# Patient Record
Sex: MALE | Race: WHITE | Employment: OTHER | URBAN - METROPOLITAN AREA
[De-identification: names, ages, dates, MRNs, and addresses within clinical notes are randomized per-mention and may not be internally consistent; named-entity substitution may affect disease eponyms.]

---

## 2018-10-16 ENCOUNTER — HOSPITAL ENCOUNTER (INPATIENT)
Age: 63
LOS: 4 days | Discharge: HOME | DRG: 378 | End: 2018-10-20
Attending: EMERGENCY MEDICINE | Admitting: INTERNAL MEDICINE
Payer: MEDICARE

## 2018-10-16 DIAGNOSIS — K92.1 GASTROINTESTINAL HEMORRHAGE WITH MELENA: Primary | ICD-10-CM

## 2018-10-16 DIAGNOSIS — D64.9 ANEMIA REQUIRING TRANSFUSIONS: ICD-10-CM

## 2018-10-16 PROBLEM — K92.2 ACUTE GI BLEEDING: Status: ACTIVE | Noted: 2018-10-16

## 2018-10-16 LAB
ABO/RH: NORMAL
ALBUMIN SERPL-MCNC: 3.6 G/DL (ref 3.5–5.2)
ALP BLD-CCNC: 70 U/L (ref 40–129)
ALT SERPL-CCNC: 12 U/L (ref 0–40)
ANION GAP SERPL CALCULATED.3IONS-SCNC: 13 MMOL/L (ref 7–16)
ANTIBODY SCREEN: NORMAL
AST SERPL-CCNC: 14 U/L (ref 0–39)
BASOPHILS ABSOLUTE: 0.06 E9/L (ref 0–0.2)
BASOPHILS RELATIVE PERCENT: 0.5 % (ref 0–2)
BILIRUB SERPL-MCNC: 0.3 MG/DL (ref 0–1.2)
BUN BLDV-MCNC: 19 MG/DL (ref 8–23)
CALCIUM SERPL-MCNC: 8.4 MG/DL (ref 8.6–10.2)
CHLORIDE BLD-SCNC: 103 MMOL/L (ref 98–107)
CO2: 23 MMOL/L (ref 22–29)
CREAT SERPL-MCNC: 0.8 MG/DL (ref 0.7–1.2)
EKG ATRIAL RATE: 91 BPM
EKG P AXIS: 32 DEGREES
EKG P-R INTERVAL: 158 MS
EKG Q-T INTERVAL: 350 MS
EKG QRS DURATION: 86 MS
EKG QTC CALCULATION (BAZETT): 430 MS
EKG R AXIS: -13 DEGREES
EKG T AXIS: 19 DEGREES
EKG VENTRICULAR RATE: 91 BPM
EOSINOPHILS ABSOLUTE: 0.41 E9/L (ref 0.05–0.5)
EOSINOPHILS RELATIVE PERCENT: 3.6 % (ref 0–6)
GFR AFRICAN AMERICAN: >60
GFR NON-AFRICAN AMERICAN: >60 ML/MIN/1.73
GLUCOSE BLD-MCNC: 169 MG/DL (ref 74–109)
HCT VFR BLD CALC: 22.6 % (ref 37–54)
HEMOGLOBIN: 7.2 G/DL (ref 12.5–16.5)
IMMATURE GRANULOCYTES #: 0.13 E9/L
IMMATURE GRANULOCYTES %: 1.2 % (ref 0–5)
INR BLD: 1.1
LACTIC ACID: 3.3 MMOL/L (ref 0.5–2.2)
LIPASE: 20 U/L (ref 13–60)
LYMPHOCYTES ABSOLUTE: 2.17 E9/L (ref 1.5–4)
LYMPHOCYTES RELATIVE PERCENT: 19.2 % (ref 20–42)
MCH RBC QN AUTO: 29.8 PG (ref 26–35)
MCHC RBC AUTO-ENTMCNC: 31.9 % (ref 32–34.5)
MCV RBC AUTO: 93.4 FL (ref 80–99.9)
METER GLUCOSE: 133 MG/DL (ref 70–110)
METER GLUCOSE: 192 MG/DL (ref 70–110)
MONOCYTES ABSOLUTE: 0.61 E9/L (ref 0.1–0.95)
MONOCYTES RELATIVE PERCENT: 5.4 % (ref 2–12)
NEUTROPHILS ABSOLUTE: 7.92 E9/L (ref 1.8–7.3)
NEUTROPHILS RELATIVE PERCENT: 70.1 % (ref 43–80)
PDW BLD-RTO: 14.5 FL (ref 11.5–15)
PLATELET # BLD: 164 E9/L (ref 130–450)
PMV BLD AUTO: 10.7 FL (ref 7–12)
POTASSIUM SERPL-SCNC: 4 MMOL/L (ref 3.5–5)
PROTHROMBIN TIME: 12.4 SEC (ref 9.3–12.4)
RBC # BLD: 2.42 E12/L (ref 3.8–5.8)
SODIUM BLD-SCNC: 139 MMOL/L (ref 132–146)
TOTAL PROTEIN: 5.8 G/DL (ref 6.4–8.3)
WBC # BLD: 11.3 E9/L (ref 4.5–11.5)

## 2018-10-16 PROCEDURE — 86923 COMPATIBILITY TEST ELECTRIC: CPT

## 2018-10-16 PROCEDURE — C9113 INJ PANTOPRAZOLE SODIUM, VIA: HCPCS | Performed by: EMERGENCY MEDICINE

## 2018-10-16 PROCEDURE — 6360000002 HC RX W HCPCS: Performed by: INTERNAL MEDICINE

## 2018-10-16 PROCEDURE — 93005 ELECTROCARDIOGRAM TRACING: CPT | Performed by: EMERGENCY MEDICINE

## 2018-10-16 PROCEDURE — 2580000003 HC RX 258: Performed by: EMERGENCY MEDICINE

## 2018-10-16 PROCEDURE — 86900 BLOOD TYPING SEROLOGIC ABO: CPT

## 2018-10-16 PROCEDURE — 2580000003 HC RX 258: Performed by: INTERNAL MEDICINE

## 2018-10-16 PROCEDURE — 2060000000 HC ICU INTERMEDIATE R&B

## 2018-10-16 PROCEDURE — 96374 THER/PROPH/DIAG INJ IV PUSH: CPT

## 2018-10-16 PROCEDURE — P9016 RBC LEUKOCYTES REDUCED: HCPCS

## 2018-10-16 PROCEDURE — 86901 BLOOD TYPING SEROLOGIC RH(D): CPT

## 2018-10-16 PROCEDURE — 6370000000 HC RX 637 (ALT 250 FOR IP): Performed by: INTERNAL MEDICINE

## 2018-10-16 PROCEDURE — 6360000002 HC RX W HCPCS: Performed by: EMERGENCY MEDICINE

## 2018-10-16 PROCEDURE — 85610 PROTHROMBIN TIME: CPT

## 2018-10-16 PROCEDURE — 86850 RBC ANTIBODY SCREEN: CPT

## 2018-10-16 PROCEDURE — 83690 ASSAY OF LIPASE: CPT

## 2018-10-16 PROCEDURE — 83605 ASSAY OF LACTIC ACID: CPT

## 2018-10-16 PROCEDURE — 80053 COMPREHEN METABOLIC PANEL: CPT

## 2018-10-16 PROCEDURE — 99285 EMERGENCY DEPT VISIT HI MDM: CPT

## 2018-10-16 PROCEDURE — 85025 COMPLETE CBC W/AUTO DIFF WBC: CPT

## 2018-10-16 PROCEDURE — 82962 GLUCOSE BLOOD TEST: CPT

## 2018-10-16 RX ORDER — ONDANSETRON 2 MG/ML
4 INJECTION INTRAMUSCULAR; INTRAVENOUS EVERY 6 HOURS PRN
Status: DISCONTINUED | OUTPATIENT
Start: 2018-10-16 | End: 2018-10-20 | Stop reason: HOSPADM

## 2018-10-16 RX ORDER — DEXTROSE MONOHYDRATE 25 G/50ML
12.5 INJECTION, SOLUTION INTRAVENOUS PRN
Status: DISCONTINUED | OUTPATIENT
Start: 2018-10-16 | End: 2018-10-20 | Stop reason: HOSPADM

## 2018-10-16 RX ORDER — CHOLECALCIFEROL (VITAMIN D3) 125 MCG
500 CAPSULE ORAL NIGHTLY
COMMUNITY
End: 2021-11-03

## 2018-10-16 RX ORDER — 0.9 % SODIUM CHLORIDE 0.9 %
250 INTRAVENOUS SOLUTION INTRAVENOUS ONCE
Status: COMPLETED | OUTPATIENT
Start: 2018-10-16 | End: 2018-10-18

## 2018-10-16 RX ORDER — CHLORAL HYDRATE 500 MG
1000 CAPSULE ORAL EVERY MORNING
COMMUNITY

## 2018-10-16 RX ORDER — SODIUM CHLORIDE 0.9 % (FLUSH) 0.9 %
10 SYRINGE (ML) INJECTION EVERY 12 HOURS SCHEDULED
Status: DISCONTINUED | OUTPATIENT
Start: 2018-10-16 | End: 2018-10-16 | Stop reason: SDUPTHER

## 2018-10-16 RX ORDER — DEXTROSE MONOHYDRATE 50 MG/ML
100 INJECTION, SOLUTION INTRAVENOUS PRN
Status: DISCONTINUED | OUTPATIENT
Start: 2018-10-16 | End: 2018-10-20 | Stop reason: HOSPADM

## 2018-10-16 RX ORDER — FAMOTIDINE 20 MG/1
20 TABLET, FILM COATED ORAL 2 TIMES DAILY
Status: DISCONTINUED | OUTPATIENT
Start: 2018-10-16 | End: 2018-10-17

## 2018-10-16 RX ORDER — NAPROXEN SODIUM 220 MG
220 TABLET ORAL EVERY MORNING
Status: ON HOLD | COMMUNITY
End: 2018-10-20 | Stop reason: HOSPADM

## 2018-10-16 RX ORDER — SODIUM CHLORIDE 0.9 % (FLUSH) 0.9 %
10 SYRINGE (ML) INJECTION PRN
Status: DISCONTINUED | OUTPATIENT
Start: 2018-10-16 | End: 2018-10-20 | Stop reason: HOSPADM

## 2018-10-16 RX ORDER — VITAMIN E 268 MG
400 CAPSULE ORAL EVERY MORNING
COMMUNITY
End: 2021-11-03

## 2018-10-16 RX ORDER — ACETAMINOPHEN 325 MG/1
650 TABLET ORAL EVERY 4 HOURS PRN
Status: DISCONTINUED | OUTPATIENT
Start: 2018-10-16 | End: 2018-10-20 | Stop reason: HOSPADM

## 2018-10-16 RX ORDER — SODIUM CHLORIDE 0.9 % (FLUSH) 0.9 %
10 SYRINGE (ML) INJECTION EVERY 12 HOURS SCHEDULED
Status: DISCONTINUED | OUTPATIENT
Start: 2018-10-16 | End: 2018-10-20 | Stop reason: HOSPADM

## 2018-10-16 RX ORDER — 0.9 % SODIUM CHLORIDE 0.9 %
1000 INTRAVENOUS SOLUTION INTRAVENOUS ONCE
Status: COMPLETED | OUTPATIENT
Start: 2018-10-16 | End: 2018-10-16

## 2018-10-16 RX ORDER — SODIUM CHLORIDE 0.9 % (FLUSH) 0.9 %
10 SYRINGE (ML) INJECTION PRN
Status: DISCONTINUED | OUTPATIENT
Start: 2018-10-16 | End: 2018-10-16 | Stop reason: SDUPTHER

## 2018-10-16 RX ORDER — PANTOPRAZOLE SODIUM 40 MG/10ML
40 INJECTION, POWDER, LYOPHILIZED, FOR SOLUTION INTRAVENOUS ONCE
Status: COMPLETED | OUTPATIENT
Start: 2018-10-16 | End: 2018-10-16

## 2018-10-16 RX ORDER — SODIUM CHLORIDE 9 MG/ML
INJECTION, SOLUTION INTRAVENOUS CONTINUOUS
Status: DISCONTINUED | OUTPATIENT
Start: 2018-10-16 | End: 2018-10-20

## 2018-10-16 RX ORDER — NICOTINE POLACRILEX 4 MG
15 LOZENGE BUCCAL PRN
Status: DISCONTINUED | OUTPATIENT
Start: 2018-10-16 | End: 2018-10-20 | Stop reason: HOSPADM

## 2018-10-16 RX ADMIN — ENOXAPARIN SODIUM 40 MG: 40 INJECTION, SOLUTION INTRAVENOUS; SUBCUTANEOUS at 18:23

## 2018-10-16 RX ADMIN — SODIUM CHLORIDE: 9 INJECTION, SOLUTION INTRAVENOUS at 18:23

## 2018-10-16 RX ADMIN — INSULIN LISPRO 1 UNITS: 100 INJECTION, SOLUTION INTRAVENOUS; SUBCUTANEOUS at 20:02

## 2018-10-16 RX ADMIN — PANTOPRAZOLE SODIUM 40 MG: 40 INJECTION, POWDER, FOR SOLUTION INTRAVENOUS at 14:34

## 2018-10-16 RX ADMIN — FAMOTIDINE 20 MG: 20 TABLET, FILM COATED ORAL at 20:01

## 2018-10-16 RX ADMIN — SODIUM CHLORIDE 1000 ML: 9 INJECTION, SOLUTION INTRAVENOUS at 14:32

## 2018-10-16 ASSESSMENT — ENCOUNTER SYMPTOMS
COUGH: 0
VOMITING: 0
DIARRHEA: 0
SINUS PRESSURE: 0
EYE PAIN: 0
BLOOD IN STOOL: 1
SHORTNESS OF BREATH: 0
BACK PAIN: 0
SORE THROAT: 0
HEMATEMESIS: 0
ABDOMINAL PAIN: 0
NAUSEA: 0
EYE DISCHARGE: 0
HEMATOCHEZIA: 1
WHEEZING: 0
EYE REDNESS: 0

## 2018-10-16 ASSESSMENT — PAIN SCALES - GENERAL
PAINLEVEL_OUTOF10: 0

## 2018-10-16 NOTE — ED PROVIDER NOTES
---------------------------------  Consultations:  Time: 7304. Spoke with Dr. Amelia Burgos. Discussed case. They will admit the patient. This patient's ED course included: a personal history and physicial examination, re-evaluation prior to disposition, multiple bedside re-evaluations, IV medications, cardiac monitoring and complex medical decision making and emergency management    This patient has remained hemodynamically stable during their ED course. Diagnosis:  1. Gastrointestinal hemorrhage with melena        Disposition:  Patient's disposition: Admit to telemetry  Patient's condition is stable.            Melissa Taylor DO  Resident  10/16/18 4052

## 2018-10-16 NOTE — ED NOTES
[facility] faxed and received per Lucina Yates will be nurse and Priscilla Chilo to advise her of need for unit of blood once ready     Tory Matt RN  10/16/18 7303

## 2018-10-17 ENCOUNTER — ANESTHESIA (OUTPATIENT)
Dept: ENDOSCOPY | Age: 63
DRG: 378 | End: 2018-10-17
Payer: MEDICARE

## 2018-10-17 ENCOUNTER — APPOINTMENT (OUTPATIENT)
Dept: CT IMAGING | Age: 63
DRG: 378 | End: 2018-10-17
Payer: MEDICARE

## 2018-10-17 ENCOUNTER — ANESTHESIA EVENT (OUTPATIENT)
Dept: ENDOSCOPY | Age: 63
DRG: 378 | End: 2018-10-17
Payer: MEDICARE

## 2018-10-17 VITALS
SYSTOLIC BLOOD PRESSURE: 131 MMHG | RESPIRATION RATE: 58 BRPM | DIASTOLIC BLOOD PRESSURE: 58 MMHG | OXYGEN SATURATION: 93 %

## 2018-10-17 LAB
ALBUMIN SERPL-MCNC: 2.9 G/DL (ref 3.5–5.2)
ALP BLD-CCNC: 63 U/L (ref 40–129)
ALT SERPL-CCNC: 9 U/L (ref 0–40)
ANION GAP SERPL CALCULATED.3IONS-SCNC: 10 MMOL/L (ref 7–16)
AST SERPL-CCNC: 11 U/L (ref 0–39)
BASOPHILS ABSOLUTE: 0.04 E9/L (ref 0–0.2)
BASOPHILS RELATIVE PERCENT: 0.3 % (ref 0–2)
BILIRUB SERPL-MCNC: 0.3 MG/DL (ref 0–1.2)
BLOOD BANK DISPENSE STATUS: NORMAL
BLOOD BANK PRODUCT CODE: NORMAL
BPU ID: NORMAL
BUN BLDV-MCNC: 17 MG/DL (ref 8–23)
CALCIUM SERPL-MCNC: 8 MG/DL (ref 8.6–10.2)
CHLORIDE BLD-SCNC: 103 MMOL/L (ref 98–107)
CO2: 26 MMOL/L (ref 22–29)
CREAT SERPL-MCNC: 0.7 MG/DL (ref 0.7–1.2)
DESCRIPTION BLOOD BANK: NORMAL
EOSINOPHILS ABSOLUTE: 0.55 E9/L (ref 0.05–0.5)
EOSINOPHILS RELATIVE PERCENT: 4.6 % (ref 0–6)
GFR AFRICAN AMERICAN: >60
GFR NON-AFRICAN AMERICAN: >60 ML/MIN/1.73
GLUCOSE BLD-MCNC: 160 MG/DL (ref 74–109)
HCT VFR BLD CALC: 18.9 % (ref 37–54)
HCT VFR BLD CALC: 19 % (ref 37–54)
HCT VFR BLD CALC: 21.4 % (ref 37–54)
HEMOGLOBIN: 6.2 G/DL (ref 12.5–16.5)
HEMOGLOBIN: 6.2 G/DL (ref 12.5–16.5)
HEMOGLOBIN: 7 G/DL (ref 12.5–16.5)
IMMATURE GRANULOCYTES #: 0.15 E9/L
IMMATURE GRANULOCYTES %: 1.3 % (ref 0–5)
LYMPHOCYTES ABSOLUTE: 2.87 E9/L (ref 1.5–4)
LYMPHOCYTES RELATIVE PERCENT: 24.2 % (ref 20–42)
MCH RBC QN AUTO: 30.4 PG (ref 26–35)
MCHC RBC AUTO-ENTMCNC: 32.6 % (ref 32–34.5)
MCV RBC AUTO: 93.1 FL (ref 80–99.9)
METER GLUCOSE: 146 MG/DL (ref 70–110)
METER GLUCOSE: 156 MG/DL (ref 70–110)
METER GLUCOSE: 171 MG/DL (ref 70–110)
METER GLUCOSE: 177 MG/DL (ref 70–110)
MONOCYTES ABSOLUTE: 0.66 E9/L (ref 0.1–0.95)
MONOCYTES RELATIVE PERCENT: 5.6 % (ref 2–12)
NEUTROPHILS ABSOLUTE: 7.61 E9/L (ref 1.8–7.3)
NEUTROPHILS RELATIVE PERCENT: 64 % (ref 43–80)
PDW BLD-RTO: 14.9 FL (ref 11.5–15)
PLATELET # BLD: 150 E9/L (ref 130–450)
PMV BLD AUTO: 10.8 FL (ref 7–12)
POTASSIUM REFLEX MAGNESIUM: 3.8 MMOL/L (ref 3.5–5)
RBC # BLD: 2.04 E12/L (ref 3.8–5.8)
SODIUM BLD-SCNC: 139 MMOL/L (ref 132–146)
TOTAL PROTEIN: 4.9 G/DL (ref 6.4–8.3)
WBC # BLD: 11.9 E9/L (ref 4.5–11.5)

## 2018-10-17 PROCEDURE — 6360000002 HC RX W HCPCS: Performed by: NURSE ANESTHETIST, CERTIFIED REGISTERED

## 2018-10-17 PROCEDURE — 6360000004 HC RX CONTRAST MEDICATION: Performed by: RADIOLOGY

## 2018-10-17 PROCEDURE — P9016 RBC LEUKOCYTES REDUCED: HCPCS

## 2018-10-17 PROCEDURE — 6370000000 HC RX 637 (ALT 250 FOR IP): Performed by: INTERNAL MEDICINE

## 2018-10-17 PROCEDURE — 85014 HEMATOCRIT: CPT

## 2018-10-17 PROCEDURE — 3700000001 HC ADD 15 MINUTES (ANESTHESIA): Performed by: INTERNAL MEDICINE

## 2018-10-17 PROCEDURE — 36415 COLL VENOUS BLD VENIPUNCTURE: CPT

## 2018-10-17 PROCEDURE — 7100000010 HC PHASE II RECOVERY - FIRST 15 MIN: Performed by: INTERNAL MEDICINE

## 2018-10-17 PROCEDURE — 2709999900 HC NON-CHARGEABLE SUPPLY: Performed by: INTERNAL MEDICINE

## 2018-10-17 PROCEDURE — C9113 INJ PANTOPRAZOLE SODIUM, VIA: HCPCS | Performed by: CLINICAL NURSE SPECIALIST

## 2018-10-17 PROCEDURE — 3700000000 HC ANESTHESIA ATTENDED CARE: Performed by: INTERNAL MEDICINE

## 2018-10-17 PROCEDURE — 85025 COMPLETE CBC W/AUTO DIFF WBC: CPT

## 2018-10-17 PROCEDURE — 87081 CULTURE SCREEN ONLY: CPT

## 2018-10-17 PROCEDURE — 3609012400 HC EGD TRANSORAL BIOPSY SINGLE/MULTIPLE: Performed by: INTERNAL MEDICINE

## 2018-10-17 PROCEDURE — 0DB68ZX EXCISION OF STOMACH, VIA NATURAL OR ARTIFICIAL OPENING ENDOSCOPIC, DIAGNOSTIC: ICD-10-PCS | Performed by: INTERNAL MEDICINE

## 2018-10-17 PROCEDURE — 2580000003 HC RX 258: Performed by: INTERNAL MEDICINE

## 2018-10-17 PROCEDURE — 2060000000 HC ICU INTERMEDIATE R&B

## 2018-10-17 PROCEDURE — 85018 HEMOGLOBIN: CPT

## 2018-10-17 PROCEDURE — 82962 GLUCOSE BLOOD TEST: CPT

## 2018-10-17 PROCEDURE — 86923 COMPATIBILITY TEST ELECTRIC: CPT

## 2018-10-17 PROCEDURE — 2580000003 HC RX 258: Performed by: NURSE ANESTHETIST, CERTIFIED REGISTERED

## 2018-10-17 PROCEDURE — 2580000003 HC RX 258: Performed by: EMERGENCY MEDICINE

## 2018-10-17 PROCEDURE — 6360000002 HC RX W HCPCS: Performed by: CLINICAL NURSE SPECIALIST

## 2018-10-17 PROCEDURE — 7100000011 HC PHASE II RECOVERY - ADDTL 15 MIN: Performed by: INTERNAL MEDICINE

## 2018-10-17 PROCEDURE — 36430 TRANSFUSION BLD/BLD COMPNT: CPT

## 2018-10-17 PROCEDURE — 80053 COMPREHEN METABOLIC PANEL: CPT

## 2018-10-17 PROCEDURE — 74178 CT ABD&PLV WO CNTR FLWD CNTR: CPT

## 2018-10-17 PROCEDURE — 2580000003 HC RX 258: Performed by: CLINICAL NURSE SPECIALIST

## 2018-10-17 RX ORDER — PROPOFOL 10 MG/ML
INJECTION, EMULSION INTRAVENOUS PRN
Status: DISCONTINUED | OUTPATIENT
Start: 2018-10-17 | End: 2018-10-17 | Stop reason: SDUPTHER

## 2018-10-17 RX ORDER — SODIUM CHLORIDE 9 MG/ML
INJECTION, SOLUTION INTRAVENOUS CONTINUOUS PRN
Status: DISCONTINUED | OUTPATIENT
Start: 2018-10-17 | End: 2018-10-17 | Stop reason: SDUPTHER

## 2018-10-17 RX ORDER — 0.9 % SODIUM CHLORIDE 0.9 %
250 INTRAVENOUS SOLUTION INTRAVENOUS ONCE
Status: DISCONTINUED | OUTPATIENT
Start: 2018-10-17 | End: 2018-10-20

## 2018-10-17 RX ORDER — GENTAMICIN SULFATE 80 MG/50ML
80 INJECTION, SOLUTION INTRAVENOUS ONCE
Status: COMPLETED | OUTPATIENT
Start: 2018-10-17 | End: 2018-10-17

## 2018-10-17 RX ADMIN — SODIUM CHLORIDE 8 MG/HR: 9 INJECTION, SOLUTION INTRAVENOUS at 13:15

## 2018-10-17 RX ADMIN — PROPOFOL 200 MG: 10 INJECTION, EMULSION INTRAVENOUS at 16:30

## 2018-10-17 RX ADMIN — SODIUM CHLORIDE 250 ML: 9 INJECTION, SOLUTION INTRAVENOUS at 09:00

## 2018-10-17 RX ADMIN — IOPAMIDOL 110 ML: 755 INJECTION, SOLUTION INTRAVENOUS at 20:55

## 2018-10-17 RX ADMIN — Medication 20 ML: at 12:47

## 2018-10-17 RX ADMIN — AMPICILLIN SODIUM 2 G: 2 INJECTION, POWDER, FOR SOLUTION INTRAMUSCULAR; INTRAVENOUS at 11:57

## 2018-10-17 RX ADMIN — SODIUM CHLORIDE: 9 INJECTION, SOLUTION INTRAVENOUS at 16:03

## 2018-10-17 RX ADMIN — Medication 10 ML: at 10:34

## 2018-10-17 RX ADMIN — IOHEXOL 50 ML: 240 INJECTION, SOLUTION INTRATHECAL; INTRAVASCULAR; INTRAVENOUS; ORAL at 20:55

## 2018-10-17 RX ADMIN — INSULIN LISPRO 1 UNITS: 100 INJECTION, SOLUTION INTRAVENOUS; SUBCUTANEOUS at 22:11

## 2018-10-17 RX ADMIN — Medication 10 ML: at 22:17

## 2018-10-17 RX ADMIN — GENTAMICIN SULFATE 80 MG: 80 INJECTION, SOLUTION INTRAVENOUS at 12:33

## 2018-10-17 RX ADMIN — OCTREOTIDE ACETATE 50 MCG/HR: 500 INJECTION, SOLUTION INTRAVENOUS; SUBCUTANEOUS at 11:16

## 2018-10-17 RX ADMIN — Medication 10 ML: at 09:00

## 2018-10-17 ASSESSMENT — PAIN SCALES - GENERAL
PAINLEVEL_OUTOF10: 0

## 2018-10-17 ASSESSMENT — PAIN DESCRIPTION - LOCATION
LOCATION: ABDOMEN

## 2018-10-17 ASSESSMENT — LIFESTYLE VARIABLES: SMOKING_STATUS: 1

## 2018-10-17 NOTE — ANESTHESIA PRE PROCEDURE
10/17/18 0900 250 mL at 10/17/18 0900    acetaminophen (TYLENOL) tablet 650 mg  650 mg Oral Q4H PRN Sky Ch MD        0.9 % sodium chloride infusion   Intravenous Continuous Sky Ch MD 75 mL/hr at 10/16/18 1823      sodium chloride flush 0.9 % injection 10 mL  10 mL Intravenous 2 times per day Sky Ch MD   10 mL at 10/17/18 0900    sodium chloride flush 0.9 % injection 10 mL  10 mL Intravenous PRN Sky Ch MD   20 mL at 10/17/18 1247    magnesium hydroxide (MILK OF MAGNESIA) 400 MG/5ML suspension 30 mL  30 mL Oral Daily PRN Sky Ch MD        ondansetron [facility]) injection 4 mg  4 mg Intravenous Q6H PRN Sky Ch MD        enoxaparin (LOVENOX) injection 40 mg  40 mg Subcutaneous Daily Sky Ch MD   Stopped at 10/17/18 0919    insulin lispro (HUMALOG) injection vial 0-6 Units  0-6 Units Subcutaneous TID WC Sky Ch MD        insulin lispro (HUMALOG) injection vial 0-3 Units  0-3 Units Subcutaneous Nightly Sky Ch MD   1 Units at 10/16/18 2002    glucose (GLUTOSE) 40 % oral gel 15 g  15 g Oral PRN Sky Ch MD        dextrose 50 % solution 12.5 g  12.5 g Intravenous PRN Sky Ch MD        glucagon (rDNA) injection 1 mg  1 mg Intramuscular PRN Sky Ch MD        dextrose 5 % solution  100 mL/hr Intravenous PRN Sky Ch MD           Allergies:  No Known Allergies    Problem List:    Patient Active Problem List   Diagnosis Code    Primary osteoarthritis of left hip M16.12    Primary osteoarthritis of left hip M16.12    Instability of right hip joint M25.351    Acute GI bleeding K92.2       Past Medical History:        Diagnosis Date    Arthritis     osteo    Diabetes mellitus ([address]     Full dentures     only wears uppers    Gout     h/o    History of blood transfusion     Sleep apnea     uses CPAP       Past Surgical History:        Procedure Laterality Date    COLONOSCOPY      HERNIA REPAIR      HIP CLOSED REDUCTION Right 7/14/2013    HIP CLOSED GLUCOSE 160 10/17/2018    PROT 4.9 10/17/2018    CALCIUM 8.0 10/17/2018    BILITOT 0.3 10/17/2018    ALKPHOS 63 10/17/2018    AST 11 10/17/2018    ALT 9 10/17/2018       POC Tests: No results for input(s): POCGLU, POCNA, POCK, POCCL, POCBUN, POCHEMO, POCHCT in the last 72 hours. Coags:   Lab Results   Component Value Date    PROTIME 12.4 10/16/2018    INR 1.1 10/16/2018       HCG (If Applicable): No results found for: PREGTESTUR, PREGSERUM, HCG, HCGQUANT     ABGs: No results found for: PHART, PO2ART, HBS7HWD, JHX2IZE, BEART, Z6AATPMT     Type & Screen (If Applicable):  No results found for: [facility]    Anesthesia Evaluation  Patient summary reviewed no history of anesthetic complications:   Airway: Mallampati: II  TM distance: >3 FB   Neck ROM: full   Dental:    (+) edentulous      Pulmonary: breath sounds clear to auscultation  (+) sleep apnea: on CPAP,  current smoker                           Cardiovascular:Negative CV ROS            Rhythm: regular  Rate: normal           Beta Blocker:  Not on Beta Blocker         Neuro/Psych:   Negative Neuro/Psych ROS              GI/Hepatic/Renal:   (+) morbid obesity         ROS comment: Acute GI bleeding. Endo/Other:    (+) DiabetesType II DM, , blood dyscrasia: anemia, arthritis: OA., .                  ROS comment: gout Abdominal:           Vascular: negative vascular ROS. Anesthesia Plan      MAC     ASA 3       Induction: intravenous. Anesthetic plan and risks discussed with patient. Plan discussed with CRNA.                   Fracna Alvarez MD   10/17/2018

## 2018-10-17 NOTE — H&P
History and Physical      CHIEF COMPLAINT:  melena      HISTORY OF PRESENT ILLNESS:      The patient is a 61 y.o. male patient of Dr Claribel Goodman who presents with melena from home. He was well until last Friday when he began to develop black tarry stools. He contacted his family doctor who said to monitor the condition over the weekend report back on Monday. Throughout the weekend he continued to have melena and called his family doctor's office on Monday. He called again on Tuesday morning and was referred to the emergency room. He has a history of arthritis and has had previous hip surgery. He has been taking naproxen for some time. He does state that many years ago he had some internal bleeding apparently related to naproxen. A colonoscopy was performed at that time and was unremarkable. He was recently seen by orthopedics and placed on a different nonsteroidal. He also has been experiencing epigastric abdominal discomfort recently. He denies any fever or chills chest pain shortness of breath. His symptoms were gradual in onset moderate severity. He does admit to associated weakness and fatigue. He was anemic and Hemoccult positive in the emergency room.     Past Medical History:    Past Medical History:   Diagnosis Date    Arthritis     osteo    Diabetes mellitus ([address]     Full dentures     only wears uppers    Gout     h/o    History of blood transfusion     Sleep apnea     uses CPAP       Past Surgical History:    Past Surgical History:   Procedure Laterality Date    COLONOSCOPY      HERNIA REPAIR      HIP CLOSED REDUCTION Right 7/14/2013    HIP CLOSED REDUCTION Right 6/5/2015    JOINT REPLACEMENT Right 16 yrs ago    tjah    KNEE ARTHROSCOPY Bilateral     OTHER SURGICAL HISTORY  12/11/2014    left total hip arthroplasty    REVISION TOTAL HIP ARTHROPLASTY Right 06/29/2015       Medications Prior to Admission:    Prescriptions Prior to Admission: aspirin 81 MG tablet, Take 81 mg by mouth nightly Neutrophils % 64.0 %    Immature Granulocytes % 1.3 %    Lymphocytes % 24.2 %    Monocytes % 5.6 %    Eosinophils % 4.6 %    Basophils % 0.3 %    Neutrophils # 7.61 (H) E9/L    Immature Granulocytes # 0.15 E9/L    Lymphocytes # 2.87 E9/L    Monocytes # 0.66 E9/L    Eosinophils # 0.55 (H) E9/L    Basophils # 0.04 E9/L   Transfuse RBC [[id number]] Resulted: 10/16/18 2134   Updated: 10/16/18 2134    POCT Glucose [[id number]] (Abnormal) Collected: 10/16/18 1958   Updated: 10/16/18 2009     Meter Glucose 192 (H) mg/dL   TYPE AND SCREEN [[id number]] Collected: 10/16/18 1426   Updated: 10/16/18 1810    Specimen Source: Blood     ABO/Rh O POS    Antibody Screen NEG   POCT Glucose [[id number]] (Abnormal) Collected: 10/16/18 1802   Updated: 10/16/18 1807     Meter Glucose 133 (H) mg/dL   Comprehensive Metabolic Panel [[id number]] (Abnormal) Collected: 10/16/18 1426   Updated: 10/16/18 1459    Specimen Type: Blood    Specimen Source: Blood     Sodium 139 mmol/L    Potassium 4.0 mmol/L    Chloride 103 mmol/L    CO2 23 mmol/L    Anion Gap 13 mmol/L    Glucose 169 (H) mg/dL    BUN 19 mg/dL    CREATININE 0.8 mg/dL    GFR Non-African American >60 mL/min/1.73    Comment: Chronic Kidney Disease: less than 60 ml/min/1.73 sq. m.         Kidney Failure: less than 15 ml/min/1.73 sq.m. Results valid for patients 18 years and older.         GFR African American >60    Calcium 8.4 (L) mg/dL    Total Protein 5.8 (L) g/dL    Alb 3.6 g/dL    Total Bilirubin 0.3 mg/dL    Alkaline Phosphatase 70 U/L    ALT 12 U/L    AST 14 U/L   Lipase [[id number]] Collected: 10/16/18 1426   Updated: 10/16/18 1459    Specimen Type: Blood    Specimen Source: Blood     Lipase 20 U/L   CBC Auto Differential [[id number]] (Abnormal) Collected: 10/16/18 1426   Updated: 10/16/18 1457    Specimen Source: Blood     WBC 11.3 E9/L    RBC 2.42 (L) E12/L    Hemoglobin 7.2 (L) g/dL    Hematocrit 22.6 (L) %    MCV 93.4 fL    MCH 29.8 pg    MCHC 31.9 (L) %    RDW 14.5 fL

## 2018-10-17 NOTE — CONSULTS
Consults     Gastroenterology Consult Note   Margret Cloud [facility] with Triny Zaidi M.D. Consult Note        Date of Service: 10/17/2018  Reason for Consult: GI bleed; known to you  Requesting Physician: Dr Karlie Perea:  Weakness, fatigue, maroon and black tarry stools    History Obtained From:  patient, electronic medical record    HISTORY OF PRESENT ILLNESS:       [place] Leal is a 61 y.o. male with significant past medical history of CHAITANYA; valvular heart disease - follows with Dr Reese Rust; blood transfusion; gout; DM; and arthritis admitted via ED for maroon and black tarry stools, weakness, and fatigue. Pt reports he noted black tarry stools since this past Friday, 10/12/2018, reportedly called his PCPs office. Patient reports to black tarry stools continued throughout the weekend so he called his PCP office on Monday morning, stating he was called back yesterday and told to proceed to the emergency department for evaluation. Patient reports he has noted fatigue and weakness over the past several weeks. He is reportedly undergoing physical therapy for his right hip in [facility] and states he has had to take breaks during his physical therapy due to fatigue. He noted dyspnea on exertion all day yesterday prior to coming to the hospital. Patient states he has had epigastric pain described as a \"twinge\" off and on over the past month. Patient reports he normally has 1 bowel movement per day of brown formed stool however over the past 4-5 days he has had black tarry diarrhea stating yesterday afternoon it was maroon-colored to dark red and this morning appeared bright red in color watery in consistency. Patient denies dizziness, nausea, vomiting, chills, fever, or hematemesis. Patient reports he has lost 80 pounds since February when he was told he had diabetes; he has been on a low carbohydrate diet.  Patient states his last colonoscopy was with Dr. Silvia Lynn about 4 years ago, last colonoscopy

## 2018-10-18 ENCOUNTER — ANESTHESIA EVENT (OUTPATIENT)
Dept: ENDOSCOPY | Age: 63
DRG: 378 | End: 2018-10-18
Payer: MEDICARE

## 2018-10-18 LAB
BLOOD BANK DISPENSE STATUS: NORMAL
BLOOD BANK DISPENSE STATUS: NORMAL
BLOOD BANK PRODUCT CODE: NORMAL
BLOOD BANK PRODUCT CODE: NORMAL
BPU ID: NORMAL
BPU ID: NORMAL
DESCRIPTION BLOOD BANK: NORMAL
DESCRIPTION BLOOD BANK: NORMAL
HBA1C MFR BLD: 5.2 % (ref 4–5.6)
HCT VFR BLD CALC: 23.4 % (ref 37–54)
HEMOGLOBIN: 7.7 G/DL (ref 12.5–16.5)
METER GLUCOSE: 107 MG/DL (ref 70–110)
METER GLUCOSE: 150 MG/DL (ref 70–110)
METER GLUCOSE: 154 MG/DL (ref 70–110)
METER GLUCOSE: 174 MG/DL (ref 70–110)

## 2018-10-18 PROCEDURE — G8988 SELF CARE GOAL STATUS: HCPCS

## 2018-10-18 PROCEDURE — 83036 HEMOGLOBIN GLYCOSYLATED A1C: CPT

## 2018-10-18 PROCEDURE — 6370000000 HC RX 637 (ALT 250 FOR IP): Performed by: INTERNAL MEDICINE

## 2018-10-18 PROCEDURE — 6360000002 HC RX W HCPCS: Performed by: CLINICAL NURSE SPECIALIST

## 2018-10-18 PROCEDURE — 82962 GLUCOSE BLOOD TEST: CPT

## 2018-10-18 PROCEDURE — 2060000000 HC ICU INTERMEDIATE R&B

## 2018-10-18 PROCEDURE — 85014 HEMATOCRIT: CPT

## 2018-10-18 PROCEDURE — 97165 OT EVAL LOW COMPLEX 30 MIN: CPT

## 2018-10-18 PROCEDURE — G8989 SELF CARE D/C STATUS: HCPCS

## 2018-10-18 PROCEDURE — P9016 RBC LEUKOCYTES REDUCED: HCPCS

## 2018-10-18 PROCEDURE — 6370000000 HC RX 637 (ALT 250 FOR IP): Performed by: NURSE PRACTITIONER

## 2018-10-18 PROCEDURE — 2580000003 HC RX 258: Performed by: CLINICAL NURSE SPECIALIST

## 2018-10-18 PROCEDURE — C9113 INJ PANTOPRAZOLE SODIUM, VIA: HCPCS | Performed by: CLINICAL NURSE SPECIALIST

## 2018-10-18 PROCEDURE — 6370000000 HC RX 637 (ALT 250 FOR IP): Performed by: CLINICAL NURSE SPECIALIST

## 2018-10-18 PROCEDURE — 2580000003 HC RX 258: Performed by: INTERNAL MEDICINE

## 2018-10-18 PROCEDURE — G8987 SELF CARE CURRENT STATUS: HCPCS

## 2018-10-18 PROCEDURE — 36415 COLL VENOUS BLD VENIPUNCTURE: CPT

## 2018-10-18 PROCEDURE — 97161 PT EVAL LOW COMPLEX 20 MIN: CPT

## 2018-10-18 PROCEDURE — 85018 HEMOGLOBIN: CPT

## 2018-10-18 RX ORDER — PANTOPRAZOLE SODIUM 40 MG/1
40 TABLET, DELAYED RELEASE ORAL
Status: DISCONTINUED | OUTPATIENT
Start: 2018-10-18 | End: 2018-10-20 | Stop reason: HOSPADM

## 2018-10-18 RX ADMIN — OCTREOTIDE ACETATE 50 MCG/HR: 500 INJECTION, SOLUTION INTRAVENOUS; SUBCUTANEOUS at 02:42

## 2018-10-18 RX ADMIN — INSULIN LISPRO 1 UNITS: 100 INJECTION, SOLUTION INTRAVENOUS; SUBCUTANEOUS at 07:57

## 2018-10-18 RX ADMIN — Medication 10 ML: at 21:44

## 2018-10-18 RX ADMIN — OCTREOTIDE ACETATE 50 MCG/HR: 500 INJECTION, SOLUTION INTRAVENOUS; SUBCUTANEOUS at 00:24

## 2018-10-18 RX ADMIN — Medication 10 ML: at 09:25

## 2018-10-18 RX ADMIN — INSULIN LISPRO 1 UNITS: 100 INJECTION, SOLUTION INTRAVENOUS; SUBCUTANEOUS at 11:27

## 2018-10-18 RX ADMIN — OCTREOTIDE ACETATE 50 MCG/HR: 500 INJECTION, SOLUTION INTRAVENOUS; SUBCUTANEOUS at 14:13

## 2018-10-18 RX ADMIN — INSULIN LISPRO 1 UNITS: 100 INJECTION, SOLUTION INTRAVENOUS; SUBCUTANEOUS at 18:01

## 2018-10-18 RX ADMIN — SODIUM CHLORIDE: 9 INJECTION, SOLUTION INTRAVENOUS at 05:06

## 2018-10-18 RX ADMIN — MAGESIUM CITRATE 296 ML: 1.75 LIQUID ORAL at 12:51

## 2018-10-18 RX ADMIN — MAGESIUM CITRATE 300 ML: 1.75 LIQUID ORAL at 15:40

## 2018-10-18 RX ADMIN — BISACODYL 30 MG: 5 TABLET, COATED ORAL at 17:10

## 2018-10-18 RX ADMIN — PANTOPRAZOLE SODIUM 40 MG: 40 TABLET, DELAYED RELEASE ORAL at 16:14

## 2018-10-18 RX ADMIN — SODIUM CHLORIDE 8 MG/HR: 9 INJECTION, SOLUTION INTRAVENOUS at 10:24

## 2018-10-18 RX ADMIN — SODIUM CHLORIDE: 9 INJECTION, SOLUTION INTRAVENOUS at 18:03

## 2018-10-18 ASSESSMENT — PAIN SCALES - GENERAL: PAINLEVEL_OUTOF10: 0

## 2018-10-18 ASSESSMENT — LIFESTYLE VARIABLES: SMOKING_STATUS: 1

## 2018-10-18 NOTE — ANESTHESIA PRE PROCEDURE
 Acute GI bleeding K92.2       Past Medical History:        Diagnosis Date    Arthritis     osteo    Diabetes mellitus ([address]     Full dentures     only wears uppers    Gout     h/o    History of blood transfusion     Sleep apnea     uses CPAP       Past Surgical History:        Procedure Laterality Date    COLONOSCOPY      HERNIA REPAIR      HIP CLOSED REDUCTION Right 7/14/2013    HIP CLOSED REDUCTION Right 6/5/2015    JOINT REPLACEMENT Right 16 yrs ago    tjah    KNEE ARTHROSCOPY Bilateral     OTHER SURGICAL HISTORY  12/11/2014    left total hip arthroplasty    REVISION TOTAL HIP ARTHROPLASTY Right 06/29/2015    UPPER GASTROINTESTINAL ENDOSCOPY N/A 10/17/2018    EGD BIOPSY performed by Deborah Mesa MD at [facility] ENDOSCOPY       Social History:    Social History   Substance Use Topics    Smoking status: Light Tobacco Smoker     Years: 30.00    Smokeless tobacco: Former User      Comment: has not smoked in a week, occassionally    Alcohol use Yes      Comment: \"a lot\"', none since 06/20/2015. Ready to quit: Not Answered  Counseling given: Not Answered      Vital Signs (Current): There were no vitals filed for this visit.                                            BP Readings from Last 3 Encounters:   10/18/18 102/63   10/17/18 (!) 131/58   07/06/15 106/48       NPO Status:  greater than 8 hours                                                                               BMI:   Wt Readings from Last 3 Encounters:   10/18/18 (!) 355 lb 14.4 oz (161.4 kg)   07/06/15 (!) 412 lb 3.2 oz (187 kg)   06/25/15 (!) 380 lb (172.4 kg)     There is no height or weight on file to calculate BMI.    CBC:   Lab Results   Component Value Date    WBC 11.9 10/17/2018    RBC 2.04 10/17/2018    HGB 7.7 10/18/2018    HCT 23.4 10/18/2018    MCV 93.1 10/17/2018    RDW 14.9 10/17/2018     10/17/2018       CMP:   Lab Results   Component Value Date     10/17/2018    K 3.8 ADDENDUM:    Patient seen and chart reviewed. No interval change in history or exam.   Anesthesia plan discussed, risk/benefits addressed. Patient's concerns and questions answered.      [address],   October 19, 2018  2:02 PM

## 2018-10-18 NOTE — PROGRESS NOTES
[facility] Quality Flow/Interdisciplinary Rounds Progress Note        Quality Flow Rounds held on October 18, 2018    Disciplines Attending:  Bedside Nurse, ,  and Nursing Unit Leadership    Jo-Ann Wynne was admitted on 10/16/2018  1:49 PM    Anticipated Discharge Date:       Disposition:    Augustin Score:  Augustin Scale Score: 20    Readmission Risk              Risk of Unplanned Readmission:        9             Discussed patient goal for the day, patient clinical progression, and barriers to discharge. The following Goal(s) of the Day/Commitment(s) have been identified:  Wean off Protonix gtt and Sandostatin gtt and fluids.       Elda Long  October 18, 2018

## 2018-10-18 NOTE — PROGRESS NOTES
40 mg Daily   insulin lispro (HUMALOG) injection vial 0-6 Units TID WC   insulin lispro (HUMALOG) injection vial 0-3 Units Nightly   glucose (GLUTOSE) 40 % oral gel 15 g PRN   dextrose 50 % solution 12.5 g PRN   glucagon (rDNA) injection 1 mg PRN   dextrose 5 % solution PRN             Data Review        CBC: Lab Results   Component Value Date     WBC 11.9 10/17/2018     RBC 2.04 10/17/2018     HGB 7.0 10/17/2018     HCT 21.4 10/17/2018     MCV 93.1 10/17/2018     MCH 30.4 10/17/2018     MCHC 32.6 10/17/2018     RDW 14.9 10/17/2018      10/17/2018     MPV 10.8 10/17/2018            CMP:  Lab Results   Component Value Date      10/17/2018     K 3.8 10/17/2018      10/17/2018     CO2 26 10/17/2018     BUN 17 10/17/2018     CREATININE 0.7 10/17/2018     GFRAA >60 10/17/2018     LABGLOM >60 10/17/2018     GLUCOSE 160 10/17/2018     PROT 4.9 10/17/2018     LABALBU 2.9 10/17/2018     CALCIUM 8.0 10/17/2018     BILITOT 0.3 10/17/2018     ALKPHOS 63 10/17/2018     AST 11 10/17/2018     ALT 9 10/17/2018            Hepatic Function Panel:  Lab Results   Component Value Date     ALKPHOS 63 10/17/2018     ALT 9 10/17/2018     AST 11 10/17/2018     PROT 4.9 10/17/2018     BILITOT 0.3 10/17/2018     LABALBU 2.9 10/17/2018        PT/INR:          Lab Results   Component Value Date     PROTIME 12.4 10/16/2018     INR 1.1 10/16/2018         Assessment:      Principal Problem:    Acute GI bleeding  Active Problems:  ? Anemia, normocytic, likely 2/2 to GI blood loss, upper endoscopy negative - melenotic to maroon, now brighter red heme + stools  ? Weakness and fatigue  ? BERNAL  ? Epigastric pain  ? Diarrhea with melena and hematochezia  ? Leukocytosis  ? ETOH abuse, cut down 5 months ago  ? Valvular heart disease, harsh murmur - follows with Dr Ye Maldonado routinely  ? EGD 10/17/18 - GERD, Gastritis, no active or remote bleeding seen; KASH pending        Plan:      ? H&H now,  transfuse per PCP  ? Hold Lovenox  ?  Clear

## 2018-10-18 NOTE — OP NOTE
withdrawing the scope and conducting a second look  on the way out, which was essentially the same. The patient tolerated the procedure well.         Edmund Paulino MD    D: 10/17/2018 17:04:00       T: 10/18/2018 2:45:10     [id number]  Job#: [id number]     Doc#: [id number]    CC:  MD Lucia Scott MD

## 2018-10-19 ENCOUNTER — ANESTHESIA (OUTPATIENT)
Dept: ENDOSCOPY | Age: 63
DRG: 378 | End: 2018-10-19
Payer: MEDICARE

## 2018-10-19 VITALS — DIASTOLIC BLOOD PRESSURE: 44 MMHG | OXYGEN SATURATION: 94 % | SYSTOLIC BLOOD PRESSURE: 93 MMHG

## 2018-10-19 LAB
CLOTEST: NORMAL
HCT VFR BLD CALC: 24.4 % (ref 37–54)
HEMOGLOBIN: 7.8 G/DL (ref 12.5–16.5)
METER GLUCOSE: 116 MG/DL (ref 70–110)
METER GLUCOSE: 155 MG/DL (ref 70–110)
METER GLUCOSE: 160 MG/DL (ref 70–110)

## 2018-10-19 PROCEDURE — 6370000000 HC RX 637 (ALT 250 FOR IP): Performed by: INTERNAL MEDICINE

## 2018-10-19 PROCEDURE — 2580000003 HC RX 258: Performed by: CLINICAL NURSE SPECIALIST

## 2018-10-19 PROCEDURE — 2580000003 HC RX 258: Performed by: INTERNAL MEDICINE

## 2018-10-19 PROCEDURE — 1200000000 HC SEMI PRIVATE

## 2018-10-19 PROCEDURE — 7100000010 HC PHASE II RECOVERY - FIRST 15 MIN: Performed by: INTERNAL MEDICINE

## 2018-10-19 PROCEDURE — 7100000011 HC PHASE II RECOVERY - ADDTL 15 MIN: Performed by: INTERNAL MEDICINE

## 2018-10-19 PROCEDURE — 2709999900 HC NON-CHARGEABLE SUPPLY: Performed by: INTERNAL MEDICINE

## 2018-10-19 PROCEDURE — 6360000002 HC RX W HCPCS: Performed by: CLINICAL NURSE SPECIALIST

## 2018-10-19 PROCEDURE — 82962 GLUCOSE BLOOD TEST: CPT

## 2018-10-19 PROCEDURE — 2580000003 HC RX 258: Performed by: NURSE ANESTHETIST, CERTIFIED REGISTERED

## 2018-10-19 PROCEDURE — 6360000002 HC RX W HCPCS: Performed by: NURSE ANESTHETIST, CERTIFIED REGISTERED

## 2018-10-19 PROCEDURE — 3609009500 HC COLONOSCOPY DIAGNOSTIC OR SCREENING: Performed by: INTERNAL MEDICINE

## 2018-10-19 PROCEDURE — 3700000000 HC ANESTHESIA ATTENDED CARE: Performed by: INTERNAL MEDICINE

## 2018-10-19 PROCEDURE — 85014 HEMATOCRIT: CPT

## 2018-10-19 PROCEDURE — 36415 COLL VENOUS BLD VENIPUNCTURE: CPT

## 2018-10-19 PROCEDURE — 3700000001 HC ADD 15 MINUTES (ANESTHESIA): Performed by: INTERNAL MEDICINE

## 2018-10-19 PROCEDURE — 6370000000 HC RX 637 (ALT 250 FOR IP): Performed by: NURSE PRACTITIONER

## 2018-10-19 PROCEDURE — 6370000000 HC RX 637 (ALT 250 FOR IP): Performed by: CLINICAL NURSE SPECIALIST

## 2018-10-19 PROCEDURE — 85018 HEMOGLOBIN: CPT

## 2018-10-19 PROCEDURE — 0DJD8ZZ INSPECTION OF LOWER INTESTINAL TRACT, VIA NATURAL OR ARTIFICIAL OPENING ENDOSCOPIC: ICD-10-PCS | Performed by: INTERNAL MEDICINE

## 2018-10-19 RX ORDER — FENTANYL CITRATE 50 UG/ML
INJECTION, SOLUTION INTRAMUSCULAR; INTRAVENOUS PRN
Status: DISCONTINUED | OUTPATIENT
Start: 2018-10-19 | End: 2018-10-19 | Stop reason: SDUPTHER

## 2018-10-19 RX ORDER — SODIUM CHLORIDE 9 MG/ML
INJECTION, SOLUTION INTRAVENOUS CONTINUOUS PRN
Status: DISCONTINUED | OUTPATIENT
Start: 2018-10-19 | End: 2018-10-19 | Stop reason: SDUPTHER

## 2018-10-19 RX ORDER — PROPOFOL 10 MG/ML
INJECTION, EMULSION INTRAVENOUS CONTINUOUS PRN
Status: DISCONTINUED | OUTPATIENT
Start: 2018-10-19 | End: 2018-10-19 | Stop reason: SDUPTHER

## 2018-10-19 RX ADMIN — FENTANYL CITRATE 50 MCG: 50 INJECTION, SOLUTION INTRAMUSCULAR; INTRAVENOUS at 14:45

## 2018-10-19 RX ADMIN — Medication 10 ML: at 10:15

## 2018-10-19 RX ADMIN — PANTOPRAZOLE SODIUM 40 MG: 40 TABLET, DELAYED RELEASE ORAL at 17:07

## 2018-10-19 RX ADMIN — MAGESIUM CITRATE 296 ML: 1.75 LIQUID ORAL at 05:04

## 2018-10-19 RX ADMIN — SODIUM CHLORIDE: 9 INJECTION, SOLUTION INTRAVENOUS at 14:40

## 2018-10-19 RX ADMIN — SODIUM CHLORIDE: 9 INJECTION, SOLUTION INTRAVENOUS at 07:00

## 2018-10-19 RX ADMIN — INSULIN LISPRO 1 UNITS: 100 INJECTION, SOLUTION INTRAVENOUS; SUBCUTANEOUS at 17:10

## 2018-10-19 RX ADMIN — PROPOFOL 100 MCG/KG/MIN: 10 INJECTION, EMULSION INTRAVENOUS at 14:49

## 2018-10-19 RX ADMIN — Medication 10 ML: at 22:22

## 2018-10-19 RX ADMIN — OCTREOTIDE ACETATE 50 MCG/HR: 500 INJECTION, SOLUTION INTRAVENOUS; SUBCUTANEOUS at 01:22

## 2018-10-19 RX ADMIN — PANTOPRAZOLE SODIUM 40 MG: 40 TABLET, DELAYED RELEASE ORAL at 06:57

## 2018-10-19 RX ADMIN — FENTANYL CITRATE 50 MCG: 50 INJECTION, SOLUTION INTRAMUSCULAR; INTRAVENOUS at 14:49

## 2018-10-19 ASSESSMENT — PAIN SCALES - GENERAL: PAINLEVEL_OUTOF10: 0

## 2018-10-20 VITALS
HEIGHT: 72 IN | TEMPERATURE: 98.4 F | HEART RATE: 60 BPM | WEIGHT: 315 LBS | BODY MASS INDEX: 42.66 KG/M2 | OXYGEN SATURATION: 96 % | DIASTOLIC BLOOD PRESSURE: 78 MMHG | SYSTOLIC BLOOD PRESSURE: 138 MMHG | RESPIRATION RATE: 16 BRPM

## 2018-10-20 PROBLEM — D62 ACUTE BLOOD LOSS ANEMIA: Status: ACTIVE | Noted: 2018-10-20

## 2018-10-20 PROBLEM — K92.2 ACUTE GI BLEEDING: Status: RESOLVED | Noted: 2018-10-16 | Resolved: 2018-10-20

## 2018-10-20 PROBLEM — E11.9 DIABETES MELLITUS TYPE 2, CONTROLLED (HCC): Chronic | Status: ACTIVE | Noted: 2018-10-20

## 2018-10-20 PROBLEM — K57.90 DIVERTICULOSIS: Chronic | Status: ACTIVE | Noted: 2018-10-20

## 2018-10-20 LAB
ABO/RH: NORMAL
ANTIBODY SCREEN: NORMAL
HCT VFR BLD CALC: 24.6 % (ref 37–54)
HEMOGLOBIN: 7.6 G/DL (ref 12.5–16.5)
METER GLUCOSE: 136 MG/DL (ref 70–110)
METER GLUCOSE: 177 MG/DL (ref 70–110)

## 2018-10-20 PROCEDURE — 85018 HEMOGLOBIN: CPT

## 2018-10-20 PROCEDURE — 2580000003 HC RX 258: Performed by: INTERNAL MEDICINE

## 2018-10-20 PROCEDURE — 36415 COLL VENOUS BLD VENIPUNCTURE: CPT

## 2018-10-20 PROCEDURE — 6360000002 HC RX W HCPCS: Performed by: CLINICAL NURSE SPECIALIST

## 2018-10-20 PROCEDURE — 86850 RBC ANTIBODY SCREEN: CPT

## 2018-10-20 PROCEDURE — 86901 BLOOD TYPING SEROLOGIC RH(D): CPT

## 2018-10-20 PROCEDURE — 86900 BLOOD TYPING SEROLOGIC ABO: CPT

## 2018-10-20 PROCEDURE — 6370000000 HC RX 637 (ALT 250 FOR IP): Performed by: NURSE PRACTITIONER

## 2018-10-20 PROCEDURE — 2580000003 HC RX 258: Performed by: CLINICAL NURSE SPECIALIST

## 2018-10-20 PROCEDURE — 82962 GLUCOSE BLOOD TEST: CPT

## 2018-10-20 PROCEDURE — 85014 HEMATOCRIT: CPT

## 2018-10-20 PROCEDURE — 6370000000 HC RX 637 (ALT 250 FOR IP): Performed by: INTERNAL MEDICINE

## 2018-10-20 RX ORDER — LANOLIN ALCOHOL/MO/W.PET/CERES
325 CREAM (GRAM) TOPICAL 2 TIMES DAILY
Qty: 90 TABLET | Refills: 3 | Status: SHIPPED | OUTPATIENT
Start: 2018-10-20

## 2018-10-20 RX ORDER — PANTOPRAZOLE SODIUM 40 MG/1
40 TABLET, DELAYED RELEASE ORAL
Qty: 60 TABLET | Refills: 0 | Status: SHIPPED | OUTPATIENT
Start: 2018-10-20 | End: 2021-11-03

## 2018-10-20 RX ADMIN — OCTREOTIDE ACETATE 50 MCG/HR: 500 INJECTION, SOLUTION INTRAVENOUS; SUBCUTANEOUS at 07:34

## 2018-10-20 RX ADMIN — SODIUM CHLORIDE: 9 INJECTION, SOLUTION INTRAVENOUS at 08:46

## 2018-10-20 RX ADMIN — Medication 10 ML: at 08:45

## 2018-10-20 RX ADMIN — INSULIN LISPRO 1 UNITS: 100 INJECTION, SOLUTION INTRAVENOUS; SUBCUTANEOUS at 11:13

## 2018-10-20 RX ADMIN — PANTOPRAZOLE SODIUM 40 MG: 40 TABLET, DELAYED RELEASE ORAL at 06:28

## 2018-10-20 ASSESSMENT — PAIN SCALES - GENERAL
PAINLEVEL_OUTOF10: 0

## 2018-10-20 NOTE — OP NOTE
cecum, ascending  colon, transverse, and descending colon, conducting a second look on  the way out which was essentially unremarkable. The scope was then  retroflexed in the rectum, and examination of the rectal vault  appeared to be unremarkable as well. The scope was then straightened, the area deflated, and the procedure  was terminated. The patient tolerated the procedure well.         Nazia Mendoza MD    D: 10/19/2018 15:38:41       T: 10/20/2018 2:24:36     [id number]  Job#: [id number]     Doc#: [id number]    CC:  MD Triny Weir MD

## 2018-10-20 NOTE — PROGRESS NOTES
10/19/18 2041   NIV Type   Mode Other (Comment)  (hpap at bedside)   Dr. Cassy Caal pt. Requests use of their Home BIPAP during their stay. This requires a home         bipap order.  Thanks,

## 2018-11-19 ENCOUNTER — HOSPITAL ENCOUNTER (OUTPATIENT)
Dept: CT IMAGING | Age: 63
Discharge: HOME | End: 2018-11-21
Payer: MEDICARE

## 2018-11-19 ENCOUNTER — HOSPITAL ENCOUNTER (OUTPATIENT)
Age: 63
Discharge: HOME | End: 2018-11-21
Payer: MEDICARE

## 2018-11-19 DIAGNOSIS — R51.9 NONINTRACTABLE HEADACHE, UNSPECIFIED CHRONICITY PATTERN, UNSPECIFIED HEADACHE TYPE: ICD-10-CM

## 2018-11-19 DIAGNOSIS — R42 DIZZINESS AND GIDDINESS: ICD-10-CM

## 2018-11-19 PROCEDURE — 70450 CT HEAD/BRAIN W/O DYE: CPT

## 2020-05-07 ENCOUNTER — APPOINTMENT (OUTPATIENT)
Dept: GENERAL RADIOLOGY | Age: 65
DRG: 208 | End: 2020-05-07
Payer: MEDICARE

## 2020-05-07 ENCOUNTER — HOSPITAL ENCOUNTER (INPATIENT)
Age: 65
LOS: 4 days | Discharge: HOME | DRG: 208 | End: 2020-05-11
Attending: EMERGENCY MEDICINE | Admitting: INTERNAL MEDICINE
Payer: MEDICARE

## 2020-05-07 ENCOUNTER — APPOINTMENT (OUTPATIENT)
Dept: CT IMAGING | Age: 65
DRG: 208 | End: 2020-05-07
Payer: MEDICARE

## 2020-05-07 PROBLEM — J96.01 ACUTE RESPIRATORY FAILURE WITH HYPOXIA (HCC): Status: ACTIVE | Noted: 2020-05-07

## 2020-05-07 PROBLEM — E66.01 MORBID OBESITY WITH BMI OF 50.0-59.9, ADULT (HCC): Status: ACTIVE | Noted: 2020-05-07

## 2020-05-07 PROBLEM — J12.9 VIRAL PNEUMONIA: Status: ACTIVE | Noted: 2020-05-07

## 2020-05-07 LAB
ADENOVIRUS BY PCR: NOT DETECTED
AMORPHOUS: ABNORMAL
ANION GAP SERPL CALCULATED.3IONS-SCNC: 10 MMOL/L (ref 7–16)
APTT: 28.4 SEC (ref 24.5–35.1)
B.E.: -4.3 MMOL/L (ref -3–0)
BACTERIA: ABNORMAL /HPF
BASOPHILS ABSOLUTE: 0.05 E9/L (ref 0–0.2)
BASOPHILS RELATIVE PERCENT: 0.6 % (ref 0–2)
BILIRUBIN URINE: NEGATIVE
BLOOD, URINE: ABNORMAL
BORDETELLA PARAPERTUSSIS BY PCR: NOT DETECTED
BORDETELLA PERTUSSIS BY PCR: NOT DETECTED
BUN BLDV-MCNC: 17 MG/DL (ref 8–23)
CALCIUM SERPL-MCNC: 8.7 MG/DL (ref 8.6–10.2)
CHLAMYDOPHILIA PNEUMONIAE BY PCR: NOT DETECTED
CHLORIDE BLD-SCNC: 99 MMOL/L (ref 98–107)
CLARITY: CLEAR
CO2: 28 MMOL/L (ref 22–29)
COLOR: YELLOW
CORONAVIRUS 229E BY PCR: NOT DETECTED
CORONAVIRUS HKU1 BY PCR: NOT DETECTED
CORONAVIRUS NL63 BY PCR: NOT DETECTED
CORONAVIRUS OC43 BY PCR: NOT DETECTED
CREAT SERPL-MCNC: 1 MG/DL (ref 0.7–1.2)
CRITICAL NOTIFICATION: YES
D DIMER: 341 NG/ML DDU
DELIVERY SYSTEMS: ABNORMAL
DEVICE: ABNORMAL
EOSINOPHILS ABSOLUTE: 0.36 E9/L (ref 0.05–0.5)
EOSINOPHILS RELATIVE PERCENT: 4.1 % (ref 0–6)
EPITHELIAL CELLS, UA: ABNORMAL /HPF
FIBRINOGEN: 508 MG/DL (ref 225–540)
FIO2 ARTERIAL: 100
GFR AFRICAN AMERICAN: >60
GFR NON-AFRICAN AMERICAN: >60 ML/MIN/1.73
GLUCOSE BLD-MCNC: 125 MG/DL (ref 74–99)
GLUCOSE URINE: NEGATIVE MG/DL
HCO3 ARTERIAL: 30.5 MMOL/L (ref 22–26)
HCT VFR BLD CALC: 46.8 % (ref 37–54)
HEMOGLOBIN: 15.5 G/DL (ref 12.5–16.5)
HUMAN METAPNEUMOVIRUS BY PCR: NOT DETECTED
HUMAN RHINOVIRUS/ENTEROVIRUS BY PCR: NOT DETECTED
IMMATURE GRANULOCYTES #: 0.06 E9/L
IMMATURE GRANULOCYTES %: 0.7 % (ref 0–5)
INFLUENZA A BY PCR: NOT DETECTED
INFLUENZA A BY PCR: NOT DETECTED
INFLUENZA B BY PCR: NOT DETECTED
INFLUENZA B BY PCR: NOT DETECTED
INR BLD: 0.9
KETONES, URINE: NEGATIVE MG/DL
LACTIC ACID, SEPSIS: 1.5 MMOL/L (ref 0.5–1.9)
LEUKOCYTE ESTERASE, URINE: NEGATIVE
LYMPHOCYTES ABSOLUTE: 1.6 E9/L (ref 1.5–4)
LYMPHOCYTES RELATIVE PERCENT: 18.1 % (ref 20–42)
MCH RBC QN AUTO: 32.9 PG (ref 26–35)
MCHC RBC AUTO-ENTMCNC: 33.1 % (ref 32–34.5)
MCV RBC AUTO: 99.4 FL (ref 80–99.9)
MODE: AC
MONOCYTES ABSOLUTE: 0.6 E9/L (ref 0.1–0.95)
MONOCYTES RELATIVE PERCENT: 6.8 % (ref 2–12)
MYCOPLASMA PNEUMONIAE BY PCR: NOT DETECTED
NEUTROPHILS ABSOLUTE: 6.18 E9/L (ref 1.8–7.3)
NEUTROPHILS RELATIVE PERCENT: 69.7 % (ref 43–80)
NITRITE, URINE: NEGATIVE
O2 SATURATION: 92 % (ref 92–98.5)
OPERATOR ID: 420
PARAINFLUENZA VIRUS 1 BY PCR: NOT DETECTED
PARAINFLUENZA VIRUS 2 BY PCR: NOT DETECTED
PARAINFLUENZA VIRUS 3 BY PCR: NOT DETECTED
PARAINFLUENZA VIRUS 4 BY PCR: NOT DETECTED
PATIENT TEMP: 37
PCO2 (TEMP CORRECTED): 107.2 MMHG (ref 35–45)
PDW BLD-RTO: 13.8 FL (ref 11.5–15)
PH (TEMPERATURE CORRECTED): 7.06 (ref 7.35–7.45)
PH UA: 5.5 (ref 5–9)
PLATELET # BLD: 119 E9/L (ref 130–450)
PMV BLD AUTO: 10.2 FL (ref 7–12)
PO2 (TEMP CORRECTED): 94 MMHG (ref 60–80)
POSITIVE END EXP PRESS: 12 CMH2O
POTASSIUM REFLEX MAGNESIUM: 4.4 MMOL/L (ref 3.5–5)
PRO-BNP: 1040 PG/ML (ref 0–125)
PROTEIN UA: >=300 MG/DL
PROTHROMBIN TIME: 11.2 SEC (ref 9.3–12.4)
RBC # BLD: 4.71 E12/L (ref 3.8–5.8)
RBC UA: ABNORMAL /HPF (ref 0–2)
RESPIRATORY RATE: 16 B/MIN
RESPIRATORY SYNCYTIAL VIRUS BY PCR: NOT DETECTED
SARS-COV-2, NAAT: NOT DETECTED
SEDIMENTATION RATE, ERYTHROCYTE: 2 MM/HR (ref 0–15)
SODIUM BLD-SCNC: 137 MMOL/L (ref 132–146)
SOURCE, BLOOD GAS: ABNORMAL
SPECIFIC GRAVITY UA: 1.02 (ref 1–1.03)
TIDAL VOLUME: 500 ML
TROPONIN: 0.02 NG/ML (ref 0–0.03)
UROBILINOGEN, URINE: 0.2 E.U./DL
WBC # BLD: 8.9 E9/L (ref 4.5–11.5)
WBC UA: ABNORMAL /HPF (ref 0–5)

## 2020-05-07 PROCEDURE — 2580000003 HC RX 258

## 2020-05-07 PROCEDURE — 6360000004 HC RX CONTRAST MEDICATION: Performed by: RADIOLOGY

## 2020-05-07 PROCEDURE — 94002 VENT MGMT INPAT INIT DAY: CPT

## 2020-05-07 PROCEDURE — 96375 TX/PRO/DX INJ NEW DRUG ADDON: CPT

## 2020-05-07 PROCEDURE — 87150 DNA/RNA AMPLIFIED PROBE: CPT

## 2020-05-07 PROCEDURE — 51702 INSERT TEMP BLADDER CATH: CPT

## 2020-05-07 PROCEDURE — 83605 ASSAY OF LACTIC ACID: CPT

## 2020-05-07 PROCEDURE — 0BH18EZ INSERTION OF ENDOTRACHEAL AIRWAY INTO TRACHEA, VIA NATURAL OR ARTIFICIAL OPENING ENDOSCOPIC: ICD-10-PCS | Performed by: EMERGENCY MEDICINE

## 2020-05-07 PROCEDURE — 74018 RADEX ABDOMEN 1 VIEW: CPT

## 2020-05-07 PROCEDURE — 84484 ASSAY OF TROPONIN QUANT: CPT

## 2020-05-07 PROCEDURE — 85378 FIBRIN DEGRADE SEMIQUANT: CPT

## 2020-05-07 PROCEDURE — 85025 COMPLETE CBC W/AUTO DIFF WBC: CPT

## 2020-05-07 PROCEDURE — 87088 URINE BACTERIA CULTURE: CPT

## 2020-05-07 PROCEDURE — 0100U HC RESPIRPTHGN MULT REV TRANS & AMP PRB TECH 21 TRGT: CPT

## 2020-05-07 PROCEDURE — 83880 ASSAY OF NATRIURETIC PEPTIDE: CPT

## 2020-05-07 PROCEDURE — U0002 COVID-19 LAB TEST NON-CDC: HCPCS

## 2020-05-07 PROCEDURE — 85730 THROMBOPLASTIN TIME PARTIAL: CPT

## 2020-05-07 PROCEDURE — 2500000003 HC RX 250 WO HCPCS: Performed by: EMERGENCY MEDICINE

## 2020-05-07 PROCEDURE — 87040 BLOOD CULTURE FOR BACTERIA: CPT

## 2020-05-07 PROCEDURE — 71045 X-RAY EXAM CHEST 1 VIEW: CPT

## 2020-05-07 PROCEDURE — 99222 1ST HOSP IP/OBS MODERATE 55: CPT | Performed by: INTERNAL MEDICINE

## 2020-05-07 PROCEDURE — 87186 SC STD MICRODIL/AGAR DIL: CPT

## 2020-05-07 PROCEDURE — 87077 CULTURE AEROBIC IDENTIFY: CPT

## 2020-05-07 PROCEDURE — 82803 BLOOD GASES ANY COMBINATION: CPT

## 2020-05-07 PROCEDURE — 85651 RBC SED RATE NONAUTOMATED: CPT

## 2020-05-07 PROCEDURE — 94760 N-INVAS EAR/PLS OXIMETRY 1: CPT

## 2020-05-07 PROCEDURE — 96374 THER/PROPH/DIAG INJ IV PUSH: CPT

## 2020-05-07 PROCEDURE — 2000000000 HC ICU R&B

## 2020-05-07 PROCEDURE — 8E0ZXY6 ISOLATION: ICD-10-PCS | Performed by: EMERGENCY MEDICINE

## 2020-05-07 PROCEDURE — 82728 ASSAY OF FERRITIN: CPT

## 2020-05-07 PROCEDURE — 99291 CRITICAL CARE FIRST HOUR: CPT

## 2020-05-07 PROCEDURE — 87502 INFLUENZA DNA AMP PROBE: CPT

## 2020-05-07 PROCEDURE — 0D9670Z DRAINAGE OF STOMACH WITH DRAINAGE DEVICE, VIA NATURAL OR ARTIFICIAL OPENING: ICD-10-PCS | Performed by: EMERGENCY MEDICINE

## 2020-05-07 PROCEDURE — 5A1935Z RESPIRATORY VENTILATION, LESS THAN 24 CONSECUTIVE HOURS: ICD-10-PCS | Performed by: EMERGENCY MEDICINE

## 2020-05-07 PROCEDURE — 84145 PROCALCITONIN (PCT): CPT

## 2020-05-07 PROCEDURE — 87450 HC DIRECT STREP B ANTIGEN: CPT

## 2020-05-07 PROCEDURE — 2580000003 HC RX 258: Performed by: EMERGENCY MEDICINE

## 2020-05-07 PROCEDURE — 85610 PROTHROMBIN TIME: CPT

## 2020-05-07 PROCEDURE — 6360000002 HC RX W HCPCS: Performed by: EMERGENCY MEDICINE

## 2020-05-07 PROCEDURE — 71275 CT ANGIOGRAPHY CHEST: CPT

## 2020-05-07 PROCEDURE — 93005 ELECTROCARDIOGRAM TRACING: CPT | Performed by: EMERGENCY MEDICINE

## 2020-05-07 PROCEDURE — 85384 FIBRINOGEN ACTIVITY: CPT

## 2020-05-07 PROCEDURE — 86140 C-REACTIVE PROTEIN: CPT

## 2020-05-07 PROCEDURE — 81001 URINALYSIS AUTO W/SCOPE: CPT

## 2020-05-07 PROCEDURE — 80048 BASIC METABOLIC PNL TOTAL CA: CPT

## 2020-05-07 RX ORDER — PROPOFOL 10 MG/ML
20 INJECTION, EMULSION INTRAVENOUS
Status: DISCONTINUED | OUTPATIENT
Start: 2020-05-07 | End: 2020-05-09

## 2020-05-07 RX ORDER — 0.9 % SODIUM CHLORIDE 0.9 %
500 INTRAVENOUS SOLUTION INTRAVENOUS ONCE
Status: COMPLETED | OUTPATIENT
Start: 2020-05-07 | End: 2020-05-08

## 2020-05-07 RX ORDER — PROPOFOL 10 MG/ML
10 INJECTION, EMULSION INTRAVENOUS ONCE
Status: COMPLETED | OUTPATIENT
Start: 2020-05-07 | End: 2020-05-07

## 2020-05-07 RX ORDER — ROCURONIUM BROMIDE 10 MG/ML
100 INJECTION, SOLUTION INTRAVENOUS ONCE
Status: COMPLETED | OUTPATIENT
Start: 2020-05-07 | End: 2020-05-07

## 2020-05-07 RX ORDER — ALLOPURINOL 100 MG/1
100 TABLET ORAL EVERY MORNING
Status: DISCONTINUED | OUTPATIENT
Start: 2020-05-08 | End: 2020-05-11 | Stop reason: HOSPADM

## 2020-05-07 RX ORDER — 0.9 % SODIUM CHLORIDE 0.9 %
250 INTRAVENOUS SOLUTION INTRAVENOUS ONCE
Status: COMPLETED | OUTPATIENT
Start: 2020-05-07 | End: 2020-05-07

## 2020-05-07 RX ORDER — ACETAMINOPHEN 500 MG
500 TABLET ORAL EVERY 4 HOURS PRN
Status: DISCONTINUED | OUTPATIENT
Start: 2020-05-07 | End: 2020-05-11 | Stop reason: HOSPADM

## 2020-05-07 RX ORDER — THIAMINE HYDROCHLORIDE 100 MG/ML
200 INJECTION, SOLUTION INTRAMUSCULAR; INTRAVENOUS ONCE
Status: DISCONTINUED | OUTPATIENT
Start: 2020-05-07 | End: 2020-05-07 | Stop reason: SDUPTHER

## 2020-05-07 RX ORDER — ETOMIDATE 2 MG/ML
20 INJECTION INTRAVENOUS ONCE
Status: COMPLETED | OUTPATIENT
Start: 2020-05-07 | End: 2020-05-07

## 2020-05-07 RX ORDER — PANTOPRAZOLE SODIUM 40 MG/10ML
40 INJECTION, POWDER, LYOPHILIZED, FOR SOLUTION INTRAVENOUS 2 TIMES DAILY
Status: DISCONTINUED | OUTPATIENT
Start: 2020-05-07 | End: 2020-05-09

## 2020-05-07 RX ORDER — ACETAMINOPHEN 650 MG/1
650 SUPPOSITORY RECTAL EVERY 4 HOURS PRN
Status: DISCONTINUED | OUTPATIENT
Start: 2020-05-07 | End: 2020-05-11 | Stop reason: HOSPADM

## 2020-05-07 RX ORDER — MEPERIDINE HYDROCHLORIDE 25 MG/ML
25 INJECTION INTRAMUSCULAR; INTRAVENOUS; SUBCUTANEOUS
Status: DISCONTINUED | OUTPATIENT
Start: 2020-05-07 | End: 2020-05-11 | Stop reason: HOSPADM

## 2020-05-07 RX ADMIN — PROPOFOL 50 MCG/KG/MIN: 10 INJECTION, EMULSION INTRAVENOUS at 21:24

## 2020-05-07 RX ADMIN — WATER 2 G: 1 INJECTION INTRAMUSCULAR; INTRAVENOUS; SUBCUTANEOUS at 21:30

## 2020-05-07 RX ADMIN — SODIUM CHLORIDE 500 ML: 9 INJECTION, SOLUTION INTRAVENOUS at 20:29

## 2020-05-07 RX ADMIN — AZITHROMYCIN MONOHYDRATE 500 MG: 500 INJECTION, POWDER, LYOPHILIZED, FOR SOLUTION INTRAVENOUS at 21:30

## 2020-05-07 RX ADMIN — SODIUM CHLORIDE 250 ML: 9 INJECTION, SOLUTION INTRAVENOUS at 18:46

## 2020-05-07 RX ADMIN — ROCURONIUM BROMIDE 100 MG: 10 INJECTION INTRAVENOUS at 19:20

## 2020-05-07 RX ADMIN — ETOMIDATE 20 MG: 2 INJECTION INTRAVENOUS at 19:20

## 2020-05-07 RX ADMIN — IOPAMIDOL 100 ML: 755 INJECTION, SOLUTION INTRAVENOUS at 18:24

## 2020-05-07 RX ADMIN — Medication 50 MCG/HR: at 21:33

## 2020-05-07 RX ADMIN — PROPOFOL 10 MCG/KG/MIN: 10 INJECTION, EMULSION INTRAVENOUS at 19:32

## 2020-05-07 ASSESSMENT — ENCOUNTER SYMPTOMS
SORE THROAT: 0
BACK PAIN: 0
SINUS PRESSURE: 0
CONSTIPATION: 0
DIARRHEA: 0
SHORTNESS OF BREATH: 1
COUGH: 1
EYE DISCHARGE: 0
EYE REDNESS: 0
VOMITING: 0
ABDOMINAL PAIN: 0
SPUTUM PRODUCTION: 1
RHINORRHEA: 0
BLOOD IN STOOL: 0
EYE PAIN: 0
CHEST TIGHTNESS: 1
WHEEZING: 0
NAUSEA: 0

## 2020-05-07 ASSESSMENT — PULMONARY FUNCTION TESTS
PIF_VALUE: 32
PIF_VALUE: 33
PIF_VALUE: 31

## 2020-05-07 ASSESSMENT — VISUAL ACUITY: OU: 1

## 2020-05-07 ASSESSMENT — PAIN SCALES - GENERAL
PAINLEVEL_OUTOF10: 2
PAINLEVEL_OUTOF10: 0
PAINLEVEL_OUTOF10: 2

## 2020-05-07 NOTE — ED PROVIDER NOTES
hypoxia and hypercapnia ([address]):   Heart failure, unspecified HF chronicity, unspecified heart failure type ([address]):   Pneumonia due to organism:   Suspected COVID-19 virus infection:   Diagnosis management comments: Patient arrived with complaint of shortness of breath. His condition deteriorated after which RSI was successfully performed. Patient's CTA chest is unremarkable for PE. Patient is possibly infected with COVID-19. Cultures are obtained and he started on IV ABx for possible bacterial infection. His EKG is unremarkable for STEMI. His troponin is WNL. A right IJ is placed due to poor peripheral access. Patient is admitted for continued work-up, treatment, and monitoring of his conditions. EKG: This EKG is signed and interpreted by me. Rate: 104  Rhythm: Sinus  Interpretation: non-specific ST/T-wave changes; LAD; LAFB; PVCs  Comparison: changes compared to previous EKG      Intubation Procedure Note    Indication: Respiratory failure    Consent: Unable to be obtained due to the emergent nature of this procedure. Medications Used: 20 mg of etomidate intravenously and 100 mg of rocuronium intravenously    Procedure: The patient was placed in the appropriate position. Cricoid pressure was not required. Intubation was performed by indirect laryngoscopy using a bronchoscope and an 8.0 cuffed endotracheal tube. The cuff was then inflated and the tube was secured appropriately at a distance of 27 cm to the dental ridge. Initial confirmation of placement included bilateral breath sounds, an end tidal CO2 detector, absence of sounds over the stomach, tube fogging and adequate chest rise. A chest x-ray to verify correct placement of the tube showed appropriate tube position. The patient tolerated the procedure well.      Complications: None      Central Line Placement Procedure Note    Indication: poor peripheral access    Consent: Unable to be obtained due to the emergent nature of this procedure. Procedure: The patient was positioned appropriately and the skin over the right internal jugular vein was prepped with Chloraprep. Local anesthesia was not performed due to the emergent nature of this procedure. A large bore needle was used to identify the vein. A guide wire was then inserted into the vein through the needle. A triple lumen catheter was then inserted into the vessel over the guide wire using the Seldinger technique. All ports showed good, free flowing blood return and were flushed with saline solution. The catheter was then securely fastened to the skin with suture at 15 cm. Two sutures were placed into the kit included tube clamp, proximal eyelets and a suture end from each of the securing sutures was extended around the catheter and tied to the proximal eyelets as an added measure to prevent dislodgement. An antibiotic disk was placed and the site was then covered with a sterile dressing. A post procedure X-ray was ordered and showed good line position. The patient tolerated the procedure well. Complications: None         --------------------------------------------- PAST HISTORY ---------------------------------------------  Past Medical History:  has a past medical history of Arthritis, Diabetes mellitus [address] [address] Full dentures, Gout, History of blood transfusion, and Sleep apnea. Past Surgical History:  has a past surgical history that includes Hip Closed Reduction (Right, 7/14/2013); joint replacement (Right, 16 yrs ago); Knee arthroscopy (Bilateral); other surgical history (12/11/2014); Hip Closed Reduction (Right, 6/5/2015); hernia repair; Revision total hip arthroplasty (Right, 06/29/2015); Colonoscopy; Upper gastrointestinal endoscopy (N/A, 10/17/2018); and pr colonoscopy flx dx w/collj spec when pfrmd (N/A, 10/19/2018). Social History:  reports that he has been smoking. He has smoked for the past 30.00 years.  He has quit using smokeless tobacco. He reports mL/min/1.73    GFR African American >60     Calcium 8.7 8.6 - 10.2 mg/dL   D-Dimer, Quantitative   Result Value Ref Range    D-Dimer, Quant 341 ng/mL DDU   COVID-19   Result Value Ref Range    SARS-CoV-2, NAAT Not Detected Not Detected   Lactate, Sepsis   Result Value Ref Range    Lactic Acid, Sepsis 1.5 0.5 - 1.9 mmol/L   Urinalysis with Microscopic   Result Value Ref Range    Color, UA Yellow Straw/Yellow    Clarity, UA Clear Clear    Glucose, Ur Negative Negative mg/dL    Bilirubin Urine Negative Negative    Ketones, Urine Negative Negative mg/dL    Specific Gravity, UA 1.020 1.005 - 1.030    Blood, Urine MODERATE (A) Negative    pH, UA 5.5 5.0 - 9.0    Protein, UA >=300 (A) Negative mg/dL    Urobilinogen, Urine 0.2 <2.0 E.U./dL    Nitrite, Urine Negative Negative    Leukocyte Esterase, Urine Negative Negative    WBC, UA 2-5 0 - 5 /HPF    RBC, UA 5-10 (A) 0 - 2 /HPF    Epithelial Cells, UA FEW /HPF    Bacteria, UA MODERATE (A) None Seen /HPF    Amorphous, UA RARE    Arterial Blood Gas, Respiratory Only   Result Value Ref Range    Source: Arterial     FIO2 Arterial 100.0     Pt Temp 37.0     PH (TEMPERATURE CORRECTED) 7.063 (LL) 7.350 - 7.450    PCO2 (TEMP CORRECTED) 107.2 (HH) 35.0 - 45.0 mmHg    PO2 (TEMP CORRECTED) 94.0 (H) 60.0 - 80.0 mmHg    HCO3, Arterial 30.5 (H) 22.0 - 26.0 mmol/L    B.E. -4.3 (L) -3.0 - 0.0 mmol/L    O2 Sat 92.0 92.0 - 98.5 %          DEVICE [id number]     Critical Notification Yes     Mode AC     Tidal Volume 500 mL    Positive End EXP Press 12 cmH2O    Respiratory Rate 16 b/min    Delivery Systems Adult[employer]    Protime-INR   Result Value Ref Range    Protime 11.2 9.3 - 12.4 sec    INR 0.9    APTT   Result Value Ref Range    aPTT 28.4 24.5 - 35.1 sec   Fibrinogen   Result Value Ref Range    Fibrinogen 508 225 - 540 mg/dL   EKG 12 Lead   Result Value Ref Range    Ventricular Rate 104 BPM    Atrial Rate 104 BPM    P-R Interval 166 ms    QRS Duration 92 ms    Q-T

## 2020-05-07 NOTE — H&P
wall:    No tenderness or deformity   Heart:    Regular rate and rhythm, S1 and S2 normal, no rub or gallop. Abdomen:     Soft,  bowel sounds active    Inc habitus   Genitalia & Rectal:    Deferred. Extremities x4:   Extremities edemaouts, sweating. no cyanosis, no clubbing   Musculoskeletal:      NO active synovitis or swollen b/l wrists, 2-5 MCPs examined   Skin:   Trace edema BLEs   no ACUTE rashes or lesions in lower legs and arms examined   Lymph nodes:   Cervical nodes grossly normal   Neurologic:  .unable to test     LABS:  CBC:   Lab Results   Component Value Date    WBC 8.9 2020    RBC 4.71 2020    HGB 15.5 2020    HCT 46.8 2020     2020    MCV 99.4 2020     BMP:    Lab Results   Component Value Date     2020    K 4.4 2020    CL 99 2020    CO2 28 2020    BUN 17 2020    CREATININE 1.0 2020    GLUCOSE 125 2020    CALCIUM 8.7 2020     Hepatic Function Panel:    Lab Results   Component Value Date    ALKPHOS 63 10/17/2018    AST 11 10/17/2018    ALT 9 10/17/2018    PROT 4.9 10/17/2018    LABALBU 2.9 10/17/2018    BILITOT 0.3 10/17/2018     Magnesium:  No results found for: MG    PT/INR:    Lab Results   Component Value Date    PROTIME 12.4 10/16/2018    INR 1.1 10/16/2018     U/A: No results found for: NITRITE, LEUKOCYTESUR, PHUR, WBCUA, RBCUA, BACTERIA, SPECGRAV, BLOODU, GLUCOSEU  ABG:  No results found for: PHART, YYA2ION, PO2ART, G4VYQVMV, HEN8WCL, BEART  TSH:  No results found for: TSH  Cardiac Enzymes:   Lab Results   Component Value Date    TROPONINI 0.02 2020       Radiology: Xr Chest Portable    Result Date: 2020  Patient MRN:  [id number] : 1955 Age: 72 years Gender: Male Order Date:  2020 5:15 PM EXAM: XR CHEST PORTABLE COMPARISON: 2006 INDICATION:  sob; cough sob; cough FINDINGS: The heart is normal in size. No focal airspace opacity. There is no pleural effusion.  There

## 2020-05-07 NOTE — ED NOTES
Bed: 08  Expected date:   Expected time:   Means of arrival:   Comments:  Jessica Belcher RN  05/07/20 4769

## 2020-05-08 ENCOUNTER — APPOINTMENT (OUTPATIENT)
Dept: GENERAL RADIOLOGY | Age: 65
DRG: 208 | End: 2020-05-08
Payer: MEDICARE

## 2020-05-08 LAB
AADO2: 268.2 MMHG
AADO2: 467.3 MMHG
ALBUMIN SERPL-MCNC: 3.2 G/DL (ref 3.5–5.2)
ALP BLD-CCNC: 64 U/L (ref 40–129)
ALT SERPL-CCNC: 69 U/L (ref 0–40)
ANION GAP SERPL CALCULATED.3IONS-SCNC: 10 MMOL/L (ref 7–16)
APTT: 28.5 SEC (ref 24.5–35.1)
AST SERPL-CCNC: 72 U/L (ref 0–39)
B.E.: -2.8 MMOL/L (ref -3–3)
B.E.: -3.5 MMOL/L (ref -3–3)
BASOPHILS ABSOLUTE: 0.02 E9/L (ref 0–0.2)
BASOPHILS RELATIVE PERCENT: 0.2 % (ref 0–2)
BILIRUB SERPL-MCNC: 0.4 MG/DL (ref 0–1.2)
BUN BLDV-MCNC: 20 MG/DL (ref 8–23)
C-REACTIVE PROTEIN: 0.7 MG/DL (ref 0–0.4)
C-REACTIVE PROTEIN: 1.6 MG/DL (ref 0–0.4)
CALCIUM SERPL-MCNC: 7.6 MG/DL (ref 8.6–10.2)
CHLORIDE BLD-SCNC: 101 MMOL/L (ref 98–107)
CO2: 26 MMOL/L (ref 22–29)
COHB: 1 % (ref 0–1.5)
COHB: 1.3 % (ref 0–1.5)
CREAT SERPL-MCNC: 1 MG/DL (ref 0.7–1.2)
CRITICAL: ABNORMAL
CRITICAL: ABNORMAL
DATE ANALYZED: ABNORMAL
DATE ANALYZED: ABNORMAL
DATE OF COLLECTION: ABNORMAL
DATE OF COLLECTION: ABNORMAL
EKG ATRIAL RATE: 104 BPM
EKG P AXIS: 71 DEGREES
EKG P-R INTERVAL: 166 MS
EKG Q-T INTERVAL: 346 MS
EKG QRS DURATION: 92 MS
EKG QTC CALCULATION (BAZETT): 454 MS
EKG R AXIS: -51 DEGREES
EKG T AXIS: 78 DEGREES
EKG VENTRICULAR RATE: 104 BPM
EOSINOPHILS ABSOLUTE: 0.01 E9/L (ref 0.05–0.5)
EOSINOPHILS RELATIVE PERCENT: 0.1 % (ref 0–6)
FERRITIN: 240 NG/ML
FERRITIN: 422 NG/ML
FIBRINOGEN: 466 MG/DL (ref 225–540)
FIO2: 100 %
FIO2: 70 %
GFR AFRICAN AMERICAN: >60
GFR NON-AFRICAN AMERICAN: >60 ML/MIN/1.73
GLUCOSE BLD-MCNC: 153 MG/DL (ref 74–99)
HCO3: 23.1 MMOL/L (ref 22–26)
HCO3: 25.6 MMOL/L (ref 22–26)
HCT VFR BLD CALC: 45.7 % (ref 37–54)
HEMOGLOBIN: 14.7 G/DL (ref 12.5–16.5)
HHB: 0.9 % (ref 0–5)
HHB: 1 % (ref 0–5)
IMMATURE GRANULOCYTES #: 0.06 E9/L
IMMATURE GRANULOCYTES %: 0.6 % (ref 0–5)
INR BLD: 1
L. PNEUMOPHILA SEROGP 1 UR AG: NORMAL
LAB: ABNORMAL
LAB: ABNORMAL
LACTATE DEHYDROGENASE: 248 U/L (ref 135–225)
LACTIC ACID: 1 MMOL/L (ref 0.5–2.2)
LV EF: 50 %
LVEF MODALITY: NORMAL
LYMPHOCYTES ABSOLUTE: 0.73 E9/L (ref 1.5–4)
LYMPHOCYTES RELATIVE PERCENT: 7.1 % (ref 20–42)
Lab: ABNORMAL
Lab: ABNORMAL
MCH RBC QN AUTO: 32.7 PG (ref 26–35)
MCHC RBC AUTO-ENTMCNC: 32.2 % (ref 32–34.5)
MCV RBC AUTO: 101.8 FL (ref 80–99.9)
METHB: 0.3 % (ref 0–1.5)
METHB: 0.3 % (ref 0–1.5)
MODE: AC
MODE: AC
MONOCYTES ABSOLUTE: 0.43 E9/L (ref 0.1–0.95)
MONOCYTES RELATIVE PERCENT: 4.2 % (ref 2–12)
NEUTROPHILS ABSOLUTE: 9.02 E9/L (ref 1.8–7.3)
NEUTROPHILS RELATIVE PERCENT: 87.8 % (ref 43–80)
O2 CONTENT: 21.6 ML/DL
O2 CONTENT: 21.9 ML/DL
O2 SATURATION: 99 % (ref 92–98.5)
O2 SATURATION: 99.1 % (ref 92–98.5)
O2HB: 97.4 % (ref 94–97)
O2HB: 97.8 % (ref 94–97)
OPERATOR ID: ABNORMAL
OPERATOR ID: ABNORMAL
PATIENT TEMP: 37 C
PATIENT TEMP: 37 C
PCO2: 47.3 MMHG (ref 35–45)
PCO2: 59.5 MMHG (ref 35–45)
PDW BLD-RTO: 13.9 FL (ref 11.5–15)
PEEP/CPAP: 16 CMH2O
PEEP/CPAP: 16 CMH2O
PFO2: 1.61 MMHG/%
PFO2: 2.32 MMHG/%
PH BLOOD GAS: 7.25 (ref 7.35–7.45)
PH BLOOD GAS: 7.31 (ref 7.35–7.45)
PLATELET # BLD: 101 E9/L (ref 130–450)
PMV BLD AUTO: 10.3 FL (ref 7–12)
PO2: 161.2 MMHG (ref 75–100)
PO2: 162.6 MMHG (ref 75–100)
POTASSIUM SERPL-SCNC: 5.2 MMOL/L (ref 3.5–5)
PROCALCITONIN: 0.06 NG/ML (ref 0–0.08)
PROTHROMBIN TIME: 11.4 SEC (ref 9.3–12.4)
RBC # BLD: 4.49 E12/L (ref 3.8–5.8)
RI(T): 165 %
RI(T): 290 %
RR MECHANICAL: 24 B/MIN
RR MECHANICAL: 24 B/MIN
SODIUM BLD-SCNC: 137 MMOL/L (ref 132–146)
SOURCE, BLOOD GAS: ABNORMAL
SOURCE, BLOOD GAS: ABNORMAL
STREP PNEUMONIAE ANTIGEN, URINE: NORMAL
THB: 15.5 G/DL (ref 11.5–16.5)
THB: 15.8 G/DL (ref 11.5–16.5)
TIME ANALYZED: 559
TIME ANALYZED: 58
TOTAL PROTEIN: 6 G/DL (ref 6.4–8.3)
TROPONIN: 0.04 NG/ML (ref 0–0.03)
TROPONIN: 0.06 NG/ML (ref 0–0.03)
VT MECHANICAL: 500 ML
VT MECHANICAL: 500 ML
WBC # BLD: 10.3 E9/L (ref 4.5–11.5)

## 2020-05-08 PROCEDURE — 6360000002 HC RX W HCPCS: Performed by: EMERGENCY MEDICINE

## 2020-05-08 PROCEDURE — 2000000000 HC ICU R&B

## 2020-05-08 PROCEDURE — 93010 ELECTROCARDIOGRAM REPORT: CPT | Performed by: INTERNAL MEDICINE

## 2020-05-08 PROCEDURE — 2580000003 HC RX 258: Performed by: INTERNAL MEDICINE

## 2020-05-08 PROCEDURE — 82728 ASSAY OF FERRITIN: CPT

## 2020-05-08 PROCEDURE — 83615 LACTATE (LD) (LDH) ENZYME: CPT

## 2020-05-08 PROCEDURE — 6360000002 HC RX W HCPCS: Performed by: INTERNAL MEDICINE

## 2020-05-08 PROCEDURE — 80053 COMPREHEN METABOLIC PANEL: CPT

## 2020-05-08 PROCEDURE — 2500000003 HC RX 250 WO HCPCS: Performed by: INTERNAL MEDICINE

## 2020-05-08 PROCEDURE — C9113 INJ PANTOPRAZOLE SODIUM, VIA: HCPCS | Performed by: INTERNAL MEDICINE

## 2020-05-08 PROCEDURE — 83520 IMMUNOASSAY QUANT NOS NONAB: CPT

## 2020-05-08 PROCEDURE — 71045 X-RAY EXAM CHEST 1 VIEW: CPT

## 2020-05-08 PROCEDURE — 84484 ASSAY OF TROPONIN QUANT: CPT

## 2020-05-08 PROCEDURE — 87081 CULTURE SCREEN ONLY: CPT

## 2020-05-08 PROCEDURE — 6370000000 HC RX 637 (ALT 250 FOR IP): Performed by: INTERNAL MEDICINE

## 2020-05-08 PROCEDURE — 85730 THROMBOPLASTIN TIME PARTIAL: CPT

## 2020-05-08 PROCEDURE — 94660 CPAP INITIATION&MGMT: CPT

## 2020-05-08 PROCEDURE — 2700000000 HC OXYGEN THERAPY PER DAY

## 2020-05-08 PROCEDURE — 36415 COLL VENOUS BLD VENIPUNCTURE: CPT

## 2020-05-08 PROCEDURE — 93306 TTE W/DOPPLER COMPLETE: CPT

## 2020-05-08 PROCEDURE — 86140 C-REACTIVE PROTEIN: CPT

## 2020-05-08 PROCEDURE — 94003 VENT MGMT INPAT SUBQ DAY: CPT

## 2020-05-08 PROCEDURE — 85384 FIBRINOGEN ACTIVITY: CPT

## 2020-05-08 PROCEDURE — 82805 BLOOD GASES W/O2 SATURATION: CPT

## 2020-05-08 PROCEDURE — 85025 COMPLETE CBC W/AUTO DIFF WBC: CPT

## 2020-05-08 PROCEDURE — 2500000003 HC RX 250 WO HCPCS: Performed by: EMERGENCY MEDICINE

## 2020-05-08 PROCEDURE — 99233 SBSQ HOSP IP/OBS HIGH 50: CPT | Performed by: FAMILY MEDICINE

## 2020-05-08 PROCEDURE — 85610 PROTHROMBIN TIME: CPT

## 2020-05-08 PROCEDURE — 83605 ASSAY OF LACTIC ACID: CPT

## 2020-05-08 PROCEDURE — 6360000004 HC RX CONTRAST MEDICATION: Performed by: INTERNAL MEDICINE

## 2020-05-08 PROCEDURE — 2580000003 HC RX 258: Performed by: EMERGENCY MEDICINE

## 2020-05-08 PROCEDURE — 2580000003 HC RX 258

## 2020-05-08 RX ORDER — SODIUM CHLORIDE 0.9 % (FLUSH) 0.9 %
SYRINGE (ML) INJECTION
Status: COMPLETED
Start: 2020-05-08 | End: 2020-05-08

## 2020-05-08 RX ORDER — FUROSEMIDE 10 MG/ML
40 INJECTION INTRAMUSCULAR; INTRAVENOUS 2 TIMES DAILY
Status: DISCONTINUED | OUTPATIENT
Start: 2020-05-08 | End: 2020-05-08

## 2020-05-08 RX ORDER — SODIUM CHLORIDE 0.9 % (FLUSH) 0.9 %
10 SYRINGE (ML) INJECTION 2 TIMES DAILY
Status: DISCONTINUED | OUTPATIENT
Start: 2020-05-08 | End: 2020-05-11 | Stop reason: HOSPADM

## 2020-05-08 RX ORDER — FUROSEMIDE 10 MG/ML
40 INJECTION INTRAMUSCULAR; INTRAVENOUS DAILY
Status: DISCONTINUED | OUTPATIENT
Start: 2020-05-09 | End: 2020-05-11 | Stop reason: HOSPADM

## 2020-05-08 RX ADMIN — ASCORBIC ACID 1500 MG: 500 INJECTION INTRAVENOUS at 04:37

## 2020-05-08 RX ADMIN — Medication 10 ML: at 08:13

## 2020-05-08 RX ADMIN — PROPOFOL 25 MCG/KG/MIN: 10 INJECTION, EMULSION INTRAVENOUS at 08:04

## 2020-05-08 RX ADMIN — Medication 10 ML: at 21:05

## 2020-05-08 RX ADMIN — ENOXAPARIN SODIUM 60 MG: 40 INJECTION SUBCUTANEOUS at 21:04

## 2020-05-08 RX ADMIN — HYDROCORTISONE SODIUM SUCCINATE 50 MG: 100 INJECTION, POWDER, FOR SOLUTION INTRAMUSCULAR; INTRAVENOUS at 00:16

## 2020-05-08 RX ADMIN — PROPOFOL 20 MCG/KG/MIN: 10 INJECTION, EMULSION INTRAVENOUS at 04:35

## 2020-05-08 RX ADMIN — PANTOPRAZOLE SODIUM 40 MG: 40 INJECTION, POWDER, FOR SOLUTION INTRAVENOUS at 00:25

## 2020-05-08 RX ADMIN — CALCIUM GLUCONATE 1 G: 98 INJECTION, SOLUTION INTRAVENOUS at 09:45

## 2020-05-08 RX ADMIN — PANTOPRAZOLE SODIUM 40 MG: 40 INJECTION, POWDER, FOR SOLUTION INTRAVENOUS at 21:04

## 2020-05-08 RX ADMIN — PERFLUTREN 1.65 MG: 6.52 INJECTION, SUSPENSION INTRAVENOUS at 11:30

## 2020-05-08 RX ADMIN — FUROSEMIDE 40 MG: 10 INJECTION, SOLUTION INTRAMUSCULAR; INTRAVENOUS at 11:28

## 2020-05-08 RX ADMIN — ASCORBIC ACID 1500 MG: 500 INJECTION INTRAVENOUS at 00:18

## 2020-05-08 RX ADMIN — ALLOPURINOL 100 MG: 100 TABLET ORAL at 08:13

## 2020-05-08 RX ADMIN — PROPOFOL 30 MCG/KG/MIN: 10 INJECTION, EMULSION INTRAVENOUS at 00:15

## 2020-05-08 RX ADMIN — PANTOPRAZOLE SODIUM 40 MG: 40 INJECTION, POWDER, FOR SOLUTION INTRAVENOUS at 08:13

## 2020-05-08 RX ADMIN — THIAMINE HYDROCHLORIDE 200 MG: 100 INJECTION, SOLUTION INTRAMUSCULAR; INTRAVENOUS at 00:19

## 2020-05-08 RX ADMIN — Medication 35 MCG/HR: at 08:09

## 2020-05-08 RX ADMIN — ENOXAPARIN SODIUM 60 MG: 40 INJECTION SUBCUTANEOUS at 00:22

## 2020-05-08 ASSESSMENT — PULMONARY FUNCTION TESTS
PIF_VALUE: 19
PIF_VALUE: 32
PIF_VALUE: 30
PIF_VALUE: 18
PIF_VALUE: 18
PIF_VALUE: 31
PIF_VALUE: 27
PIF_VALUE: 33
PIF_VALUE: 30
PIF_VALUE: 30
PIF_VALUE: 29
PIF_VALUE: 30
PIF_VALUE: 29
PIF_VALUE: 30
PIF_VALUE: 31
PIF_VALUE: 30

## 2020-05-08 ASSESSMENT — PAIN SCALES - GENERAL
PAINLEVEL_OUTOF10: 0

## 2020-05-08 NOTE — CONSULTS
Max: 100 %  Oxygen Amount and Delivery: O2 Flow Rate (L/min): 4 L/min      I/O (24 Hours)    Patient Vitals for the past 8 hrs:   BP Temp Temp src Pulse Resp SpO2   05/08/20 0823 -- -- -- 71 24 96 %   05/08/20 0700 105/65 -- -- 72 24 97 %   05/08/20 0612 -- -- -- 73 24 97 %   05/08/20 0600 106/66 -- -- 72 24 99 %   05/08/20 0500 100/66 -- -- 74 23 98 %   05/08/20 0422 -- -- -- 79 23 99 %   05/08/20 0400 95/63 97.1 °F (36.2 °C) Infrared 72 24 99 %   05/08/20 0300 87/60 -- -- 71 24 99 %   05/08/20 0200 (!) 89/56 -- -- 72 24 99 %   05/08/20 0100 84/64 -- -- 78 24 98 %       Intake/Output Summary (Last 24 hours) at 5/8/2020 0846  Last data filed at 5/8/2020 0654  Gross per 24 hour   Intake 535 ml   Output 375 ml   Net 160 ml     I/O last 3 completed shifts: In: 535 [I.V.:285; IV Piggyback:250]  Out: 375 [Urine:375]   Date 05/08/20 0000 - 05/08/20 2359   Shift 5746-4513 2339-5948 6238-2940 24 Hour Total   INTAKE   I.V.(mL/kg) 285(1.6)   285(1.6)   IV Piggyback(mL/kg) 250(1.4)   250(1.4)   Shift Total(mL/kg) 535(2.9)   535(2.9)   OUTPUT   Urine(mL/kg/hr) 375(0.3)   375   Shift Total(mL/kg) 375(2.1)   375(2.1)   Weight (kg) 181.4 181.4 181.4 181.4     Patient Vitals for the past 96 hrs (Last 3 readings):   Weight   05/07/20 1651 (!) 400 lb (181.4 kg)         Drains/Tubes Outputs  None so far  Exam         PHYSICAL EXAM:    Physical Exam  Vitals signs and nursing note reviewed. Constitutional:       Appearance: He is well-developed. HENT:      Head: Normocephalic and atraumatic. No raccoon eyes or Lyn's sign. Nose: Nose normal.      Mouth/Throat:      Mouth: Mucous membranes are dry. Pharynx: Oropharynx is clear. Uvula midline. Eyes:      General: Lids are normal. Vision grossly intact. Extraocular Movements: Extraocular movements intact. Conjunctiva/sclera: Conjunctivae normal.      Pupils: Pupils are equal, round, and reactive to light.    Neck:      Musculoskeletal: Normal range of motion and

## 2020-05-08 NOTE — PROGRESS NOTES
Date: 5/8/2020    Time: 4:27 PM    Patient Placed On BIPAP/CPAP/ Non-Invasive Ventilation? Yes    If no must comment. Facial area red/color change? No           If YES are Blister/Lesion present? No   If yes must notify nursing staff  BIPAP/CPAP skin barrier?   Yes    Skin barrier type:duoderm       Comments:        Jc Dixon

## 2020-05-08 NOTE — PROGRESS NOTES
[facility] Progress Note    Admitting Date and Time: 5/7/2020  5:01 PM  Admit Dx: Acute respiratory failure with hypoxia ([facility] [address] [J96.01]    Subjective:  Patient is being followed for Acute respiratory failure with hypoxia ([facility] [address] [J96.01]   Admitted to ICU on 5/7/20 with worsening BERNAL and hypoxia, requiring intubation. Initial COVID testing negative. Patient had non-productive cough, chest tightness, and conversational dyspnea. Hypoxic in ER. Also has history of DM2, Tobacco use with ? COPD, CHAITANYA, morbid obesity and gout. Pt  Intubated. Denied any pain, suprisingly calm  Per RN: afebrile, respirations 24, BP mildly hypotensive    ROS: unable to obtain due to intubation.      calcium gluconate IVPB  1 g Intravenous Once    allopurinol  100 mg Oral QAM    [Held by provider] ascorbic acid  1,500 mg Intravenous Q6H    enoxaparin  60 mg Subcutaneous Daily    pantoprazole  40 mg Intravenous BID     acetaminophen, 500 mg, Q4H PRN  acetaminophen, 650 mg, Q4H PRN  meperidine, 25 mg, Q3H PRN         Objective:    /65   Pulse 71   Temp 97.1 °F (36.2 °C) (Infrared)   Resp 24   Ht 6' (1.829 m)   Wt (!) 400 lb (181.4 kg)   SpO2 96%   BMI 54.25 kg/m²     General Appearance: intubated but alert  Skin: warm and dry  Head: normocephalic and atraumatic  Eyes: pupils equal, round, and reactive to light, extraocular eye movements intact, conjunctivae normal  Neck: neck supple and non tender without mass   Pulmonary/Chest: clear to auscultation bilaterally- no wheezes, rales or rhonchi, normal air movement, no respiratory distress  Cardiovascular: normal rate, normal S1 and S2 and no carotid bruits  Abdomen: soft, non-tender, non-distended, normal bowel sounds, no masses or organomegaly  Extremities: no cyanosis, no clubbing and no edema  Neurologic: no cranial nerve deficit and speech normal        Recent Labs     05/07/20  1723 05/08/20  0525    137   K 4.4 5.2*   CL 99 101   CO2 28 26 BUN 17 20   CREATININE 1.0 1.0   GLUCOSE 125* 153*   CALCIUM 8.7 7.6*       Recent Labs     05/07/20  1723 05/08/20  0525   WBC 8.9 10.3   RBC 4.71 4.49   HGB 15.5 14.7   HCT 46.8 45.7   MCV 99.4 101.8*   MCH 32.9 32.7   MCHC 33.1 32.2   RDW 13.8 13.9   * 101*   MPV 10.2 10.3     Procalcitonin- 0.06    Results for Aravind Solano (MRN [id number]) as of 5/8/2020 08:37   Ref. Range 5/8/2020 05:59   Source: Unknown Blood Arterial   pH, Blood Gas Latest Ref Range: 7.350 - 7.450  7.307 (L)   PCO2 Latest Ref Range: 35.0 - 45.0 mmHg 47.3 (H)   pO2 Latest Ref Range: 75.0 - 100.0 mmHg 162.6 (H)   HCO3 Latest Ref Range: 22.0 - 26.0 mmol/L 23.1   Base Excess Latest Ref Range: -3.0 - 3.0 mmol/L -3.5 (L)   O2 Sat Latest Ref Range: 92.0 - 98.5 % 99.1 (H)   tHb (est) Latest Ref Range: 11.5 - 16.5 g/dL 15.5   O2Hb Latest Ref Range: 94.0 - 97.0 % 97.8 (H)   COHb Latest Ref Range: 0.0 - 1.5 % 1.0   MetHb Latest Ref Range: 0.0 - 1.5 % 0.3   HHb Latest Ref Range: 0.0 - 5.0 % 0.9   O2 Content Latest Units: mL/dL 21.6   AaDO2 Latest Units: mmHg 268.2   RI(T) Latest Units: % 165   FIO2 Latest Units: % 70.0   PO2/FIO2 Latest Units: mmHg/% 2.32   Pt Temp Latest Units: C 37.0   Critical(s) Notified Unknown . No Critical Values   Time Analyzed Unknown 0559   Mode Unknown AC   Peep/Cpap Latest Units: cmH2O 16.0   Vt Mechanical Latest Units: mL 500.0   Rr Mechanical Latest Units: b/min 24.0     Radiology:       CT scan 5/7/20  1. No evidence of pulmonary embolism.       2. Findings suggest pulmonary vascular congestion with developing   interstitial pulmonary edema or bilateral bronchopneumonia.       3. Prominent and mildly enlarged mediastinal and perihilar lymph   nodes.          Assessment:    Principal Problem:    Acute respiratory failure with hypoxia (HCC)  Active Problems:    Diabetes mellitus type 2, controlled ([facility] [address]    Viral pneumonia    Morbid obesity with BMI of 50.0-59.9, adult ([facility] [address]  Resolved Problems:    * No resolved

## 2020-05-09 ENCOUNTER — APPOINTMENT (OUTPATIENT)
Dept: GENERAL RADIOLOGY | Age: 65
DRG: 208 | End: 2020-05-09
Payer: MEDICARE

## 2020-05-09 PROBLEM — R78.81 BACTEREMIA: Status: ACTIVE | Noted: 2020-05-09

## 2020-05-09 LAB
ACINETOBACTER BAUMANNII BY PCR: NOT DETECTED
ANION GAP SERPL CALCULATED.3IONS-SCNC: 10 MMOL/L (ref 7–16)
APTT: 27.3 SEC (ref 24.5–35.1)
BASOPHILS ABSOLUTE: 0.03 E9/L (ref 0–0.2)
BASOPHILS RELATIVE PERCENT: 0.4 % (ref 0–2)
BOTTLE TYPE: ABNORMAL
BUN BLDV-MCNC: 13 MG/DL (ref 8–23)
CALCIUM SERPL-MCNC: 8.3 MG/DL (ref 8.6–10.2)
CANDIDA ALBICANS BY PCR: NOT DETECTED
CANDIDA GLABRATA BY PCR: NOT DETECTED
CANDIDA KRUSEI BY PCR: NOT DETECTED
CANDIDA PARAPSILOSIS BY PCR: NOT DETECTED
CANDIDA TROPICALIS BY PCR: NOT DETECTED
CHLORIDE BLD-SCNC: 101 MMOL/L (ref 98–107)
CO2: 32 MMOL/L (ref 22–29)
CREAT SERPL-MCNC: 0.8 MG/DL (ref 0.7–1.2)
ENTEROBACTER CLOACAE COMPLEX BY PCR: NOT DETECTED
ENTEROBACTERALES BY PCR: NOT DETECTED
ENTEROCOCCUS BY PCR: NOT DETECTED
EOSINOPHILS ABSOLUTE: 0.31 E9/L (ref 0.05–0.5)
EOSINOPHILS RELATIVE PERCENT: 4 % (ref 0–6)
ESCHERICHIA COLI BY PCR: NOT DETECTED
FIBRINOGEN: 569 MG/DL (ref 225–540)
GFR AFRICAN AMERICAN: >60
GFR NON-AFRICAN AMERICAN: >60 ML/MIN/1.73
GLUCOSE BLD-MCNC: 114 MG/DL (ref 74–99)
HAEMOPHILUS INFLUENZAE BY PCR: NOT DETECTED
HCT VFR BLD CALC: 44.3 % (ref 37–54)
HEMOGLOBIN: 14.2 G/DL (ref 12.5–16.5)
IMMATURE GRANULOCYTES #: 0.06 E9/L
IMMATURE GRANULOCYTES %: 0.8 % (ref 0–5)
INR BLD: 1
KLEBSIELLA OXYTOCA BY PCR: NOT DETECTED
KLEBSIELLA PNEUMONIAE GROUP BY PCR: NOT DETECTED
LISTERIA MONOCYTOGENES BY PCR: NOT DETECTED
LYMPHOCYTES ABSOLUTE: 1.91 E9/L (ref 1.5–4)
LYMPHOCYTES RELATIVE PERCENT: 24.8 % (ref 20–42)
MCH RBC QN AUTO: 32.7 PG (ref 26–35)
MCHC RBC AUTO-ENTMCNC: 32.1 % (ref 32–34.5)
MCV RBC AUTO: 102.1 FL (ref 80–99.9)
METHICILLIN RESISTANCE MECA/C  BY PCR: NOT DETECTED
MONOCYTES ABSOLUTE: 0.53 E9/L (ref 0.1–0.95)
MONOCYTES RELATIVE PERCENT: 6.9 % (ref 2–12)
NEISSERIA MENINGITIDIS BY PCR: NOT DETECTED
NEUTROPHILS ABSOLUTE: 4.85 E9/L (ref 1.8–7.3)
NEUTROPHILS RELATIVE PERCENT: 63.1 % (ref 43–80)
ORDER NUMBER: ABNORMAL
ORGANISM: ABNORMAL
PDW BLD-RTO: 13.7 FL (ref 11.5–15)
PLATELET # BLD: 105 E9/L (ref 130–450)
PMV BLD AUTO: 10.2 FL (ref 7–12)
POTASSIUM SERPL-SCNC: 3.8 MMOL/L (ref 3.5–5)
PROTEUS BY PCR: NOT DETECTED
PROTHROMBIN TIME: 12.3 SEC (ref 9.3–12.4)
PSEUDOMONAS AERUGINOSA BY PCR: NOT DETECTED
RBC # BLD: 4.34 E12/L (ref 3.8–5.8)
SERRATIA MARCESCENS BY PCR: NOT DETECTED
SODIUM BLD-SCNC: 143 MMOL/L (ref 132–146)
SOURCE OF BLOOD CULTURE: ABNORMAL
STAPHYLOCOCCUS AUREUS BY PCR: NOT DETECTED
STAPHYLOCOCCUS SPECIES BY PCR: DETECTED
STREPTOCOCCUS AGALACTIAE BY PCR: NOT DETECTED
STREPTOCOCCUS PNEUMONIAE BY PCR: NOT DETECTED
STREPTOCOCCUS PYOGENES  BY PCR: NOT DETECTED
STREPTOCOCCUS SPECIES BY PCR: NOT DETECTED
WBC # BLD: 7.7 E9/L (ref 4.5–11.5)

## 2020-05-09 PROCEDURE — 2580000003 HC RX 258: Performed by: INTERNAL MEDICINE

## 2020-05-09 PROCEDURE — 85730 THROMBOPLASTIN TIME PARTIAL: CPT

## 2020-05-09 PROCEDURE — 85025 COMPLETE CBC W/AUTO DIFF WBC: CPT

## 2020-05-09 PROCEDURE — 6360000002 HC RX W HCPCS: Performed by: INTERNAL MEDICINE

## 2020-05-09 PROCEDURE — 6370000000 HC RX 637 (ALT 250 FOR IP): Performed by: SPECIALIST

## 2020-05-09 PROCEDURE — 71045 X-RAY EXAM CHEST 1 VIEW: CPT

## 2020-05-09 PROCEDURE — 2060000000 HC ICU INTERMEDIATE R&B

## 2020-05-09 PROCEDURE — 99233 SBSQ HOSP IP/OBS HIGH 50: CPT | Performed by: FAMILY MEDICINE

## 2020-05-09 PROCEDURE — C9113 INJ PANTOPRAZOLE SODIUM, VIA: HCPCS | Performed by: INTERNAL MEDICINE

## 2020-05-09 PROCEDURE — 80048 BASIC METABOLIC PNL TOTAL CA: CPT

## 2020-05-09 PROCEDURE — 6370000000 HC RX 637 (ALT 250 FOR IP): Performed by: INTERNAL MEDICINE

## 2020-05-09 PROCEDURE — 94660 CPAP INITIATION&MGMT: CPT

## 2020-05-09 PROCEDURE — 85610 PROTHROMBIN TIME: CPT

## 2020-05-09 PROCEDURE — 85384 FIBRINOGEN ACTIVITY: CPT

## 2020-05-09 PROCEDURE — 2500000003 HC RX 250 WO HCPCS: Performed by: SPECIALIST

## 2020-05-09 PROCEDURE — 36415 COLL VENOUS BLD VENIPUNCTURE: CPT

## 2020-05-09 RX ORDER — VASOPRESSIN 20 U/ML
INJECTION PARENTERAL
Status: DISPENSED
Start: 2020-05-09 | End: 2020-05-09

## 2020-05-09 RX ORDER — CLOTRIMAZOLE AND BETAMETHASONE DIPROPIONATE 10; .5 MG/ML; MG/ML
LOTION TOPICAL 2 TIMES DAILY
Status: DISCONTINUED | OUTPATIENT
Start: 2020-05-09 | End: 2020-05-11 | Stop reason: HOSPADM

## 2020-05-09 RX ADMIN — Medication 10 ML: at 08:52

## 2020-05-09 RX ADMIN — ENOXAPARIN SODIUM 60 MG: 40 INJECTION SUBCUTANEOUS at 21:34

## 2020-05-09 RX ADMIN — CLOTRIMAZOLE AND BETAMETHASONE DIPROPIONATE: 10; .5 LOTION TOPICAL at 21:35

## 2020-05-09 RX ADMIN — FUROSEMIDE 40 MG: 10 INJECTION, SOLUTION INTRAMUSCULAR; INTRAVENOUS at 08:52

## 2020-05-09 RX ADMIN — MUPIROCIN: 20 OINTMENT TOPICAL at 12:35

## 2020-05-09 RX ADMIN — Medication 10 ML: at 21:35

## 2020-05-09 RX ADMIN — PANTOPRAZOLE SODIUM 40 MG: 40 INJECTION, POWDER, FOR SOLUTION INTRAVENOUS at 08:52

## 2020-05-09 RX ADMIN — ALLOPURINOL 100 MG: 100 TABLET ORAL at 08:52

## 2020-05-09 RX ADMIN — MICONAZOLE NITRATE 15 G: 20 POWDER TOPICAL at 21:37

## 2020-05-09 RX ADMIN — MUPIROCIN: 20 OINTMENT TOPICAL at 21:35

## 2020-05-09 ASSESSMENT — PAIN SCALES - GENERAL
PAINLEVEL_OUTOF10: 0

## 2020-05-09 NOTE — CONSULTS
COLONOSCOPY FLX DX W/COLLJ SPEC WHEN PFRMD N/A 10/19/2018    COLONOSCOPY DIAGNOSTIC performed by Luh Rodriguez MD at [address] Right 06/29/2015    UPPER GASTROINTESTINAL ENDOSCOPY N/A 10/17/2018    EGD BIOPSY performed by Luh Rodriguez MD at [facility] ENDOSCOPY     Current Medications:   Scheduled Meds:   vasopressin        mupirocin   Nasal BID    miconazole  5 g/m2 Topical BID    clotrimazole-betamethasone   Topical BID    sodium chloride flush  10 mL Intravenous BID    furosemide  40 mg Intravenous Daily    allopurinol  100 mg Oral QAM    enoxaparin  60 mg Subcutaneous Daily     Continuous Infusions:  PRN Meds:acetaminophen, acetaminophen, meperidine    Allergies:  Patient has no known allergies. Social History:   Social History     Socioeconomic History    Marital status:      Spouse name: None    Number of children: 0    Years of education: 12    Highest education level: None   Occupational History    Occupation: disabled   Social Needs    Financial resource strain: None    Food insecurity     Worry: None     Inability: None    Transportation needs     Medical: None     Non-medical: None   Tobacco Use    Smoking status: Light Tobacco Smoker     Years: 30.00    Smokeless tobacco: Former User    Tobacco comment: has not smoked in a week, occassionally   Substance and Sexual Activity    Alcohol use: Yes     Comment: \"a lot\"', none since 06/20/2015.     Drug use: No    Sexual activity: Not Currently   Lifestyle    Physical activity     Days per week: None     Minutes per session: None    Stress: None   Relationships    Social connections     Talks on phone: None     Gets together: None     Attends Ten[employer] service: None     Active member of club or organization: None     Attends meetings of clubs or organizations: None     Relationship status: None    Intimate partner violence     Fear of current or ex partner: None     Emotionally abused: None     Physically abused: None     Forced sexual activity: None   Other Topics Concern    None   Social History Narrative    None     Tobacco: No  Alcohol: +  Pets: No  Travel: No    Family History:       Problem Relation Age of Onset    Other Mother     Other Father     Other Brother    . Otherwise non-pertinent to the chief complaint. REVIEW OF SYSTEMS:    CONSTITUTIONAL:  + chills, fevers or night sweats. No loss of weight. EYES:  No double vision or drainage from eyes, ears or throat. HEENT:  No neck stiffness. No dysphagia. No drainage from eyes, ears or throat  RESPIRATORY:  No cough, productive sputum or hemoptysis. CARDIOVASCULAR:  No chest pain, palpitations, +orthopnea + dyspnea on exertion. GASTROINTESTINAL:  No nausea, vomiting, diarrhea or constipation or hematochezia   GENITOURINARY:  No frequency burning dysuria or hematuria. INTEGUMENT/BREAST:  No rash or breast masses. HEMATOLOGIC/LYMPHATIC:  No lymphadenopathy or blood dyscrasics. ALLERGIC/IMMUNOLOGIC:  No anaphylaxis. ENDOCRINE:  No polyuria or polydipsia or temperature intolerance. MUSCULOSKELETAL:  No myalgia or arthralgia. Full ROM. NEUROLOGICAL:  No focal motor sensory deficit. BEHAVIOR/PSYCH:  No psychosis. PHYSICAL EXAM:    Vitals:    /74   Pulse 92   Temp 98.6 °F (37 °C) (Temporal)   Resp 16   Ht 6' (1.829 m)   Wt (!) 400 lb (181.4 kg)   SpO2 94%   BMI 54.25 kg/m²   Constitutional: The patient is awake, alert, and oriented. Skin: Warm and dry. No rashes were noted. No jaundice. Patient has severe intertriginous candidiasis in the abdominal folds and on his right dorsum foot eczematous changes    HEENT: Eyes show round, and reactive pupils. Moist mucous membranes, no ulcerations, no thrush. Neck: Supple to movements. No lymphadenopathy. Chest: No use of accessory muscles to breathe. Symmetrical expansion. Auscultation reveals no wheezing, crackles, or rhonchi.    Cardiovascular: S1 Presumptive Negative -suggesting no recent or current infections  with Legionella pneumophila serogroup 1. Infection to Legionella cannot be ruled out since other serogroups  and species may cause infection, antigen may not be present in  early infection, or level of antigen may be below the  detection limit. Normal Range: Presumptive Negative       No results for input(s): ASO in the last 72 hours. No results for input(s): CULTRESP in the last 72 hours. Assessment:  · Hypercapnic respiratory failure required intubation at this time patient is extubated and stable  · Through the work-up in the emergency room 2 blood cultures were drawn 2 days ago were both positive for coag negative staph is a contaminant; to prove otherwise besides my impression would be to get another blood culture peripherally however with the vancomycin being given that would be negative and that effort would be wasted. · Eczema of dorsum of right foot  · Severe tinea corporis and intertriginous yeast in the abdominal apron    Plan:    · Cont off antibiotics  · Topical antifungal's in the abdominal folds  · Lotrisone on the dorsum of the right foot for the eczema  · After a few days when the Vanco was leached from the system if there was some concern about the significance of those bloods further blood cultures could be obtained off of antibiotics however knowing that were drawn in the emergency room and exactly the same time 2 days ago I would be very very surprised if these were other than contaminant  · Check cultures  · Baseline ESR, CRP  · Monitor labs  · Will follow with you    Thank you for having us see this patient in consultation. I will be discussing this case with the treating physicians.       Electronically signed by Arnel Morrison MD on 5/9/2020 at 2:46 PM

## 2020-05-09 NOTE — CONSULTS
No  · Employment:  no silica or asbestos exposure  · Family:  No family history of lung disease    Medications:     vasopressin        mupirocin   Nasal BID    vancomycin  2,500 mg Intravenous Once    sodium chloride flush  10 mL Intravenous BID    furosemide  40 mg Intravenous Daily    allopurinol  100 mg Oral QAM    enoxaparin  60 mg Subcutaneous Daily       Vitals:  Tmax:  VITALS:  /74   Pulse 92   Temp 98.6 °F (37 °C) (Temporal)   Resp 16   Ht 6' (1.829 m)   Wt (!) 400 lb (181.4 kg)   SpO2 94%   BMI 54.25 kg/m²   24HR INTAKE/OUTPUT:      Intake/Output Summary (Last 24 hours) at 2020 1339  Last data filed at 2020 1227  Gross per 24 hour   Intake 990 ml   Output 5025 ml   Net -4035 ml     CURRENT PULSE OXIMETRY:  SpO2: 94 %  24HR PULSE OXIMETRY RANGE:  SpO2  Av.1 %  Min: 92 %  Max: 96 %    EXAM:  General: No distress. Alert. Eyes: PERRL. No sclera icterus. No conjunctival injection. ENT: No discharge. Pharynx clear. Mallampati 4 anatomy  Neck: Trachea midline. Normal thyroid. No jvd, no hjr. Resp: No wheezing. No accessory muscle use. No rales. No rhonchi. CV: Regular rate. Regular rhythm. No murmur No rub. Abd: Non-tender. Non-distended. No masses. No organmegaly. Normal bowel sounds. Morbidly obese  Skin: Warm and dry. No nodule on exposed extremities. Eczematous rash on the dorsum of the feet right greater than left  Lymph: No cervical LAD. No supraclavicular LAD. Ext: No joint deformity. No clubbing. No cyanosis. No edema  Neuro: Awake. Follows commands. Positive pupils/gag/corneals. Normal pain response.      Lab Results:  CBC:   Recent Labs     20  0520  0500   WBC 8.9 10.3 7.7   HGB 15.5 14.7 14.2   HCT 46.8 45.7 44.3   MCV 99.4 101.8* 102.1*   * 101* 105*       BMP:  Recent Labs     20  0525 20  0500    137 143   K 4.4 5.2* 3.8   CL 99 101 101   CO2 28 26 32*   BUN 17 20 13   CREATININE 1.0 edema: Appears resolved      Plan:  1. Trial of nocturnal BiPAP with follow-up blood gases. If this does not work, the patient will need noninvasive mechanical ventilation. 2. Await infectious disease opinion      Thanks for letting us see this patient in consultation. Please contact us with any questions. Office [phone] or after hours through [employer], x [phone]. Please note that voice recognition technology was used in the preparation of this note and make therefore it may contain inadvertent transcription errors. If the patient is a COVID 19 isolation patient, the above physical exam reflects that of the examining physician for the day. Jose L Leon M.D., F.C.C.P.     [facility], [address] [facility], [address] [place] [address]

## 2020-05-09 NOTE — PROGRESS NOTES
Critical Care Team - Daily Progress Note         Date and time: 2020 12:07 PM  Patient's name:  Caitlyn Ivan  Medical Record Number: [id number]  Patient's account/billing number: [de-identified]  Patient's YOB: 1955  Age: 72 y.o. Date of Admission: 2020  5:01 PM  Length of stay during current admission: 2      Primary Care Physician: Luis Antonio Stafford MD  ICU Attending Physician: Dr. Lola Basurto    Code Status: Prior    Reason for ICU admission: acute respiratory failure with hypoxia/hypercapnia requiring intubation       SUBJECTIVE:     OVERNIGHT EVENTS:       20 extubated on . Wore bipap overnight- did well. No acute events.        CURRENT VENTILATION STATUS:     [] Ventilator  [x] BIPAP  [x] Nasal Cannula [] Room Air      IF INTUBATED, ET TUBE MARKING AT LOWER LIP:       cms    SECRETIONS Amount:  [] Small [] Moderate  [] Large  [x] None  Color:     [] White [] Colored  [] Bloody    SEDATION:  RAAS Score:  [] Propofol gtt  [] Versed gtt  [] Ativan gtt   [x] No Sedation    PARALYZED:  [x] No    [] Yes      VASOPRESSORS:  [x] No    [] Yes    If yes -   [] Levophed       [] Dopamine     [] Vasopressin       [] Dobutamine  [] Phenylephrine         [] Epinephrine    CENTRAL LINES:     [] No   [] Yes   (Date of Insertion:  20 )           If yes -     [x] Right IJ     [] Left IJ [] Right Femoral [] Left Femoral                   [] Right Subclavian [] Left Subclavian       DEL TORO'S CATHETER:   [] No   [x] Yes  (Date of Insertion: 20 )     URINE OUTPUT:            [x] Good   [] Low              [] Anuric      OBJECTIVE:     VITAL SIGNS:  /64   Pulse 89   Temp 97.8 °F (36.6 °C) (Oral)   Resp 28   Ht 6' (1.829 m)   Wt (!) 400 lb (181.4 kg)   SpO2 94%   BMI 54.25 kg/m²   Tmax over 24 hours:  Temp (24hrs), Av.6 °F (36.4 °C), Min:97.1 °F (36.2 °C), Max:97.9 °F (36.6 °C)      Patient Vitals for the past 6 hrs:   BP Temp Temp src Pulse Resp SpO2   20 1100 124/64 -- -- PRN          VENT SETTINGS (Comprehensive) (if applicable):  Vent Information  $Ventilation: $Subsequent Day  Skin Assessment: Clean, dry, & intact  Vent Type: 840  Vent Mode: (S) PS  Vt Ordered: 500 mL  Rate Set: 0 bmp  Peak Flow: 65 L/min  Pressure Support: 8 cmH20  FiO2 : 35 %  SpO2: 94 %  SpO2/FiO2 ratio: 235  Sensitivity: 2  PEEP/CPAP: 10  I Time/ I Time %: 0 s  Humidification Source: HME  Additional Respiratory  Assessments  Pulse: 89  Resp: 28  SpO2: 94 %  Position: Semi-Gipson's  Humidification Source: HME  Oral Care: Suction toothette  Subglottic Suction Done?: Yes  Cuff Pressure (cm H2O): 29 cm H2O    ABGs:   Recent Labs     05/08/20  0559   PH 7.307*   PCO2 47.3*   PO2 162.6*   HCO3 23.1   BE -3.5*   O2SAT 99.1*       Laboratory findings:    Complete Blood Count:   Recent Labs     05/07/20 1723 05/08/20  0525 05/09/20  0500   WBC 8.9 10.3 7.7   HGB 15.5 14.7 14.2   HCT 46.8 45.7 44.3   * 101* 105*        Last 3 Blood Glucose:   Recent Labs     05/07/20 1723 05/08/20  0525 05/09/20  0500   GLUCOSE 125* 153* 114*        PT/INR:    Lab Results   Component Value Date    PROTIME 12.3 05/09/2020    INR 1.0 05/09/2020     PTT:    Lab Results   Component Value Date    APTT 27.3 05/09/2020       Comprehensive Metabolic Profile:   Recent Labs     05/07/20 1723 05/08/20  0525 05/09/20  0500    137 143   K 4.4 5.2* 3.8   CL 99 101 101   CO2 28 26 32*   BUN 17 20 13   CREATININE 1.0 1.0 0.8   GLUCOSE 125* 153* 114*   CALCIUM 8.7 7.6* 8.3*   PROT  --  6.0*  --    LABALBU  --  3.2*  --    BILITOT  --  0.4  --    ALKPHOS  --  64  --    AST  --  72*  --    ALT  --  69*  --       Magnesium: No results found for: MG  Phosphorus: No results found for: PHOS  Ionized Calcium: No results found for: CAION     Urinalysis:     Troponin:   Recent Labs     05/07/20  1723 05/08/20  0525 05/08/20  1035   TROPONINI 0.02 0.06* 0.04*       Microbiology:    Cultures during this admission:     Blood cultures:

## 2020-05-09 NOTE — PROGRESS NOTES
Date: 5/9/2020    Time: 8:40 AM    Patient Placed On BIPAP/CPAP/ Non-Invasive Ventilation? No    If no must comment. Facial area red/color change? No           If YES are Blister/Lesion present? No   If yes must notify nursing staff  BIPAP/CPAP skin barrier? Yes    Skin barrier type:duoderm       Comments:patient taken off bipap and placed on a 4 liter nc.          Scarleta Arrow

## 2020-05-09 NOTE — PLAN OF CARE
Problem: Falls - Risk of:  Goal: Will remain free from falls  Description: Will remain free from falls  Outcome: Met This Shift  Goal: Absence of physical injury  Description: Absence of physical injury  Outcome: Met This Shift     Problem: Airway Clearance - Ineffective:  Goal: Ability to maintain a clear airway will improve  Description: Ability to maintain a clear airway will improve  Outcome: Met This Shift     Problem: Anxiety/Stress:  Goal: Level of anxiety will decrease  Description: Level of anxiety will decrease  Outcome: Met This Shift     Problem: Aspiration:  Goal: Absence of aspiration  Description: Absence of aspiration  Outcome: Met This Shift     Problem: Cardiac Output - Decreased:  Goal: Hemodynamic stability will improve  Description: Hemodynamic stability will improve  Outcome: Met This Shift     Problem: Fluid Volume - Imbalance:  Goal: Absence of imbalanced fluid volume signs and symptoms  Description: Absence of imbalanced fluid volume signs and symptoms  Outcome: Met This Shift     Problem: Gas Exchange - Impaired:  Goal: Levels of oxygenation will improve  Description: Levels of oxygenation will improve  Outcome: Met This Shift     Problem: Pain:  Goal: Pain level will decrease  Description: Pain level will decrease  Outcome: Met This Shift     Problem: Skin Integrity - Impaired:  Goal: Absence of new skin breakdown  Description: Absence of new skin breakdown  Outcome: Met This Shift
Problem: Pain:  Goal: Pain level will decrease  Description: Pain level will decrease  5/8/2020 0949 by Butch Moe RN  Outcome: Met This Shift  5/8/2020 0552 by Franklin Villavicencio RN  Outcome: Met This Shift     Problem: Skin Integrity - Impaired:  Goal: Absence of new skin breakdown  Description: Absence of new skin breakdown  5/8/2020 0949 by Butch Moe RN  Outcome: Met This Shift  5/8/2020 0552 by Franklin Villavicencio RN  Outcome: Met This Shift     Problem: Tissue Perfusion, Altered:  Goal: Circulatory function within specified parameters  Description: Circulatory function within specified parameters  5/8/2020 0949 by Butch Moe RN  Outcome: Met This Shift  5/8/2020 0552 by Franklin Villavicencio RN  Outcome: Met This Shift     Problem: Restraint Use - Nonviolent/Non-Self-Destructive Behavior:  Goal: Absence of restraint indications  Description: Absence of restraint indications  5/8/2020 0949 by Butch Moe RN  Outcome: Not Met This Shift  5/8/2020 0550 by Franklin Villavicencio RN  Outcome: Not Met This Shift     Problem: Fluid Volume - Imbalance:  Goal: Absence of imbalanced fluid volume signs and symptoms  Description: Absence of imbalanced fluid volume signs and symptoms  Outcome: Not Met This Shift
Shift     Problem: Pain:  Goal: Pain level will decrease  Description: Pain level will decrease  5/9/2020 0955 by Leeann Coon RN  Outcome: Met This Shift  5/9/2020 0559 by Yasmin Vallejo RN  Outcome: Met This Shift     Problem: Skin Integrity - Impaired:  Goal: Absence of new skin breakdown  Description: Absence of new skin breakdown  5/9/2020 0955 by Leeann Coon RN  Outcome: Met This Shift  5/9/2020 0559 by Yasmin Vallejo RN  Outcome: Met This Shift     Problem: Tissue Perfusion, Altered:  Goal: Circulatory function within specified parameters  Description: Circulatory function within specified parameters  Outcome: Met This Shift

## 2020-05-10 LAB
ANION GAP SERPL CALCULATED.3IONS-SCNC: 11 MMOL/L (ref 7–16)
APTT: 28.7 SEC (ref 24.5–35.1)
B.E.: 6.8 MMOL/L (ref -3–3)
BASOPHILS ABSOLUTE: 0.02 E9/L (ref 0–0.2)
BASOPHILS RELATIVE PERCENT: 0.3 % (ref 0–2)
BUN BLDV-MCNC: 14 MG/DL (ref 8–23)
CALCIUM SERPL-MCNC: 8.7 MG/DL (ref 8.6–10.2)
CHLORIDE BLD-SCNC: 97 MMOL/L (ref 98–107)
CO2: 31 MMOL/L (ref 22–29)
COHB: 1.6 % (ref 0–1.5)
CREAT SERPL-MCNC: 0.8 MG/DL (ref 0.7–1.2)
CRITICAL: ABNORMAL
DATE ANALYZED: ABNORMAL
DATE OF COLLECTION: ABNORMAL
EOSINOPHILS ABSOLUTE: 0.35 E9/L (ref 0.05–0.5)
EOSINOPHILS RELATIVE PERCENT: 4.9 % (ref 0–6)
FIBRINOGEN: 623 MG/DL (ref 225–540)
GFR AFRICAN AMERICAN: >60
GFR NON-AFRICAN AMERICAN: >60 ML/MIN/1.73
GLUCOSE BLD-MCNC: 124 MG/DL (ref 74–99)
HCO3: 32.1 MMOL/L (ref 22–26)
HCT VFR BLD CALC: 44.6 % (ref 37–54)
HEMOGLOBIN: 14.5 G/DL (ref 12.5–16.5)
HHB: 4.5 % (ref 0–5)
IMMATURE GRANULOCYTES #: 0.06 E9/L
IMMATURE GRANULOCYTES %: 0.8 % (ref 0–5)
INR BLD: 1
LAB: ABNORMAL
LYMPHOCYTES ABSOLUTE: 1.63 E9/L (ref 1.5–4)
LYMPHOCYTES RELATIVE PERCENT: 22.8 % (ref 20–42)
Lab: ABNORMAL
MCH RBC QN AUTO: 32.6 PG (ref 26–35)
MCHC RBC AUTO-ENTMCNC: 32.5 % (ref 32–34.5)
MCV RBC AUTO: 100.2 FL (ref 80–99.9)
METHB: 0.4 % (ref 0–1.5)
MODE: ABNORMAL
MONOCYTES ABSOLUTE: 0.53 E9/L (ref 0.1–0.95)
MONOCYTES RELATIVE PERCENT: 7.4 % (ref 2–12)
NEUTROPHILS ABSOLUTE: 4.55 E9/L (ref 1.8–7.3)
NEUTROPHILS RELATIVE PERCENT: 63.8 % (ref 43–80)
O2 CONTENT: 20.6 ML/DL
O2 SATURATION: 95.4 % (ref 92–98.5)
O2HB: 93.5 % (ref 94–97)
OPERATOR ID: 166
PATIENT TEMP: 37 C
PCO2: 47.6 MMHG (ref 35–45)
PDW BLD-RTO: 13.8 FL (ref 11.5–15)
PH BLOOD GAS: 7.45 (ref 7.35–7.45)
PLATELET # BLD: 102 E9/L (ref 130–450)
PMV BLD AUTO: 10.4 FL (ref 7–12)
PO2: 77.9 MMHG (ref 75–100)
POTASSIUM SERPL-SCNC: 3.4 MMOL/L (ref 3.5–5)
PROTHROMBIN TIME: 12.4 SEC (ref 9.3–12.4)
RBC # BLD: 4.45 E12/L (ref 3.8–5.8)
SODIUM BLD-SCNC: 139 MMOL/L (ref 132–146)
SOURCE, BLOOD GAS: ABNORMAL
THB: 15.7 G/DL (ref 11.5–16.5)
TIME ANALYZED: 1315
URINE CULTURE, ROUTINE: NORMAL
WBC # BLD: 7.1 E9/L (ref 4.5–11.5)

## 2020-05-10 PROCEDURE — 2580000003 HC RX 258: Performed by: INTERNAL MEDICINE

## 2020-05-10 PROCEDURE — 85025 COMPLETE CBC W/AUTO DIFF WBC: CPT

## 2020-05-10 PROCEDURE — 85384 FIBRINOGEN ACTIVITY: CPT

## 2020-05-10 PROCEDURE — 80048 BASIC METABOLIC PNL TOTAL CA: CPT

## 2020-05-10 PROCEDURE — 82805 BLOOD GASES W/O2 SATURATION: CPT

## 2020-05-10 PROCEDURE — 6360000002 HC RX W HCPCS: Performed by: INTERNAL MEDICINE

## 2020-05-10 PROCEDURE — 99232 SBSQ HOSP IP/OBS MODERATE 35: CPT | Performed by: FAMILY MEDICINE

## 2020-05-10 PROCEDURE — 85730 THROMBOPLASTIN TIME PARTIAL: CPT

## 2020-05-10 PROCEDURE — 85610 PROTHROMBIN TIME: CPT

## 2020-05-10 PROCEDURE — 2700000000 HC OXYGEN THERAPY PER DAY

## 2020-05-10 PROCEDURE — 2060000000 HC ICU INTERMEDIATE R&B

## 2020-05-10 PROCEDURE — 6370000000 HC RX 637 (ALT 250 FOR IP): Performed by: INTERNAL MEDICINE

## 2020-05-10 PROCEDURE — 36415 COLL VENOUS BLD VENIPUNCTURE: CPT

## 2020-05-10 PROCEDURE — 94660 CPAP INITIATION&MGMT: CPT

## 2020-05-10 PROCEDURE — 6370000000 HC RX 637 (ALT 250 FOR IP): Performed by: FAMILY MEDICINE

## 2020-05-10 RX ORDER — POTASSIUM CHLORIDE 20 MEQ/1
20 TABLET, EXTENDED RELEASE ORAL ONCE
Status: COMPLETED | OUTPATIENT
Start: 2020-05-10 | End: 2020-05-10

## 2020-05-10 RX ADMIN — MICONAZOLE NITRATE 15 G: 20 POWDER TOPICAL at 21:05

## 2020-05-10 RX ADMIN — MICONAZOLE NITRATE 15 G: 20 POWDER TOPICAL at 07:52

## 2020-05-10 RX ADMIN — ENOXAPARIN SODIUM 60 MG: 40 INJECTION SUBCUTANEOUS at 21:05

## 2020-05-10 RX ADMIN — CLOTRIMAZOLE AND BETAMETHASONE DIPROPIONATE: 10; .5 LOTION TOPICAL at 21:06

## 2020-05-10 RX ADMIN — FUROSEMIDE 40 MG: 10 INJECTION, SOLUTION INTRAMUSCULAR; INTRAVENOUS at 07:52

## 2020-05-10 RX ADMIN — MUPIROCIN: 20 OINTMENT TOPICAL at 07:52

## 2020-05-10 RX ADMIN — MUPIROCIN: 20 OINTMENT TOPICAL at 21:06

## 2020-05-10 RX ADMIN — Medication 10 ML: at 07:52

## 2020-05-10 RX ADMIN — Medication 10 ML: at 21:06

## 2020-05-10 RX ADMIN — POTASSIUM CHLORIDE 20 MEQ: 20 TABLET, EXTENDED RELEASE ORAL at 17:37

## 2020-05-10 RX ADMIN — ALLOPURINOL 100 MG: 100 TABLET ORAL at 07:52

## 2020-05-10 RX ADMIN — CLOTRIMAZOLE AND BETAMETHASONE DIPROPIONATE: 10; .5 LOTION TOPICAL at 07:52

## 2020-05-10 ASSESSMENT — PAIN SCALES - GENERAL
PAINLEVEL_OUTOF10: 0

## 2020-05-10 NOTE — PROGRESS NOTES
Date: 5/9/2020    Time: 11:08 PM    Patient Placed On BIPAP/CPAP/ Non-Invasive Ventilation? Yes    If no must comment. Facial area red/color change? No           If YES are Blister/Lesion present? No   If yes must notify nursing staff  BIPAP/CPAP skin barrier?   Yes    Skin barrier type:duoderm       Comments:        Melanie Hahn

## 2020-05-10 NOTE — PROGRESS NOTES
[facility] Progress Note    Admitting Date and Time: 5/7/2020  5:01 PM  Admit Dx: Acute respiratory failure with hypoxia ([facility] [J96.01]    Subjective:  Patient is being followed for Acute respiratory failure with hypoxia ([facility] [J96.01]   Admitted to ICU on 5/7/20 with worsening BERNAL and hypoxia, requiring intubation. Initial COVID testing negative. Patient had non-productive cough, chest tightness, and conversational dyspnea. Hypoxic in ER. Also has history of DM2, Tobacco use with ? COPD, CHAITANYA, morbid obesity and gout. Pt did well with extubation. Denied any pain. Using CPAP as he does at home. Patient had seen Dr. Kael Lucio in the past.  He was consulted and recommend nocturnal BIPAP. ID also following. Adding treatment for fungal infection of the abdominal apron and feet. No complaints this morning. Appetite is fair. Denies pain. Shortness of breath significantly improved. Per RN: afebrile, respirations 25, BP mildly hypotensive, O2 94% 5L NC    ROS: no fevers, chills, cp, sob, n/v, ha, vision/hearing changes, wt changes, hot/cold flashes, other open skin lesions, diarrhea, constipation, dysuria/hematuria unless noted in HPI.       mupirocin   Nasal BID    miconazole  5 g/m2 Topical BID    clotrimazole-betamethasone   Topical BID    sodium chloride flush  10 mL Intravenous BID    furosemide  40 mg Intravenous Daily    allopurinol  100 mg Oral QAM    enoxaparin  60 mg Subcutaneous Daily     acetaminophen, 500 mg, Q4H PRN  acetaminophen, 650 mg, Q4H PRN  meperidine, 25 mg, Q3H PRN         Objective:    /72   Pulse 85   Temp 98.7 °F (37.1 °C) (Oral)   Resp 18   Ht 6' (1.829 m)   Wt (!) 407 lb 3.2 oz (184.7 kg)   SpO2 94%   BMI 55.23 kg/m²     General Appearance: intubated but alert  Skin: warm and dry, severe eczema of the feet, intertriginous yeast of the abdomen  Head: normocephalic and atraumatic  Eyes: pupils equal, round, and reactive to light, extraocular

## 2020-05-10 NOTE — PROGRESS NOTES
[address] [place] [address] [facility]  Progress Note      Chief Complaint   Patient presents with    Shortness of Breath     SOB and cough x 3 days- no fever     Cough       SUBJECTIVE:  Patient is tolerating medications. No reported adverse drug reactions. No nausea, vomiting, diarrhea. Patient is awake and alert  No complaints especially when interrogated about that right dorsal foot. He says it is itchy at times but no pain  Patient's been afebrile throughout the admission      Review of systems:  As stated above in the chief complaint, otherwise negative. Medications:  Scheduled Meds:   mupirocin   Nasal BID    miconazole  5 g/m2 Topical BID    clotrimazole-betamethasone   Topical BID    sodium chloride flush  10 mL Intravenous BID    furosemide  40 mg Intravenous Daily    allopurinol  100 mg Oral QAM    enoxaparin  60 mg Subcutaneous Daily     Continuous Infusions:  PRN Meds:acetaminophen, acetaminophen, meperidine    OBJECTIVE:  /72   Pulse 85   Temp 98.7 °F (37.1 °C) (Oral)   Resp 18   Ht 6' (1.829 m)   Wt (!) 407 lb 3.2 oz (184.7 kg)   SpO2 94%   BMI 55.23 kg/m²   Temp  Av.3 °F (36.8 °C)  Min: 97.8 °F (36.6 °C)  Max: 98.7 °F (37.1 °C)  Constitutional: The patient is awake, alert, and oriented. Skin: Warm and dry. No rashes were noted. Intertriginous candidiasis in the abdominal apron; eczema on the dorsum of the right foot and left elbow  HEENT: Round and reactive pupils. Moist mucous membranes. No ulcerations or thrush. Neck: Supple to movements. Chest: No use of accessory muscles to breathe. Symmetrical expansion. No wheezing, crackles or rhonchi. Cardiovascular: S1 and S2 are rhythmic and regular. No murmurs appreciated. Abdomen: Positive bowel sounds to auscultation. Benign to palpation. No masses felt. No hepatosplenomegaly. Genitourinary: Male  Extremities: No clubbing, no cyanosis, no edema.   Lines: peripheral    Laboratory and Tests found for: Carmita Ying, LABLEGI, AFBCX, FUNGSM, LABFUNG  No results found for: CULTRESP  No results found for: CXCATHTIP  No results found for: BFCS  No results found for: CXSURG  Urine Culture, Routine   Date Value Ref Range Status   05/07/2020 Growth not present  Final     MRSA Culture Only   Date Value Ref Range Status   06/25/2015 Methicillin resistant Staph aureus not isolated  Final   12/04/2014 Methicillin resistant Staph aureus not isolated  Final       ASSESSMENT:  · Patient had acute respiratory failure associated with hypercapnia hypoxemia. · Intertriginous candidiasis the abdominal apron  · Eczema the right dorsal foot left elbow  · 2 blood cultures drawn at the same time on 7 May at the time of admission were positive for coag negative staph on 5/ 9/ 20 which was 48 hours later.     PLAN:  · Continue local care to the intertriginous areas and the eczema  · Check final cultures  · Monitor labs    Corinne Jefferson  9:53 AM  5/10/2020

## 2020-05-10 NOTE — PROGRESS NOTES
Pulmonary Progress Note    Admit Date: 2020  Hospital day                               PCP: Sherice Bird MD    Chief Complaint (s):  Patient Active Problem List   Diagnosis    Primary osteoarthritis of left hip    Primary osteoarthritis of left hip    Instability of right hip joint    Diverticulosis    Acute blood loss anemia    Diabetes mellitus type 2, controlled ([facility]    Acute respiratory failure with hypoxia ([facility]    Viral pneumonia    Morbid obesity with BMI of 50.0-59.9, adult ([facility]    Bacteremia       Subjective:  · Did not sleep well with BiPAP      Vitals:  VITALS:  /72   Pulse 85   Temp 98.7 °F (37.1 °C) (Oral)   Resp 18   Ht 6' (1.829 m)   Wt (!) 407 lb 3.2 oz (184.7 kg)   SpO2 94%   BMI 55.23 kg/m²     24HR INTAKE/OUTPUT:      Intake/Output Summary (Last 24 hours) at 5/10/2020 1235  Last data filed at 5/10/2020 1223  Gross per 24 hour   Intake 730 ml   Output 4850 ml   Net -4120 ml       24HR PULSE OXIMETRY RANGE:    SpO2  Av.5 %  Min: 94 %  Max: 99 %    Medications:  IV:      Scheduled Meds:   mupirocin   Nasal BID    miconazole  5 g/m2 Topical BID    clotrimazole-betamethasone   Topical BID    sodium chloride flush  10 mL Intravenous BID    furosemide  40 mg Intravenous Daily    allopurinol  100 mg Oral QAM    enoxaparin  60 mg Subcutaneous Daily       Diet:   DIET CARB CONTROL; Carb Control: 4 carbs/meal (approximate 1800 kcals/day)     EXAM:  General: No distress. Alert. Eyes: PERRL. No sclera icterus. No conjunctival injection. ENT: No discharge. Pharynx clear. Neck: Trachea midline. Normal thyroid. Resp: No accessory muscle use. No rales. No wheezing. No rhonchi. CV: Regular rate. Regular rhythm. No murmur or rub. Abd: Non-tender. Non-distended. No masses. No organomegaly. Normal bowel sounds. Morbidly obese  Skin: Warm and dry. No nodule on exposed extremities. No rash on exposed extremities.   Eczema right greater than left of

## 2020-05-11 VITALS
SYSTOLIC BLOOD PRESSURE: 116 MMHG | OXYGEN SATURATION: 97 % | HEART RATE: 60 BPM | HEIGHT: 72 IN | DIASTOLIC BLOOD PRESSURE: 64 MMHG | BODY MASS INDEX: 42.66 KG/M2 | WEIGHT: 315 LBS | RESPIRATION RATE: 18 BRPM | TEMPERATURE: 98.2 F

## 2020-05-11 LAB
ALBUMIN SERPL-MCNC: 3.4 G/DL (ref 3.5–5.2)
ALP BLD-CCNC: 64 U/L (ref 40–129)
ALT SERPL-CCNC: 32 U/L (ref 0–40)
ANION GAP SERPL CALCULATED.3IONS-SCNC: 10 MMOL/L (ref 7–16)
APTT: 27.7 SEC (ref 24.5–35.1)
AST SERPL-CCNC: 30 U/L (ref 0–39)
BASOPHILS ABSOLUTE: 0.05 E9/L (ref 0–0.2)
BASOPHILS RELATIVE PERCENT: 0.6 % (ref 0–2)
BILIRUB SERPL-MCNC: 0.9 MG/DL (ref 0–1.2)
BUN BLDV-MCNC: 14 MG/DL (ref 8–23)
CALCIUM SERPL-MCNC: 8.7 MG/DL (ref 8.6–10.2)
CHLORIDE BLD-SCNC: 95 MMOL/L (ref 98–107)
CO2: 31 MMOL/L (ref 22–29)
CREAT SERPL-MCNC: 0.8 MG/DL (ref 0.7–1.2)
EOSINOPHILS ABSOLUTE: 0.4 E9/L (ref 0.05–0.5)
EOSINOPHILS RELATIVE PERCENT: 5 % (ref 0–6)
FIBRINOGEN: 596 MG/DL (ref 225–540)
GFR AFRICAN AMERICAN: >60
GFR NON-AFRICAN AMERICAN: >60 ML/MIN/1.73
GLUCOSE BLD-MCNC: 129 MG/DL (ref 74–99)
HCT VFR BLD CALC: 43.9 % (ref 37–54)
HEMOGLOBIN: 14.7 G/DL (ref 12.5–16.5)
IMMATURE GRANULOCYTES #: 0.04 E9/L
IMMATURE GRANULOCYTES %: 0.5 % (ref 0–5)
INR BLD: 1
LYMPHOCYTES ABSOLUTE: 1.53 E9/L (ref 1.5–4)
LYMPHOCYTES RELATIVE PERCENT: 19.2 % (ref 20–42)
MCH RBC QN AUTO: 33 PG (ref 26–35)
MCHC RBC AUTO-ENTMCNC: 33.5 % (ref 32–34.5)
MCV RBC AUTO: 98.7 FL (ref 80–99.9)
MONOCYTES ABSOLUTE: 0.63 E9/L (ref 0.1–0.95)
MONOCYTES RELATIVE PERCENT: 7.9 % (ref 2–12)
NEUTROPHILS ABSOLUTE: 5.32 E9/L (ref 1.8–7.3)
NEUTROPHILS RELATIVE PERCENT: 66.8 % (ref 43–80)
PDW BLD-RTO: 13.6 FL (ref 11.5–15)
PLATELET # BLD: 110 E9/L (ref 130–450)
PMV BLD AUTO: 10.2 FL (ref 7–12)
POTASSIUM SERPL-SCNC: 3.6 MMOL/L (ref 3.5–5)
PROTHROMBIN TIME: 12 SEC (ref 9.3–12.4)
RBC # BLD: 4.45 E12/L (ref 3.8–5.8)
SODIUM BLD-SCNC: 136 MMOL/L (ref 132–146)
TOTAL PROTEIN: 6.2 G/DL (ref 6.4–8.3)
WBC # BLD: 8 E9/L (ref 4.5–11.5)

## 2020-05-11 PROCEDURE — 2700000000 HC OXYGEN THERAPY PER DAY

## 2020-05-11 PROCEDURE — 85025 COMPLETE CBC W/AUTO DIFF WBC: CPT

## 2020-05-11 PROCEDURE — 99239 HOSP IP/OBS DSCHRG MGMT >30: CPT | Performed by: INTERNAL MEDICINE

## 2020-05-11 PROCEDURE — 6370000000 HC RX 637 (ALT 250 FOR IP): Performed by: INTERNAL MEDICINE

## 2020-05-11 PROCEDURE — 85384 FIBRINOGEN ACTIVITY: CPT

## 2020-05-11 PROCEDURE — 6360000002 HC RX W HCPCS: Performed by: INTERNAL MEDICINE

## 2020-05-11 PROCEDURE — 85730 THROMBOPLASTIN TIME PARTIAL: CPT

## 2020-05-11 PROCEDURE — 94660 CPAP INITIATION&MGMT: CPT

## 2020-05-11 PROCEDURE — 85610 PROTHROMBIN TIME: CPT

## 2020-05-11 PROCEDURE — 80053 COMPREHEN METABOLIC PANEL: CPT

## 2020-05-11 PROCEDURE — 2580000003 HC RX 258: Performed by: INTERNAL MEDICINE

## 2020-05-11 PROCEDURE — 36415 COLL VENOUS BLD VENIPUNCTURE: CPT

## 2020-05-11 RX ADMIN — Medication 10 ML: at 08:14

## 2020-05-11 RX ADMIN — FUROSEMIDE 40 MG: 10 INJECTION, SOLUTION INTRAMUSCULAR; INTRAVENOUS at 08:13

## 2020-05-11 RX ADMIN — CLOTRIMAZOLE AND BETAMETHASONE DIPROPIONATE: 10; .5 LOTION TOPICAL at 08:14

## 2020-05-11 RX ADMIN — ALLOPURINOL 100 MG: 100 TABLET ORAL at 08:14

## 2020-05-11 RX ADMIN — MUPIROCIN: 20 OINTMENT TOPICAL at 08:14

## 2020-05-11 RX ADMIN — MICONAZOLE NITRATE 15 G: 20 POWDER TOPICAL at 08:14

## 2020-05-11 ASSESSMENT — PAIN SCALES - GENERAL
PAINLEVEL_OUTOF10: 0
PAINLEVEL_OUTOF10: 0

## 2020-05-11 NOTE — PROGRESS NOTES
[address] [place] [address] [facility]  Progress Note      Chief Complaint   Patient presents with    Shortness of Breath     SOB and cough x 3 days- no fever     Cough       SUBJECTIVE:  The patient has no new complaints today. He is tolerating current topical medications for tinea cruris. No nausea or vomiting. No fevers. Review of systems:  As stated above in the chief complaint, otherwise negative. Medications:  Scheduled Meds:   mupirocin   Nasal BID    miconazole  5 g/m2 Topical BID    clotrimazole-betamethasone   Topical BID    sodium chloride flush  10 mL Intravenous BID    furosemide  40 mg Intravenous Daily    allopurinol  100 mg Oral QAM    enoxaparin  60 mg Subcutaneous Daily     Continuous Infusions:  PRN Meds:acetaminophen, acetaminophen, meperidine    OBJECTIVE:  /77   Pulse 80   Temp 98 °F (36.7 °C) (Oral)   Resp 18   Ht 6' (1.829 m)   Wt (!) 402 lb 1.6 oz (182.4 kg)   SpO2 93%   BMI 54.53 kg/m²   Temp  Av °F (36.7 °C)  Min: 97.6 °F (36.4 °C)  Max: 98.4 °F (36.9 °C)  Constitutional: The patient is sitting up in bed. He is awake and in no distress. Skin: Warm and dry. Intertrigo on lower abdominal pannus and inguinal areas. HEENT: Round and reactive pupils. Moist mucous membranes. No ulcerations or thrush. Neck: Supple to movements. Chest: No use of accessory muscles to breathe. Symmetrical expansion. No wheezing, crackles or rhonchi. Cardiovascular: S1 and S2 are rhythmic and regular. No murmurs appreciated. Abdomen: Positive bowel sounds to auscultation. Benign to palpation. Extremities: No edema. Lines: Right IJ TLC.     Laboratory and Tests Review:  Lab Results   Component Value Date    WBC 8.0 2020    WBC 7.1 05/10/2020    WBC 7.7 2020    HGB 14.7 2020    HCT 43.9 2020    MCV 98.7 2020     (L) 2020     Lab Results   Component Value Date    NEUTROABS 5.32 2020    NEUTROABS 4.55

## 2020-05-11 NOTE — PROGRESS NOTES
[facility]) Hospitalist Progress Note    Admission Date  5/7/2020  5:01 PM  Chief Complaint SOB, cough  Admit Dx   Acute respiratory failure with hypoxia ([facility] [address] [J96.01]    Subjective  History of Present Illness  5/11/2020 This is Dr. Lea Anglin assuming care of this patient. Christal Urban is a 70M w PMH NIDDM, gout, sleep apnea on CPAP, morbid obesity, and arthritis who presented to the ED on 5/7/2020 with complaints of shortness of breath and cough and who was ultimately admitted for acute hypoxia requiring intubation initially, now extubated on nasal cannula. COVID testing negative. This morning, vitals stable on 2LNC, labs show some CO2 retention at 31 but otherwise unremarkable. Pt essentially feels back to baseline and reports conversation w Dr. Filemon Arroyo today who recommended switching from CPAP to BiPAP and also adding home O2. Pulm note reviewed. Pt stable for dc home once these have been arranged.     Review of Systems - 12-point review of systems has been reviewed and is otherwise negative except as listed in the HPI    Objective  Physical Exam  Vitals: /77   Pulse 80   Temp 98 °F (36.7 °C) (Oral)   Resp 18   Ht 6' (1.829 m)   Wt (!) 402 lb 1.6 oz (182.4 kg)   SpO2 93%   BMI 54.53 kg/m²   General: well-developed, well-nourished, no acute distress, cooperative  Skin: warm, dry, intact, normal color without cyanosis  HEENT: normocephalic, atraumatic, mucous membranes normal, NCo2 in place  Respiratory: clear to auscultation bilaterally without respiratory distress  Cardiovascular: regular rate and rhythm without murmur / rub / gallop  Abdominal: morbidly obese, soft, nontender, nondistended, normoactive bowel sounds  Extremities: no mottling, pulses intact, no edema  Neurologic: awake, alert, no focal deficits  Psychiatric: normal affect, cooperative    Labs  Recent Labs     05/09/20  0500 05/10/20  0450 05/11/20  0335    139 136   K 3.8 3.4* 3.6   CO2 32* 31* 31*   BUN 13 14 14

## 2020-05-11 NOTE — PROGRESS NOTES
Right IJ central venous catheter is present with distal tip at   location of superior vena cava. 2. Endotracheal tube is 2.5 cm above the terry. 3. New interstitial and hazy opacities are present bilaterally. CTA CHEST W CONTRAST   Final Result      1. No evidence of pulmonary embolism. 2. Findings suggest pulmonary vascular congestion with developing   interstitial pulmonary edema or bilateral bronchopneumonia. 3. Prominent and mildly enlarged mediastinal and perihilar lymph   nodes. XR CHEST PORTABLE   Final Result      No airspace opacities or pleural effusion. Assessment:  1. Hypercapnic respiratory failure: Likely a combination of obesity hypoventilation syndrome with underlying sleep apnea. Alcohol may be aggravating that. 2. Staph bacteremia: Unclear source. There are no open wounds  3. Pulmonary edema: Appears resolved        Plan:  1. Arrange nocturnal BiPAP at the current settings for home. Care reviewed with the staff and the patient's family as available. Please note that voice recognition technology was used in the preparation of this note and make therefore it may contain inadvertent transcription errors. If the patient is a COVID 19 isolation patient, the above physical exam reflects that of the examining physician for the day. Chong Chung M.D., F.C.C.P.     [facility], [address] [place] [address], [address], [facility]

## 2020-05-12 LAB
ORGANISM: ABNORMAL
ORGANISM: ABNORMAL

## 2020-05-12 NOTE — DISCHARGE SUMMARY
opacities or pleural effusion. Cta Chest W Contrast  Result Date: 5/7/2020  1. No evidence of pulmonary embolism. 2. Findings suggest pulmonary vascular congestion with developing interstitial pulmonary edema or bilateral bronchopneumonia. 3. Prominent and mildly enlarged mediastinal and perihilar lymph nodes. Xr Abdomen For Ng/og/ne Tube Placement  Result Date: 5/7/2020  As above.     Patient Instructions     Medication List      CONTINUE taking these medications    allopurinol 100 MG tablet  Commonly known as:  ZYLOPRIM     ferrous sulfate 325 (65 Fe) MG EC tablet  Commonly known as:  Fe Tabs  Take 1 tablet by mouth 2 times daily     fish oil 1000 MG Caps     metFORMIN 500 MG tablet  Commonly known as:  GLUCOPHAGE     pantoprazole 40 MG tablet  Commonly known as:  PROTONIX  Take 1 tablet by mouth 2 times daily (before meals)     vitamin B-12 500 MCG tablet  Commonly known as:  CYANOCOBALAMIN     vitamin D 1000 UNIT Tabs tablet  Commonly known as:  CHOLECALCIFEROL     vitamin E 400 UNIT capsule          Note that more than 30 minutes was spent in preparing discharge papers, discussing discharge with patient, medication review, etc.    Electronically signed by Jase Osorio DO on 5/12/2020 at 8:01 AM

## 2020-05-14 LAB
ORGANISM: ABNORMAL
ORGANISM: ABNORMAL

## 2020-05-18 LAB
Lab: NORMAL
REPORT: NORMAL
THIS TEST SENT TO: NORMAL

## 2020-06-03 ENCOUNTER — HOSPITAL ENCOUNTER (OUTPATIENT)
Dept: GENERAL RADIOLOGY | Age: 65
Discharge: HOME | End: 2020-06-05
Payer: MEDICARE

## 2020-06-03 ENCOUNTER — HOSPITAL ENCOUNTER (OUTPATIENT)
Age: 65
Discharge: HOME | End: 2020-06-05
Payer: MEDICARE

## 2020-06-03 ENCOUNTER — HOSPITAL ENCOUNTER (OUTPATIENT)
Age: 65
Discharge: HOME | End: 2020-06-03
Payer: MEDICARE

## 2020-06-03 LAB
B.E.: 1.2 MMOL/L (ref -3–3)
COHB: 0.9 % (ref 0–1.5)
CRITICAL: ABNORMAL
DATE ANALYZED: ABNORMAL
DATE OF COLLECTION: ABNORMAL
HCO3: 26.5 MMOL/L (ref 22–26)
HHB: 4.1 % (ref 0–5)
LAB: ABNORMAL
Lab: ABNORMAL
METHB: 0.3 % (ref 0–1.5)
MODE: ABNORMAL
O2 CONTENT: 20.4 ML/DL
O2 SATURATION: 95.9 % (ref 92–98.5)
O2HB: 94.7 % (ref 94–97)
OPERATOR ID: ABNORMAL
PATIENT TEMP: 37 C
PCO2: 44.5 MMHG (ref 35–45)
PH BLOOD GAS: 7.39 (ref 7.35–7.45)
PO2: 85.5 MMHG (ref 75–100)
SOURCE, BLOOD GAS: ABNORMAL
THB: 15.3 G/DL (ref 11.5–16.5)
TIME ANALYZED: 1320

## 2020-06-03 PROCEDURE — 82805 BLOOD GASES W/O2 SATURATION: CPT

## 2020-06-03 PROCEDURE — 36600 WITHDRAWAL OF ARTERIAL BLOOD: CPT

## 2020-06-03 PROCEDURE — 71046 X-RAY EXAM CHEST 2 VIEWS: CPT

## 2020-08-20 ENCOUNTER — HOSPITAL ENCOUNTER (OUTPATIENT)
Dept: SLEEP CENTER | Age: 65
Discharge: HOME | End: 2020-08-20
Payer: MEDICARE

## 2020-09-03 ENCOUNTER — HOSPITAL ENCOUNTER (OUTPATIENT)
Age: 65
Discharge: HOME | End: 2020-09-05
Payer: MEDICARE

## 2020-09-03 PROCEDURE — U0003 INFECTIOUS AGENT DETECTION BY NUCLEIC ACID (DNA OR RNA); SEVERE ACUTE RESPIRATORY SYNDROME CORONAVIRUS 2 (SARS-COV-2) (CORONAVIRUS DISEASE [COVID-19]), AMPLIFIED PROBE TECHNIQUE, MAKING USE OF HIGH THROUGHPUT TECHNOLOGIES AS DESCRIBED BY CMS-2020-01-R: HCPCS

## 2020-09-05 ENCOUNTER — HOSPITAL ENCOUNTER (OUTPATIENT)
Dept: SLEEP CENTER | Age: 65
Discharge: HOME | End: 2020-09-05
Payer: MEDICARE

## 2020-09-05 PROCEDURE — 95810 POLYSOM 6/> YRS 4/> PARAM: CPT

## 2020-09-05 PROCEDURE — 2700000000 HC OXYGEN THERAPY PER DAY

## 2020-09-06 VITALS
TEMPERATURE: 97.2 F | DIASTOLIC BLOOD PRESSURE: 99 MMHG | BODY MASS INDEX: 42.66 KG/M2 | SYSTOLIC BLOOD PRESSURE: 166 MMHG | WEIGHT: 315 LBS | HEART RATE: 81 BPM | OXYGEN SATURATION: 97 % | HEIGHT: 72 IN

## 2020-09-06 ASSESSMENT — SLEEP AND FATIGUE QUESTIONNAIRES
HOW LIKELY ARE YOU TO NOD OFF OR FALL ASLEEP WHILE SITTING AND READING: 1
HOW LIKELY ARE YOU TO NOD OFF OR FALL ASLEEP WHILE SITTING QUIETLY AFTER LUNCH WITHOUT ALCOHOL: 0
HOW LIKELY ARE YOU TO NOD OFF OR FALL ASLEEP WHEN YOU ARE A PASSENGER IN A CAR FOR AN HOUR WITHOUT A BREAK: 0
HOW LIKELY ARE YOU TO NOD OFF OR FALL ASLEEP WHILE SITTING INACTIVE IN A PUBLIC PLACE: 0
ESS TOTAL SCORE: 4
HOW LIKELY ARE YOU TO NOD OFF OR FALL ASLEEP WHILE LYING DOWN TO REST IN THE AFTERNOON WHEN CIRCUMSTANCES PERMIT: 1
HOW LIKELY ARE YOU TO NOD OFF OR FALL ASLEEP WHILE WATCHING TV: 2
HOW LIKELY ARE YOU TO NOD OFF OR FALL ASLEEP WHILE SITTING AND TALKING TO SOMEONE: 0
HOW LIKELY ARE YOU TO NOD OFF OR FALL ASLEEP IN A CAR, WHILE STOPPED FOR A FEW MINUTES IN TRAFFIC: 0

## 2020-09-09 LAB
SARS-COV-2: NOT DETECTED
SOURCE: NORMAL

## 2020-12-04 DIAGNOSIS — G47.33 OSA (OBSTRUCTIVE SLEEP APNEA): ICD-10-CM

## 2020-12-04 DIAGNOSIS — G47.33 OSA TREATED WITH BIPAP: ICD-10-CM

## 2020-12-06 LAB
SARS-COV-2: NOT DETECTED
SOURCE: NORMAL

## 2020-12-11 ENCOUNTER — HOSPITAL ENCOUNTER (OUTPATIENT)
Dept: SLEEP CENTER | Age: 65
Discharge: HOME | End: 2020-12-11
Payer: MEDICARE

## 2020-12-11 PROCEDURE — 95811 POLYSOM 6/>YRS CPAP 4/> PARM: CPT

## 2020-12-12 VITALS
HEIGHT: 72 IN | DIASTOLIC BLOOD PRESSURE: 99 MMHG | BODY MASS INDEX: 42.66 KG/M2 | TEMPERATURE: 97.7 F | HEART RATE: 91 BPM | WEIGHT: 315 LBS | SYSTOLIC BLOOD PRESSURE: 166 MMHG | OXYGEN SATURATION: 98 %

## 2021-01-21 NOTE — PROGRESS NOTES
D: 01/19/2021 22:23:16       T: 01/20/2021 8:36:28     [email or web address]  Job#: [id number]     Doc#: [id number]    CC:

## 2021-01-26 DIAGNOSIS — G47.33 OBSTRUCTIVE SLEEP APNEA (ADULT) (PEDIATRIC): Primary | ICD-10-CM

## 2021-09-28 ENCOUNTER — TELEPHONE (OUTPATIENT)
Dept: ADMINISTRATIVE | Age: 66
End: 2021-09-28

## 2021-11-01 NOTE — PROGRESS NOTES
[facility] and [facility] Electrophysiology  Consultation Report  PATIENT: Ever Greco  MEDICAL RECORD NUMBER: [id number]  DATE OF SERVICE:  11/3/2021  ATTENDING ELECTROPHYSIOLOGIST: Natalie Villagomez MD  REFERRING PHYSICIAN: Eleazar Hsu MD and Felton Lentz MD  CHIEF COMPLAINT: Persistent atrial fibrillation    HPI: This is a 77 y.o. male with a history of   Patient Active Problem List   Diagnosis    Primary osteoarthritis of left hip    Primary osteoarthritis of left hip    Instability of right hip joint    Diverticulosis    Acute blood loss anemia    Diabetes mellitus type 2, controlled ([address]    Acute respiratory failure with hypoxia ([address]    Viral pneumonia    Morbid obesity with BMI of 50.0-59.9, adult ([address]    Bacteremia    Transaminitis    CHAITANYA (obstructive sleep apnea)    Atelectasis    Chronic atrial fibrillation ([address]    Discharge planning issues    Disorder of aorta (HCC)    Dyspnea on exertion    Electrocardiogram abnormal    Aortic valve regurgitation    Morbid obesity ([address]    On home O2    Postoperative hypertension    Stomach ulcer    Tobacco user   who presents to cardiac electrophysiology clinic for consultation of persistent AF, s/p TAVR  5/26/21 ([facility]). His PMHx is noted below and includes: VHD: mod/severe AS-s/p TAVR; mild TR; trace MR; persistent AF on [facility] [place]; DM; CHAITANYA; morbid obesity; BERNAL. Current medications: amlodipine, apixaban, tenormin, lisinopril, metformin, allopurinol, ferrous sulfate, pantoprazole, and vitamins. He follows with Dr Gisela Jean Baptiste and [facility] for his history of VHD and underwent TAVR procedure on 5/26/21. Since the TAVR procedure he has been in AF on Eliquis. A TTE on  7/16/21 ([facility]) shows an LVEF 65%, severely dilated LA and dilated RA (see below/Care Everywhere). A cardiac MRI showed no evidence of scar. [facility] recommended an EP referral and the patient opted to follow locally.  The patient reports he did have syncopal episode after drinking at a bar, he does report consuming ETOH at that time. He presents today in AF with CVR. He reports he is unable to tell when he is in AF and remains asymptomatic. In terms of rhythm control we discussed about cardioversion with the addition of AAD therapy. Also we discussed about rate control strategy due to being asymptomatic. I don't believe AF catheter ablation would be a option due to obesity. The patient denies any chest pain, dyspnea, palpitations, dizziness, syncope, orthopnea or paroxysmal nocturnal dyspnea. Patient Active Problem List    Diagnosis Date Noted    Discharge planning issues 11/03/2021     Overview Note:     Formatting of this note might be different from the original.  Nicole Garcia is a 77year old male from [place], [place]    Anticipated Discharge Needs:  No anticipated DC needs and CM consult for placement      Stomach ulcer 11/03/2021     Overview Note:     Formatting of this note might be different from the original.  History: per EPIC, takes protonix 40mg BID at home  Assessment: no c/o epigastric discomfort  Plan: Continue pantoprazole 40mg BID      Postoperative hypertension 05/28/2021     Overview Note:     Formatting of this note might be different from the original.  History: Post op prob  Assessment: SBPs 120s-140s  Plan:  continue low dose norvasc, IV lasix. Consider starting BB in setting of afib if rate increases      Atelectasis 05/26/2021     Overview Note:     Formatting of this note might be different from the original.  History: postop  Assessment: diffuse atelectasis    Plan: PEP, up OOB to chair daily, cough and deep breathing      Chronic atrial fibrillation ([address] 05/25/2021     Overview Note:     Formatting of this note might be different from the original.  History: Pt reports irregular heart beat for at least 2 years. Reports never being told of afib and has not been on [facility] [place] [place] by [facility].  Preop ECG 4/8/2021 showed Afib  Assessment: 5/28 likely accelerated junctional per EPS-monitor for now. Currently  appears in afib on tele (HR 50s-70s), but rate controlled. Plan: continue eliquis  per CONTRERAS. Continue to monitor off BB as HR controlled. received pharm message on 6/2 re: Eliquis not recommended for pt's with weight greater than 160kg as this has led to concerns that fixed dosed AC regimens may lead to alerted clearance and/or decreased drug exposure in obese patients. Based on the concerns of duration of his afib (history of this pre op yet not on any AC) and elevated weight, Coumadin may be more beneficial; however, spoke with Dr. Doretha Price who agreed to continue Eliquis at this time as this would be more beneficial then monotherapy with just ASA.       On home O2 05/25/2021    Transaminitis     CHAITANYA (obstructive sleep apnea)     Bacteremia 05/09/2020    Acute respiratory failure with hypoxia ([address] 05/07/2020    Viral pneumonia 05/07/2020    Morbid obesity with BMI of 50.0-59.9, adult ([address] 05/07/2020    Diverticulosis 10/20/2018    Acute blood loss anemia 10/20/2018    Diabetes mellitus type 2, controlled ([address] 10/20/2018    Dyspnea on exertion 10/04/2018    Aortic valve regurgitation 10/06/2016    Disorder of aorta ([address] 06/22/2016    Electrocardiogram abnormal 06/22/2016    Morbid obesity ([address] 06/22/2016    Tobacco user 06/22/2016    Instability of right hip joint 06/29/2015    Primary osteoarthritis of left hip 12/11/2014    Primary osteoarthritis of left hip 11/26/2014       Past Medical History:   Diagnosis Date    Arthritis     osteo    Diabetes mellitus ([address]     Full dentures     only wears uppers    Gout     h/o    History of blood transfusion     Sleep apnea     uses CPAP       Family History   Problem Relation Age of Onset    Other Mother     Other Father     Other Brother        Social History     Tobacco Use    Smoking status: Current Some Day Smoker     Packs/day: 0.50     Years: 30.00     Pack years: 15.00 Start date: 1970     Last attempt to quit: 2020     Years since quittin.5    Smokeless tobacco: Former User   Substance Use Topics    Alcohol use: Yes     Comment: \"a lot\"', none since 2015. Current Outpatient Medications   Medication Sig Dispense Refill    atorvastatin (LIPITOR) 20 MG tablet Take 20 mg by mouth daily      aspirin 81 MG EC tablet Take 1 tablet every day by oral route.  amLODIPine (NORVASC) 10 MG tablet Take 10 mg by mouth daily      apixaban (ELIQUIS) 5 MG TABS tablet Take 5 mg by mouth 2 times daily      atenolol (TENORMIN) 50 MG tablet Take 50 mg by mouth daily      ferrous sulfate (FE TABS) 325 (65 Fe) MG EC tablet Take 1 tablet by mouth 2 times daily 90 tablet 3    metFORMIN (GLUCOPHAGE) 500 MG tablet Take 500 mg by mouth 2 times daily (with meals)      Omega-3 Fatty Acids (FISH OIL) 1000 MG CAPS Take 1,000 mg by mouth every morning      allopurinol (ZYLOPRIM) 100 MG tablet Take 100 mg by mouth every morning       vitamin D (CHOLECALCIFEROL) 1000 UNIT TABS tablet Take 1,000 Units by mouth nightly        No current facility-administered medications for this visit. No Known Allergies    ROS:   Constitutional: Negative for fever, activity change and appetite change. HENT: Negative for epistaxis. Eyes: Negative for diploplia, blurred vision. Respiratory: Negative for cough, chest tightness, shortness of breath and wheezing. Cardiovascular: pertinent positives in HPI  Gastrointestinal: Negative for abdominal pain and blood in stool.    All other review of systems are negative     PHYSICAL EXAM:   Vitals:    21 1247   BP: (!) 152/84   Site: Right Upper Arm   Position: Sitting   Cuff Size: Large Adult   Pulse: 70   Resp: 16   Weight: (!) 435 lb 9.6 oz (197.6 kg)   Height: 6' (1.829 m)      Constitutional: Well-developed, no acute distress  Eyes: conjunctivae normal, no xanthelasma   Ears, Nose, Throat: oral mucosa moist, no cyanosis   CV: no JVD. IRIR. No murmurs, rubs, or gallops. PMI is nondisplaced  Lungs: clear to auscultation bilaterally, normal respiratory effort without used of accessory muscles  Abdomen: soft, non-tender, bowel sounds present, no masses or hepatomegaly   Musculoskeletal: no digital clubbing, trace edema of LEs  Skin: warm, no rashes     I have personally reviewed the laboratory, cardiac diagnostic and radiographic testing as outlined below:    Data:    No results for input(s): WBC, HGB, HCT, PLT in the last 72 hours. No results for input(s): NA, K, CL, CO2, BUN, CREATININE, CALCIUM in the last 72 hours. Invalid input(s): GLU, MAGNESIUM   No results found for: MG  No results for input(s): TSH in the last 72 hours. No results for input(s): INR in the last 72 hours. CXR: 5/30/21 [facility]  IMPRESSION:     No acute disease identified in the lungs or mediastinum. Specifically, I   detect no evidence of pneumothorax. There has been little interval change   since the exam dated 05/29/2021. : ARIELLE     Transcribe Date/Time: May 30 2021  2:04P     Dictated by : Richie Chan MD     This examination was interpreted and the report reviewed and   electronically signed by:   Richie Chan MD on May 30 2021  2:06PM  EST    EKG 11/03/21: Atrial fibrillation 70 bpm, low voltage QRS, LAD, old anterior MI,  , QTc 399 ms. Please see scan in Cardiology. Echocardiogram: [facility]  CONCLUSIONS:   - Exam indication: s/p TAVR   - The left ventricle is normal in size. Left ventricular systolic function is   normal. EF = 65 ± 5% (visual est.)   - The right ventricle is normal in size. Right ventricular systolic function is   normal.   - The left atrial cavity is severely dilated. - The right atrial cavity is dilated. - S/P transcatheter aortic valve replacement. Contreras S3 prosthetic aortic valve   (size #29). There is no aortic valve regurgitation.  The peak gradient is 35 mmHg,   the mean gradient is 18 mmHg and the dimensionless valve index is 0.50. Prior AV   gradients of 28/16 mmHg. - Exam was compared with the prior [facility] echocardiographic exam performed on   2021. There is no significant change. Electronically signed by Kristopher Islas MD on 2021 at 11:29:13 AM       Narrative          Echocardiography Report: Transthoracic Echo   [facility]   Date of service: 2021 9:27:19 AM   Accession #: [id number]     Ordering physician: Mike Brunner   Indication: s/p TAVR     Technologist: Ernestina Ward [facility]   Interpreting physician: Kristopher Islas MD     PATIENT:   Name: Dinora Casanova   MRN: [id number]   : 1955   Age: 77 years   Gender: M     History of arrhythmia, valvular heart disease and diabetes mellitus. Previous cardiovascular interventions: Alternative access TAVR (2021)     Primary rhythm: atrial fib. Height: 182.90 cm BSA: 2.98 m²   Weight: 174.64 kg BMI: 52.2 kg/m²       Heart rate 79 bpm     Color Doppler was utilized to interrogate the cardiac valves assessed and spectral    Doppler was utilized to determine the flow velocities and pressure gradients   reported in this exam.     MEASUREMENTS:                            Value              Indexed    Normal   Left atrium diameter     6.1 cm (M-Mode)   Left atrial volume       165 ml (4ch A-L)   56 ml/m²   Melissa <= 34   LV ID (diastole)         4.9 cm (2D)        1.66 cm/m²   LV ID (systole)          5.4 cm (2D)        1.81 cm/m²   IVS, leaflet tips        1.3 cm (2D)   Posterior wall thickness 1.5 cm (2D)   Left ventricular mass    282 g (2D)         95 g/m²   LVOT stroke volume       159 ml             56 ml/m²   Ejection Fraction        65 % (visual est.)            EF > 52       FINDINGS:     LEFT VENTRICLE   The left ventricle is normal in size. Left ventricular systolic function is normal.   Left ventricular diastolic function was not evaluated due to AF.    Wall Motion:   All scored segments are normal. RIGHT VENTRICLE   The right ventricle is normal in size. Right ventricular systolic function is normal. RV systolic tissue Doppler velocity    is 15.0 cm/s. Estimated right ventricular systolic pressure is not reported due to an   insufficient tricuspid regurgitation signal. Estimated right atrial pressure is   not included as the IVC was not seen. LEFT ATRIUM   The left atrial cavity is severely dilated. RIGHT ATRIUM   The right atrial cavity is dilated. MITRAL VALVE   There is trace mitral valve regurgitation. There is no thickening. TRICUSPID VALVE   There is trace tricuspid valve regurgitation. There is no thickening. AORTIC VALVE   Contreras S3 prosthetic valve size #29. There is no aortic valve regurgitation. S/P   transcatheter aortic valve replacement. The peak gradient is 35 mmHg (peak   velocity = 294.2 cm/s). The mean gradient is 18 mmHg. The LVOT mean velocity is   84.7 cm/s. The LVOT diameter is 2.9 cm. The aortic VTI is 48.2 cm. The mean   velocity in the aortic valve is 197.5 cm/s. The dimensionless valve index is 0.50.    AV area is 3.30 cm² (1.11 cm²/m²) by continuity, VTI. The LVOT stroke volume   index is 56 ml/m². PULMONIC VALVE   There is trace pulmonic valve regurgitation. There is no thickening. AORTA   The aorta is unseen or not interrogated. PULMONARY ARTERIES   The pulmonary arteries are unseen or not interrogated. PERICARDIUM   There is a trivial pericardial effusion. There is an epicardial fat pad. Date: 21   Received From: [facility]          Cardiac Catheterization: 21 ([facility]- full report Care Everywhere)    PATIENT INFORMATION   +-------------------+     Name:        MR. Joanne Jansen   MRN:         [id number]   Accession #: [id number]   :         1955   Age:         66 years   Gender:      M   Height: 72 in / 183 cm   Weight: 460.00 lb / 208.66 kg   BMI:    62.31 kg/m²   BSA:    3.04 m²     Allergies: NKDA +------------------------------+   CLINICAL HISTORY/INDICATION(s)   +------------------------------+     Severe chronic native or prosthetic aortic stenosis, patient asymptomatic related   to valvular disease; AUC score = 4Preoperative assessment before valvular surgery;    AUC score = 7. Procedural Status: Elective     CAD Presentation: No Symptoms, No Angina     Angina Classification (within 2 weeks): No Angina     No Heart Failure     77year old White male with a history of of severe AS, incomplete LBBB, DM2,   morbid obesity, CHAITANYA gout and newly diagnosed AF who presents for [facility] prior to TAVR    evaluation. Access Point        Sheath Size Hemostasis Method   Right Radial Artery 5F Short    Radial TR band       +-------------------+   DIAGNOSTIC FINDINGS   +-------------------+     Coronary Anatomy: Right Dominant     Injection Site(s): Coronary Artery     LMT: _ The LMT is normal.     LAD:   _ The LAD has mild luminal irregularities. LCX: _ The Circumflex is normal.     RAMUS: _ The Ramus is Absent. RCA:   _ RCA is normal.     +------------+   HEMODYNAMICS   +------------+       +---------------+   IMPRESSION/PLAN   +---------------+     Impression:   1. Right dominant system. 2. Mild non-obstructive CAD. 3. Unable to cross aortic valve with catheter due to radial/subclavian spasm. Recommended Treatment: Valve repair / replacement and Medical Therapy. Plan: TAVR vs SAVR evaluation. No indication for revascularization prior or at the    time of valve replacement.     +----------------------------------+   ADVERSE OUTCOME(s)/COMPLICATION(s)   +----------------------------------+     None     CTA of chest/abdomen/pelvis 4/8/21 ([facility])  IMPRESSION:     1.  Trileaflet aortic valve with severe cusp calcifications.  Aortic   valve orifice area = 0.8 cm2. The aortic annulus measures 3.0 x 2.6 cm; cross-sectional area = 5.9 cm2;   circumference = 8.8 cm.      2.  Normal caliber thoracic and abdominal aorta.  No acute aortic   pathology identified.  The pelvic arteries, including the common femoral   arteries are also normal in course, caliber, and contour.  The minimal   luminal caliber throughout = 0.9 cm, bilaterally. 3.  Mild right and severe left atrial enlargement.  Mild mitral annular   calcification. : ARIELLE     Transcribe Date/Time: Apr 8 2021  3:38P     Dictated by : Doron Callejas MD     This examination was interpreted and the report reviewed and   electronically signed by:   Doron Callejas MD on Apr 8 2021  5:43PM  EST     Date: 04/08/21   Received From: [address] test/Cardiac MRI (Dr Austin Zavala)          I have independently reviewed all of the ECGs and rhythm strips per above     Assessment/Plan: This is a 77 y.o. male with a history of   Patient Active Problem List   Diagnosis    Primary osteoarthritis of left hip    Primary osteoarthritis of left hip    Instability of right hip joint    Diverticulosis    Acute blood loss anemia    Diabetes mellitus type 2, controlled ([address]    Acute respiratory failure with hypoxia ([address]    Viral pneumonia    Morbid obesity with BMI of 50.0-59.9, adult ([address]    Bacteremia    Transaminitis    CHAITANYA (obstructive sleep apnea)    Atelectasis    Chronic atrial fibrillation ([address]    Discharge planning issues    Disorder of aorta (HCC)    Dyspnea on exertion    Electrocardiogram abnormal    Aortic valve regurgitation    Morbid obesity (HCC)    On home O2    Postoperative hypertension    Stomach ulcer    Tobacco user    who presents with persistent AF s/p TAVR.      1. Persistent atrial fbrillation  - Documented AF per EKG at [facility] since 4/12/21 before TAVR 5/26/21  - Asymptomatic.   - [id number] score: 3  - On Eliquis 5 mg BID for stroke risk reduction.  - On Atenolol 50 mg QD for rate control.  - TTE 7/16/21 ([facility]): Left ventricular  EF = 65 ± 5% (visual est.) The left atrial cavity is severely dilated and the right atrial cavity is dilated. - Had an extensive discussion regarding options between rate and rhythm control and the patient is currently deciding between rate control and rhythm control strategy. - For rhythm control we have chosen to utilize cardioversion with the addition of AAD therapy. He is not a good candidate for ablation.  - Had an extensive discussion regarding all secondary causes of AF which include but are not limited to the following and then need for lifestyle modifications and stringent control of contributing medical conditions which include            - Weight Loss: aggressive weight loss and regular moderate cardiopulmonary exercise to maintain BMI <27 with a loss of at least 10% of their current weight which is safely and adequately maintained            - Exercise: 30min 3-4x/week of at least moderate cardiopulmonary exercise up to 250 min/wk            - Blood pressure: target of <130/80mmHg            - Dyslipidemia: add a statin if LDL >100mg/dL            - Diabetes Mellitus: add Metformin if HbA1c >6.5%            - Obstructive sleep apnea: evaluate for and or treat with CPAP if AHI >30/hour            - Abstinence from alcohol and tobacco products  - Discussed the potential improvement in all atrial fibrillation therapies if diet/exercise and weight loss are achieved per above. 2. Left anterior fascicular block    3. Aortic valve disease  - S/p TAVR 5/26/21 ([facility])  - TTE 7/16/21 ([facility]) - S/P transcatheter aortic valve replacement. Contreras S3 prosthetic aortic valve   (size #29). There is no aortic valve regurgitation. The peak gradient is 35 mmHg,   the mean gradient is 18 mmHg and the dimensionless valve index is 0.50. Prior AV   gradients of 28/16 mmHg  - Follows with Dr Rome Eli. 4. CHAITANYA  - Compliant with BiPAP. 5. Morbid obesity  - Encouraged aggressive weight loss. - Body mass index is 59.08 kg/m².       6. Diabetes mellitus  - On Metformin  -

## 2021-11-03 ENCOUNTER — OFFICE VISIT (OUTPATIENT)
Dept: NON INVASIVE DIAGNOSTICS | Age: 66
End: 2021-11-03
Payer: MEDICARE

## 2021-11-03 VITALS
BODY MASS INDEX: 42.66 KG/M2 | HEIGHT: 72 IN | WEIGHT: 315 LBS | SYSTOLIC BLOOD PRESSURE: 152 MMHG | RESPIRATION RATE: 16 BRPM | DIASTOLIC BLOOD PRESSURE: 84 MMHG | HEART RATE: 70 BPM

## 2021-11-03 DIAGNOSIS — I48.20 CHRONIC ATRIAL FIBRILLATION (HCC): Primary | ICD-10-CM

## 2021-11-03 PROBLEM — Z02.9 DISCHARGE PLANNING ISSUES: Status: ACTIVE | Noted: 2021-11-03

## 2021-11-03 PROBLEM — Z99.81 ON HOME O2: Status: ACTIVE | Noted: 2021-05-25

## 2021-11-03 PROBLEM — Z75.8 DISCHARGE PLANNING ISSUES: Status: ACTIVE | Noted: 2021-11-03

## 2021-11-03 PROBLEM — R06.09 DYSPNEA ON EXERTION: Status: ACTIVE | Noted: 2018-10-04

## 2021-11-03 PROBLEM — K25.9 STOMACH ULCER: Status: ACTIVE | Noted: 2021-11-03

## 2021-11-03 PROBLEM — I97.3 POSTOPERATIVE HYPERTENSION: Status: ACTIVE | Noted: 2021-05-28

## 2021-11-03 PROBLEM — J98.11 ATELECTASIS: Status: ACTIVE | Noted: 2021-05-26

## 2021-11-03 PROCEDURE — 1123F ACP DISCUSS/DSCN MKR DOCD: CPT | Performed by: INTERNAL MEDICINE

## 2021-11-03 PROCEDURE — 99205 OFFICE O/P NEW HI 60 MIN: CPT | Performed by: INTERNAL MEDICINE

## 2021-11-03 PROCEDURE — G8427 DOCREV CUR MEDS BY ELIG CLIN: HCPCS | Performed by: INTERNAL MEDICINE

## 2021-11-03 PROCEDURE — 4040F PNEUMOC VAC/ADMIN/RCVD: CPT | Performed by: INTERNAL MEDICINE

## 2021-11-03 PROCEDURE — 4004F PT TOBACCO SCREEN RCVD TLK: CPT | Performed by: INTERNAL MEDICINE

## 2021-11-03 PROCEDURE — G8484 FLU IMMUNIZE NO ADMIN: HCPCS | Performed by: INTERNAL MEDICINE

## 2021-11-03 PROCEDURE — 93000 ELECTROCARDIOGRAM COMPLETE: CPT | Performed by: INTERNAL MEDICINE

## 2021-11-03 PROCEDURE — G8417 CALC BMI ABV UP PARAM F/U: HCPCS | Performed by: INTERNAL MEDICINE

## 2021-11-03 PROCEDURE — 3017F COLORECTAL CA SCREEN DOC REV: CPT | Performed by: INTERNAL MEDICINE

## 2021-11-03 RX ORDER — ATORVASTATIN CALCIUM 20 MG/1
20 TABLET, FILM COATED ORAL DAILY
COMMUNITY
End: 2022-01-03

## 2021-11-03 RX ORDER — ASPIRIN 81 MG/1
TABLET ORAL
COMMUNITY

## 2021-11-04 ENCOUNTER — TELEPHONE (OUTPATIENT)
Dept: NON INVASIVE DIAGNOSTICS | Age: 66
End: 2021-11-04

## 2021-11-30 ENCOUNTER — TELEPHONE (OUTPATIENT)
Dept: NON INVASIVE DIAGNOSTICS | Age: 66
End: 2021-11-30

## 2021-11-30 NOTE — TELEPHONE ENCOUNTER
Pt called with questions regarding his upcoming hospital stay for rhythm control treatment of his afib.   Please call him at [phone]

## 2021-12-08 ENCOUNTER — HOSPITAL ENCOUNTER (INPATIENT)
Age: 66
LOS: 3 days | Discharge: HOME | DRG: 309 | End: 2021-12-11
Attending: INTERNAL MEDICINE | Admitting: INTERNAL MEDICINE
Payer: MEDICARE

## 2021-12-08 PROBLEM — I48.19 PERSISTENT ATRIAL FIBRILLATION (HCC): Status: ACTIVE | Noted: 2021-12-08

## 2021-12-08 LAB
ALBUMIN SERPL-MCNC: 4.3 G/DL (ref 3.5–5.2)
ALP BLD-CCNC: 111 U/L (ref 40–129)
ALT SERPL-CCNC: 21 U/L (ref 0–40)
ANION GAP SERPL CALCULATED.3IONS-SCNC: 15 MMOL/L (ref 7–16)
AST SERPL-CCNC: 32 U/L (ref 0–39)
BILIRUB SERPL-MCNC: 0.7 MG/DL (ref 0–1.2)
BUN BLDV-MCNC: 16 MG/DL (ref 6–23)
CALCIUM SERPL-MCNC: 9.9 MG/DL (ref 8.6–10.2)
CHLORIDE BLD-SCNC: 102 MMOL/L (ref 98–107)
CO2: 23 MMOL/L (ref 22–29)
CREAT SERPL-MCNC: 0.8 MG/DL (ref 0.7–1.2)
EKG ATRIAL RATE: 300 BPM
EKG Q-T INTERVAL: 410 MS
EKG QRS DURATION: 96 MS
EKG QTC CALCULATION (BAZETT): 416 MS
EKG R AXIS: -35 DEGREES
EKG T AXIS: 30 DEGREES
EKG VENTRICULAR RATE: 62 BPM
GFR AFRICAN AMERICAN: >60
GFR NON-AFRICAN AMERICAN: >60 ML/MIN/1.73
GLUCOSE BLD-MCNC: 156 MG/DL (ref 74–99)
HCT VFR BLD CALC: 45.2 % (ref 37–54)
HEMOGLOBIN: 14.7 G/DL (ref 12.5–16.5)
MAGNESIUM: 1.5 MG/DL (ref 1.6–2.6)
MCH RBC QN AUTO: 30.2 PG (ref 26–35)
MCHC RBC AUTO-ENTMCNC: 32.5 % (ref 32–34.5)
MCV RBC AUTO: 93 FL (ref 80–99.9)
PDW BLD-RTO: 13.5 FL (ref 11.5–15)
PLATELET # BLD: 158 E9/L (ref 130–450)
PMV BLD AUTO: 10.6 FL (ref 7–12)
POTASSIUM SERPL-SCNC: 4.9 MMOL/L (ref 3.5–5)
RBC # BLD: 4.86 E12/L (ref 3.8–5.8)
SODIUM BLD-SCNC: 140 MMOL/L (ref 132–146)
TOTAL PROTEIN: 7.7 G/DL (ref 6.4–8.3)
WBC # BLD: 12.1 E9/L (ref 4.5–11.5)

## 2021-12-08 PROCEDURE — 36415 COLL VENOUS BLD VENIPUNCTURE: CPT

## 2021-12-08 PROCEDURE — 6370000000 HC RX 637 (ALT 250 FOR IP): Performed by: INTERNAL MEDICINE

## 2021-12-08 PROCEDURE — 2580000003 HC RX 258: Performed by: INTERNAL MEDICINE

## 2021-12-08 PROCEDURE — 6370000000 HC RX 637 (ALT 250 FOR IP): Performed by: NURSE PRACTITIONER

## 2021-12-08 PROCEDURE — 99223 1ST HOSP IP/OBS HIGH 75: CPT | Performed by: INTERNAL MEDICINE

## 2021-12-08 PROCEDURE — 2140000000 HC CCU INTERMEDIATE R&B

## 2021-12-08 PROCEDURE — 93005 ELECTROCARDIOGRAM TRACING: CPT | Performed by: NURSE PRACTITIONER

## 2021-12-08 PROCEDURE — 80053 COMPREHEN METABOLIC PANEL: CPT

## 2021-12-08 PROCEDURE — 6360000002 HC RX W HCPCS: Performed by: NURSE PRACTITIONER

## 2021-12-08 PROCEDURE — 85027 COMPLETE CBC AUTOMATED: CPT

## 2021-12-08 PROCEDURE — 93010 ELECTROCARDIOGRAM REPORT: CPT | Performed by: INTERNAL MEDICINE

## 2021-12-08 PROCEDURE — 83735 ASSAY OF MAGNESIUM: CPT

## 2021-12-08 RX ORDER — ASCORBIC ACID 500 MG
500 TABLET ORAL 2 TIMES DAILY
Status: DISCONTINUED | OUTPATIENT
Start: 2021-12-08 | End: 2021-12-11 | Stop reason: HOSPADM

## 2021-12-08 RX ORDER — ACETAMINOPHEN 325 MG/1
650 TABLET ORAL EVERY 6 HOURS PRN
Status: DISCONTINUED | OUTPATIENT
Start: 2021-12-08 | End: 2021-12-11 | Stop reason: HOSPADM

## 2021-12-08 RX ORDER — ACETAMINOPHEN 650 MG/1
650 SUPPOSITORY RECTAL EVERY 6 HOURS PRN
Status: DISCONTINUED | OUTPATIENT
Start: 2021-12-08 | End: 2021-12-11 | Stop reason: HOSPADM

## 2021-12-08 RX ORDER — ATORVASTATIN CALCIUM 20 MG/1
20 TABLET, FILM COATED ORAL DAILY
Status: DISCONTINUED | OUTPATIENT
Start: 2021-12-08 | End: 2021-12-11 | Stop reason: HOSPADM

## 2021-12-08 RX ORDER — ALLOPURINOL 100 MG/1
100 TABLET ORAL EVERY MORNING
Status: DISCONTINUED | OUTPATIENT
Start: 2021-12-08 | End: 2021-12-11 | Stop reason: HOSPADM

## 2021-12-08 RX ORDER — SODIUM CHLORIDE 0.9 % (FLUSH) 0.9 %
5-40 SYRINGE (ML) INJECTION PRN
Status: DISCONTINUED | OUTPATIENT
Start: 2021-12-08 | End: 2021-12-11 | Stop reason: HOSPADM

## 2021-12-08 RX ORDER — ASPIRIN 81 MG/1
81 TABLET ORAL DAILY
Status: DISCONTINUED | OUTPATIENT
Start: 2021-12-09 | End: 2021-12-11 | Stop reason: HOSPADM

## 2021-12-08 RX ORDER — ASCORBIC ACID 500 MG
500 TABLET ORAL 2 TIMES DAILY
COMMUNITY

## 2021-12-08 RX ORDER — DOFETILIDE 0.25 MG/1
500 CAPSULE ORAL EVERY 12 HOURS SCHEDULED
Status: DISCONTINUED | OUTPATIENT
Start: 2021-12-08 | End: 2021-12-11 | Stop reason: HOSPADM

## 2021-12-08 RX ORDER — CHLORAL HYDRATE 500 MG
1000 CAPSULE ORAL EVERY MORNING
Status: DISCONTINUED | OUTPATIENT
Start: 2021-12-08 | End: 2021-12-08 | Stop reason: RX

## 2021-12-08 RX ORDER — SODIUM CHLORIDE 0.9 % (FLUSH) 0.9 %
5-40 SYRINGE (ML) INJECTION EVERY 12 HOURS SCHEDULED
Status: DISCONTINUED | OUTPATIENT
Start: 2021-12-08 | End: 2021-12-11 | Stop reason: HOSPADM

## 2021-12-08 RX ORDER — ATENOLOL 25 MG/1
25 TABLET ORAL DAILY
Status: DISCONTINUED | OUTPATIENT
Start: 2021-12-08 | End: 2021-12-11 | Stop reason: HOSPADM

## 2021-12-08 RX ORDER — AMLODIPINE BESYLATE 10 MG/1
10 TABLET ORAL DAILY
Status: DISCONTINUED | OUTPATIENT
Start: 2021-12-09 | End: 2021-12-11 | Stop reason: HOSPADM

## 2021-12-08 RX ORDER — FERROUS SULFATE 325(65) MG
325 TABLET ORAL 2 TIMES DAILY WITH MEALS
Status: DISCONTINUED | OUTPATIENT
Start: 2021-12-08 | End: 2021-12-11 | Stop reason: HOSPADM

## 2021-12-08 RX ORDER — POLYETHYLENE GLYCOL 3350 17 G/17G
17 POWDER, FOR SOLUTION ORAL DAILY PRN
Status: DISCONTINUED | OUTPATIENT
Start: 2021-12-08 | End: 2021-12-11 | Stop reason: HOSPADM

## 2021-12-08 RX ORDER — MAGNESIUM SULFATE IN WATER 40 MG/ML
2000 INJECTION, SOLUTION INTRAVENOUS ONCE
Status: COMPLETED | OUTPATIENT
Start: 2021-12-08 | End: 2021-12-08

## 2021-12-08 RX ORDER — SODIUM CHLORIDE 9 MG/ML
25 INJECTION, SOLUTION INTRAVENOUS PRN
Status: DISCONTINUED | OUTPATIENT
Start: 2021-12-08 | End: 2021-12-11 | Stop reason: HOSPADM

## 2021-12-08 RX ORDER — VITAMIN B COMPLEX
1000 TABLET ORAL NIGHTLY
Status: DISCONTINUED | OUTPATIENT
Start: 2021-12-08 | End: 2021-12-11 | Stop reason: HOSPADM

## 2021-12-08 RX ADMIN — Medication 1000 UNITS: at 21:20

## 2021-12-08 RX ADMIN — METFORMIN HYDROCHLORIDE 500 MG: 500 TABLET ORAL at 17:40

## 2021-12-08 RX ADMIN — OXYCODONE HYDROCHLORIDE AND ACETAMINOPHEN 500 MG: 500 TABLET ORAL at 21:20

## 2021-12-08 RX ADMIN — Medication 10 ML: at 21:21

## 2021-12-08 RX ADMIN — APIXABAN 5 MG: 5 TABLET, FILM COATED ORAL at 21:20

## 2021-12-08 RX ADMIN — DOFETILIDE 500 MCG: 0.25 CAPSULE ORAL at 18:02

## 2021-12-08 RX ADMIN — FERROUS SULFATE TAB 325 MG (65 MG ELEMENTAL FE) 325 MG: 325 (65 FE) TAB at 17:40

## 2021-12-08 RX ADMIN — MAGNESIUM SULFATE HEPTAHYDRATE 2000 MG: 40 INJECTION, SOLUTION INTRAVENOUS at 14:52

## 2021-12-08 NOTE — H&P
[facility],[address] Floor and [facility] Electrophysiology  History and Physical Examination  PATIENT: [facility] RECORD NUMBER: [id number]  DATE OF SERVICE:  12/8/2021  ATTENDING ELECTROPHYSIOLOGIST: Chloe Florentino MD  REFERRING PHYSICIAN: No ref. provider found and Katheryn Valentin MD  CHIEF COMPLAINT: Persistent atrial fibrillation    HPI: This is a 77 y.o. male with a history of   Patient Active Problem List   Diagnosis    Primary osteoarthritis of left hip    Primary osteoarthritis of left hip    Instability of right hip joint    Diverticulosis    Acute blood loss anemia    Diabetes mellitus type 2, controlled ([address]    Acute respiratory failure with hypoxia ([address]    Viral pneumonia    Morbid obesity with BMI of 50.0-59.9, adult ([address]    Bacteremia    Transaminitis    CHAITANYA (obstructive sleep apnea)    Atelectasis    Chronic atrial fibrillation ([address]    Discharge planning issues    Disorder of aorta (HCC)    Dyspnea on exertion    Electrocardiogram abnormal    Aortic valve regurgitation    Morbid obesity ([address]    On home O2    Postoperative hypertension    Stomach ulcer    Tobacco user   Initial consultation 11/3/21: The patient presents to cardiac electrophysiology clinic for consultation of persistent AF, s/p TAVR  5/26/21 ([facility]). His PMHx is noted below and includes: VHD: mod/severe AS-s/p TAVR; mild TR; trace MR; persistent AF on [address] [place] [address] Road; DM; CHAITANYA; morbid obesity; BERNAL. Current medications: amlodipine, apixaban, tenormin, lisinopril, metformin, allopurinol, ferrous sulfate, pantoprazole, and vitamins. He follows with Dr Peggy Jhaveri and [facility] for his history of VHD and underwent TAVR procedure on 5/26/21. Since the TAVR procedure he has been in AF on Eliquis. A TTE on  7/16/21 ([facility]) shows an LVEF 65%, severely dilated LA and dilated RA (see below/Care Everywhere). A cardiac MRI showed no evidence of scar.  [facility] recommended an EP referral and the patient opted to follow locally. The patient reports he did have syncopal episode after drinking at a bar, he does report consuming ETOH at that time. He presents today in AF with CVR. He reports he is unable to tell when he is in AF and remains asymptomatic. In terms of rhythm control we discussed about cardioversion with the addition of AAD therapy. Also we discussed about rate control strategy due to being asymptomatic. I don't believe AF catheter ablation would be a option due to obesity. The patient denies any chest pain, dyspnea, palpitations, dizziness, syncope, orthopnea or paroxysmal nocturnal dyspnea. 12/8/21: The patient presents to the hospital for elective admission for Tikosyn initiation. The patient opts for rhythm control treatment with Tikosyn initiation and possible DC-cardioversion. He reported chronic dyspnea on exertion. The patient denies any chest pain, palpitations, dizziness, syncope, orthopnea or paroxysmal nocturnal dyspnea. He presents today in AF with CVR. Patient Active Problem List    Diagnosis Date Noted    Discharge planning issues 11/03/2021     Overview Note:     Formatting of this note might be different from the original.  Sommer Lopez is a 77year old male from [place], [place]    Anticipated Discharge Needs:  No anticipated DC needs and CM consult for placement      Stomach ulcer 11/03/2021     Overview Note:     Formatting of this note might be different from the original.  History: per EPIC, takes protonix 40mg BID at home  Assessment: no c/o epigastric discomfort  Plan: Continue pantoprazole 40mg BID      Postoperative hypertension 05/28/2021     Overview Note:     Formatting of this note might be different from the original.  History: Post op prob  Assessment: SBPs 120s-140s  Plan:  continue low dose norvasc, IV lasix.  Consider starting BB in setting of afib if rate increases      Atelectasis 05/26/2021     Overview Note:     Formatting of this note might be different from the original.  History: postop  Assessment: diffuse atelectasis    Plan: PEP, up OOB to chair daily, cough and deep breathing      Chronic atrial fibrillation ([address] 05/25/2021     Overview Note:     Formatting of this note might be different from the original.  History: Pt reports irregular heart beat for at least 2 years. Reports never being told of afib and has not been on [facility]. Preop ECG 4/8/2021 showed Afib  Assessment: 5/28 likely accelerated junctional per EPS-monitor for now. Currently  appears in afib on tele (HR 50s-70s), but rate controlled. Plan: continue eliquis  per CONTRERAS. Continue to monitor off BB as HR controlled. received pharm message on 6/2 re: Eliquis not recommended for pt's with weight greater than 160kg as this has led to concerns that fixed dosed AC regimens may lead to alerted clearance and/or decreased drug exposure in obese patients. Based on the concerns of duration of his afib (history of this pre op yet not on any AC) and elevated weight, Coumadin may be more beneficial; however, spoke with Dr. Rosalina Nuñez who agreed to continue Eliquis at this time as this would be more beneficial then monotherapy with just ASA.       On home O2 05/25/2021    Transaminitis     CHAITANYA (obstructive sleep apnea)     Bacteremia 05/09/2020    Acute respiratory failure with hypoxia ([address] 05/07/2020    Viral pneumonia 05/07/2020    Morbid obesity with BMI of 50.0-59.9, adult ([address] 05/07/2020    Diverticulosis 10/20/2018    Acute blood loss anemia 10/20/2018    Diabetes mellitus type 2, controlled ([address] 10/20/2018    Dyspnea on exertion 10/04/2018    Aortic valve regurgitation 10/06/2016    Disorder of aorta ([address] 06/22/2016    Electrocardiogram abnormal 06/22/2016    Morbid obesity ([address] 06/22/2016    Tobacco user 06/22/2016    Instability of right hip joint 06/29/2015    Primary osteoarthritis of left hip 12/11/2014    Primary osteoarthritis of left hip 11/26/2014       Past Medical History: Diagnosis Date    Arthritis     osteo    Diabetes mellitus ([facility]     Full dentures     only wears uppers    Gout     h/o    History of blood transfusion     Sleep apnea     uses CPAP       Family History   Problem Relation Age of Onset    Other Mother     Other Father     Other Brother        Social History     Tobacco Use    Smoking status: Current Some Day Smoker     Packs/day: 0.50     Years: 30.00     Pack years: 15.00     Start date: 1970     Last attempt to quit: 2020     Years since quittin.6    Smokeless tobacco: Former User   Substance Use Topics    Alcohol use: Yes     Comment: about a bottle of whiskey per day. not since last week       No current facility-administered medications for this encounter. No Known Allergies    ROS:   Constitutional: Negative for fever, activity change and appetite change. HENT: Negative for epistaxis. Eyes: Negative for diploplia, blurred vision. Respiratory: Negative for cough, chest tightness, shortness of breath and wheezing. Cardiovascular: pertinent positives in HPI  Gastrointestinal: Negative for abdominal pain and blood in stool. All other review of systems are negative     PHYSICAL EXAM:   Vitals:    21 0901   BP: (!) 146/67   Pulse: 68   Resp: 22   Temp: 96.3 °F (35.7 °C)   TempSrc: Temporal   SpO2: 92%   Weight: (!) 446 lb 9.6 oz (202.6 kg)   Height: 6' (1.829 m)      Constitutional: Well-developed, no acute distress  Eyes: conjunctivae normal, no xanthelasma   Ears, Nose, Throat: oral mucosa moist, no cyanosis   CV: no JVD. IRIR. No murmurs, rubs, or gallops.  PMI is nondisplaced  Lungs: clear to auscultation bilaterally, normal respiratory effort without used of accessory muscles  Abdomen: soft, non-tender, bowel sounds present, no masses or hepatomegaly   Musculoskeletal: no digital clubbing, trace edema of LEs  Skin: warm, no rashes     I have personally reviewed the laboratory, cardiac diagnostic and radiographic testing as outlined below:    Data:    No results for input(s): WBC, HGB, HCT, PLT in the last 72 hours. No results for input(s): NA, K, CL, CO2, BUN, CREATININE, CALCIUM in the last 72 hours. Invalid input(s): GLU, MAGNESIUM   No results found for: MG  No results for input(s): TSH in the last 72 hours. No results for input(s): INR in the last 72 hours. CXR: 5/30/21 [facility]  IMPRESSION:     No acute disease identified in the lungs or mediastinum. Specifically, I   detect no evidence of pneumothorax. There has been little interval change   since the exam dated 05/29/2021. : PSCB     Transcribe Date/Time: May 30 2021  2:04P     Dictated by : Reggie Banuelos MD     This examination was interpreted and the report reviewed and   electronically signed by:   Reggie Banuelos MD on May 30 2021  2:06PM  EST    EKG 12/08/21: Atrial fibrillation 62 bpm, low voltage QRS, LAD, old anterior MI, QRS 96 , QTc 416 ms. Please see scan in Cardiology. Echocardiogram: [facility]  CONCLUSIONS:   - Exam indication: s/p TAVR   - The left ventricle is normal in size. Left ventricular systolic function is   normal. EF = 65 ± 5% (visual est.)   - The right ventricle is normal in size. Right ventricular systolic function is   normal.   - The left atrial cavity is severely dilated. - The right atrial cavity is dilated. - S/P transcatheter aortic valve replacement. Contreras S3 prosthetic aortic valve   (size #29). There is no aortic valve regurgitation. The peak gradient is 35 mmHg,   the mean gradient is 18 mmHg and the dimensionless valve index is 0.50. Prior AV   gradients of 28/16 mmHg. - Exam was compared with the prior [facility] echocardiographic exam performed on   05/31/2021. There is no significant change.        Electronically signed by Pepe Collins MD on 7/16/2021 at 11:29:13 AM       Narrative          Echocardiography Report: Transthoracic Echo   [facility], [place], operated by [facility]   Date of service: 7/16/2021 9:27:19 AM Accession #: [id number]     Ordering physician: Adelina Acuña   Indication: s/p TAVR     Technologist: Sal Baker [facility]   Interpreting physician: Sylvain Taylor MD     PATIENT:   Name: Andrew Mooney   MRN: [id number]   : 1955   Age: 77 years   Gender: M     History of arrhythmia, valvular heart disease and diabetes mellitus. Previous cardiovascular interventions: Alternative access TAVR (2021)     Primary rhythm: atrial fib. Height: 182.90 cm BSA: 2.98 m²   Weight: 174.64 kg BMI: 52.2 kg/m²       Heart rate 79 bpm     Color Doppler was utilized to interrogate the cardiac valves assessed and spectral    Doppler was utilized to determine the flow velocities and pressure gradients   reported in this exam.     MEASUREMENTS:                            Value              Indexed    Normal   Left atrium diameter     6.1 cm (M-Mode)   Left atrial volume       165 ml (4ch A-L)   56 ml/m²   Melissa <= 34   LV ID (diastole)         4.9 cm (2D)        1.66 cm/m²   LV ID (systole)          5.4 cm (2D)        1.81 cm/m²   IVS, leaflet tips        1.3 cm (2D)   Posterior wall thickness 1.5 cm (2D)   Left ventricular mass    282 g (2D)         95 g/m²   LVOT stroke volume       159 ml             56 ml/m²   Ejection Fraction        65 % (visual est.)            EF > 52       FINDINGS:     LEFT VENTRICLE   The left ventricle is normal in size. Left ventricular systolic function is normal.   Left ventricular diastolic function was not evaluated due to AF. Wall Motion:   All scored segments are normal.         RIGHT VENTRICLE   The right ventricle is normal in size. Right ventricular systolic function is normal. RV systolic tissue Doppler velocity    is 15.0 cm/s. Estimated right ventricular systolic pressure is not reported due to an   insufficient tricuspid regurgitation signal. Estimated right atrial pressure is   not included as the IVC was not seen.      LEFT ATRIUM   The left atrial cavity is weeks): No Angina     No Heart Failure     77year old White male with a history of of severe AS, incomplete LBBB, DM2,   morbid obesity, CHAITANYA gout and newly diagnosed AF who presents for [facility] prior to TAVR    evaluation. Access Point        Sheath Size Hemostasis Method   Right Radial Artery 5F Short    Radial TR band       +-------------------+   DIAGNOSTIC FINDINGS   +-------------------+     Coronary Anatomy: Right Dominant     Injection Site(s): Coronary Artery     LMT: _ The LMT is normal.     LAD:   _ The LAD has mild luminal irregularities. LCX: _ The Circumflex is normal.     RAMUS: _ The Ramus is Absent. RCA:   _ RCA is normal.     +------------+   HEMODYNAMICS   +------------+       +---------------+   IMPRESSION/PLAN   +---------------+     Impression:   1. Right dominant system. 2. Mild non-obstructive CAD. 3. Unable to cross aortic valve with catheter due to radial/subclavian spasm. Recommended Treatment: Valve repair / replacement and Medical Therapy. Plan: TAVR vs SAVR evaluation. No indication for revascularization prior or at the    time of valve replacement.     +----------------------------------+   ADVERSE OUTCOME(s)/COMPLICATION(s)   +----------------------------------+     None     CTA of chest/abdomen/pelvis 4/8/21 ([facility])  IMPRESSION:     1.  Trileaflet aortic valve with severe cusp calcifications.  Aortic   valve orifice area = 0.8 cm2. The aortic annulus measures 3.0 x 2.6 cm; cross-sectional area = 5.9 cm2;   circumference = 8.8 cm. 2.  Normal caliber thoracic and abdominal aorta.  No acute aortic   pathology identified.  The pelvic arteries, including the common femoral   arteries are also normal in course, caliber, and contour.  The minimal   luminal caliber throughout = 0.9 cm, bilaterally. 3.  Mild right and severe left atrial enlargement.  Mild mitral annular   calcification.            : ARIELLE     Transcribe Date/Time: Apr 8 2021  3:38P     Dictated by : Janel Cruz MD     This examination was interpreted and the report reviewed and   electronically signed by:   Janel Cruz MD on Apr 8 2021  5:43PM  EST     Date: 04/08/21   Received From: [address] test/Cardiac MRI (Dr Kailyn Sun)          I have independently reviewed all of the ECGs and rhythm strips per above     Assessment/Plan: This is a 77 y.o. male with a history of   Patient Active Problem List   Diagnosis    Primary osteoarthritis of left hip    Primary osteoarthritis of left hip    Instability of right hip joint    Diverticulosis    Acute blood loss anemia    Diabetes mellitus type 2, controlled ([address]    Acute respiratory failure with hypoxia ([address]    Viral pneumonia    Morbid obesity with BMI of 50.0-59.9, adult ([address]    Bacteremia    Transaminitis    CHAITANYA (obstructive sleep apnea)    Atelectasis    Chronic atrial fibrillation ([address]    Discharge planning issues    Disorder of aorta (HCC)    Dyspnea on exertion    Electrocardiogram abnormal    Aortic valve regurgitation    Morbid obesity (HCC)    On home O2    Postoperative hypertension    Stomach ulcer    Tobacco user    who presents with persistent AF s/p TAVR. 1. Persistent atrial fbrillation  - Documented AF per EKG at [facility] since 4/12/21 before TAVR 5/26/21  - Asymptomatic.   - [id number] score: 3  - On Eliquis 5 mg BID for stroke risk reduction.  - On Atenolol 50 mg QD for rate control.  - TTE 7/16/21 ([facility]): Left ventricular  EF = 65 ± 5% (visual est.) The left atrial cavity is severely dilated and the right atrial cavity is dilated. - Had an extensive discussion regarding options between rate and rhythm control and the patient opts for rhythm control.  - For rhythm control we have chosen to utilize cardioversion with the addition of Tikosyn therapy.  He is not a good candidate for ablation.  - Re-education on importance of well controlled HTN (goal BP < 130/80), adequate weight control (goal BMI of < 27), physical activity consisting of moderate cardiopulmonary exercise up to a goal of 250 min/wk, daily compliance with CPAP in treating sleep apnea, smoking cessation and limited ETOH intake. 2. Left anterior fascicular block    3. Aortic valve disease  - S/p TAVR 5/26/21 ([facility])  - TTE 7/16/21 ([facility]) - S/P transcatheter aortic valve replacement. Contreras S3 prosthetic aortic valve   (size #29). There is no aortic valve regurgitation. The peak gradient is 35 mmHg,   the mean gradient is 18 mmHg and the dimensionless valve index is 0.50. Prior AV   gradients of 28/16 mmHg  - Follows with Dr Lisa Cornelius. 4. CHAITANYA  - Compliant with BiPAP. 5. Morbid obesity  - Encouraged aggressive weight loss. - Body mass index is 60.57 kg/m². 6. Diabetes mellitus  - On Metformin  - Management per PCP     7. Hypertension  - On Amlodipine, Atenolol and Lisinopril    8. ETOH usage    9. Prior tobacco usage    10. Syncope  - Suspected vasovagal from history. Recommendations:    1. Start Tikosyn 500 mcg every 12 hours today evening base on CrCl of 98 by IBW. 2. ECG 2 hrs after each dose of Tikosyn. 3. Daily Mg and BMP. Keep potassium around 4 and magnesium around 2.  4. Telemetry monitor. 5. Will decrease Atenolol dose to 25 mg daily. Continue other medications as is. 6. If the patient remains in AF after his 4th dose of Tikosyn, will plan for electrical CV. I have spent a total of 70 minutes with the patient and the family reviewing the above stated recommendations. And a total of >50% of that time involved face-to-face time providing counseling and/or coordination of care with the other providers, reviewing records/tests, counseling/education of the patient, ordering medications/tests/procedures, coordinating care, and documenting clinical information in the EHR. Thank you for allowing me to participate in your patient's care. Please call me if there are any questions or concerns. Cleopatra Alcantara MD  Cardiac Electrophysiology  [facility]  [facility] [place]: [facility]  9:19 AM  12/8/2021

## 2021-12-08 NOTE — CARE COORDINATION
Transition of care: Met with pt in room. [place] home. Pt sleeps in the basement on a hospital bed. Independent with ADLs and drives. Not a . DME- FWW, cane, wheelchair, hospital bed, electric scooter, pulse ox, shower chair, BSC, bipap and oxygen at 3l/nc from [facility]. Hx of [facility] with [facility]. Discharge plan will be to return home. No needs voiced at present. PCP is Dr. Kingston Alves and pharmacy is [facility] in [place].  Sw/cm will follow has RW; occasionally uses [facility]; per son, patient walks indoors while holding on to walls, uses W/C for long distances.

## 2021-12-09 LAB
ANION GAP SERPL CALCULATED.3IONS-SCNC: 13 MMOL/L (ref 7–16)
BUN BLDV-MCNC: 15 MG/DL (ref 6–23)
CALCIUM SERPL-MCNC: 8.7 MG/DL (ref 8.6–10.2)
CHLORIDE BLD-SCNC: 101 MMOL/L (ref 98–107)
CO2: 23 MMOL/L (ref 22–29)
CREAT SERPL-MCNC: 0.8 MG/DL (ref 0.7–1.2)
EKG ATRIAL RATE: 258 BPM
EKG ATRIAL RATE: 87 BPM
EKG Q-T INTERVAL: 432 MS
EKG Q-T INTERVAL: 438 MS
EKG QRS DURATION: 100 MS
EKG QRS DURATION: 96 MS
EKG QTC CALCULATION (BAZETT): 445 MS
EKG QTC CALCULATION (BAZETT): 469 MS
EKG R AXIS: -42 DEGREES
EKG R AXIS: -46 DEGREES
EKG T AXIS: 0 DEGREES
EKG T AXIS: 19 DEGREES
EKG VENTRICULAR RATE: 64 BPM
EKG VENTRICULAR RATE: 69 BPM
GFR AFRICAN AMERICAN: >60
GFR NON-AFRICAN AMERICAN: >60 ML/MIN/1.73
GLUCOSE BLD-MCNC: 179 MG/DL (ref 74–99)
MAGNESIUM: 1.4 MG/DL (ref 1.6–2.6)
POTASSIUM SERPL-SCNC: 4.2 MMOL/L (ref 3.5–5)
SODIUM BLD-SCNC: 137 MMOL/L (ref 132–146)

## 2021-12-09 PROCEDURE — 99233 SBSQ HOSP IP/OBS HIGH 50: CPT | Performed by: INTERNAL MEDICINE

## 2021-12-09 PROCEDURE — 80048 BASIC METABOLIC PNL TOTAL CA: CPT

## 2021-12-09 PROCEDURE — 2140000000 HC CCU INTERMEDIATE R&B

## 2021-12-09 PROCEDURE — 83735 ASSAY OF MAGNESIUM: CPT

## 2021-12-09 PROCEDURE — 2580000003 HC RX 258: Performed by: INTERNAL MEDICINE

## 2021-12-09 PROCEDURE — 6370000000 HC RX 637 (ALT 250 FOR IP): Performed by: NURSE PRACTITIONER

## 2021-12-09 PROCEDURE — 93010 ELECTROCARDIOGRAM REPORT: CPT | Performed by: INTERNAL MEDICINE

## 2021-12-09 PROCEDURE — 93005 ELECTROCARDIOGRAM TRACING: CPT | Performed by: INTERNAL MEDICINE

## 2021-12-09 PROCEDURE — 36415 COLL VENOUS BLD VENIPUNCTURE: CPT

## 2021-12-09 PROCEDURE — 6370000000 HC RX 637 (ALT 250 FOR IP): Performed by: INTERNAL MEDICINE

## 2021-12-09 PROCEDURE — 6360000002 HC RX W HCPCS: Performed by: INTERNAL MEDICINE

## 2021-12-09 RX ORDER — MAGNESIUM SULFATE IN WATER 40 MG/ML
4000 INJECTION, SOLUTION INTRAVENOUS ONCE
Status: COMPLETED | OUTPATIENT
Start: 2021-12-09 | End: 2021-12-09

## 2021-12-09 RX ADMIN — METFORMIN HYDROCHLORIDE 500 MG: 500 TABLET ORAL at 10:16

## 2021-12-09 RX ADMIN — Medication 1000 UNITS: at 21:31

## 2021-12-09 RX ADMIN — ASPIRIN 81 MG: 81 TABLET, COATED ORAL at 10:17

## 2021-12-09 RX ADMIN — DOFETILIDE 500 MCG: 0.25 CAPSULE ORAL at 05:59

## 2021-12-09 RX ADMIN — AMLODIPINE BESYLATE 10 MG: 10 TABLET ORAL at 10:17

## 2021-12-09 RX ADMIN — Medication 10 ML: at 21:32

## 2021-12-09 RX ADMIN — ATENOLOL 25 MG: 25 TABLET ORAL at 10:16

## 2021-12-09 RX ADMIN — METFORMIN HYDROCHLORIDE 500 MG: 500 TABLET ORAL at 17:41

## 2021-12-09 RX ADMIN — APIXABAN 5 MG: 5 TABLET, FILM COATED ORAL at 21:32

## 2021-12-09 RX ADMIN — APIXABAN 5 MG: 5 TABLET, FILM COATED ORAL at 10:17

## 2021-12-09 RX ADMIN — Medication 400 MG: at 17:41

## 2021-12-09 RX ADMIN — ALLOPURINOL 100 MG: 100 TABLET ORAL at 10:16

## 2021-12-09 RX ADMIN — OXYCODONE HYDROCHLORIDE AND ACETAMINOPHEN 500 MG: 500 TABLET ORAL at 10:16

## 2021-12-09 RX ADMIN — MAGNESIUM SULFATE HEPTAHYDRATE 4000 MG: 40 INJECTION, SOLUTION INTRAVENOUS at 10:16

## 2021-12-09 RX ADMIN — Medication 10 ML: at 10:16

## 2021-12-09 RX ADMIN — DOFETILIDE 500 MCG: 0.25 CAPSULE ORAL at 17:46

## 2021-12-09 RX ADMIN — FERROUS SULFATE TAB 325 MG (65 MG ELEMENTAL FE) 325 MG: 325 (65 FE) TAB at 10:16

## 2021-12-09 RX ADMIN — FERROUS SULFATE TAB 325 MG (65 MG ELEMENTAL FE) 325 MG: 325 (65 FE) TAB at 17:41

## 2021-12-09 RX ADMIN — OXYCODONE HYDROCHLORIDE AND ACETAMINOPHEN 500 MG: 500 TABLET ORAL at 21:31

## 2021-12-09 NOTE — PATIENT CARE CONFERENCE
P Quality Flow/Interdisciplinary Rounds Progress Note        Quality Flow Rounds held on December 9, 2021    Disciplines Attending:  Bedside Nurse, ,  and Nursing Unit Leadership    Everardo Bella was admitted on 12/8/2021  8:51 AM    Anticipated Discharge Date:  Expected Discharge Date: 12/10/21    Disposition:    Augustin Score:  Augustin Scale Score: 19    Readmission Risk              Risk of Unplanned Readmission:  8           Discussed patient goal for the day, patient clinical progression, and barriers to discharge.   The following Goal(s) of the Day/Commitment(s) have been identified:  TACHO Rodrigues  December 9, 2021

## 2021-12-09 NOTE — PROGRESS NOTES
Cardiac Electrophysiology Inpatient Progress Note    Shira Guo  1955  Date of Service: 12/9/2021  PCP: Katheryn Valentin MD  Cardiologist: Dr Peggy Jhaveri   ATTENDING/[facility] ELECTROPHYSIOLOGIST: Chloe Florentino MD        Subjective: Shira Guo is seen in hospital follow-up and management of: Atrial fibrillation. Initial consultation 11/3/21: The patient presents to cardiac electrophysiology clinic for consultation of persistent AF, s/p TAVR  5/26/21 ([facility]). His PMHx is noted below and includes: VHD: mod/severe AS-s/p TAVR; mild TR; trace MR; persistent AF on [facility]; DM; CHAITANYA; morbid obesity; BERNAL. Current medications: amlodipine, apixaban, tenormin, lisinopril, metformin, allopurinol, ferrous sulfate, pantoprazole, and vitamins. He follows with Dr Peggy Jhaveri and [facility] for his history of VHD and underwent TAVR procedure on 5/26/21. Since the TAVR procedure he has been in AF on [facility]. A TTE on  7/16/21 ([facility]) shows an LVEF 65%, severely dilated LA and dilated RA (see below/Care Everywhere). A cardiac MRI showed no evidence of scar. [facility] recommended an EP referral and the patient opted to follow locally. The patient reports he did have syncopal episode after drinking at a bar, he does report consuming ETOH at that time. He presents today in AF with CVR. He reports he is unable to tell when he is in AF and remains asymptomatic. In terms of rhythm control we discussed about cardioversion with the addition of AAD therapy. Also we discussed about rate control strategy due to being asymptomatic. I don't believe AF catheter ablation would be a option due to obesity. The patient denies any chest pain, dyspnea, palpitations, dizziness, syncope, orthopnea or paroxysmal nocturnal dyspnea.     12/8/21: The patient presents to the hospital for elective admission for Tikosyn initiation. The patient opts for rhythm control treatment with Tikosyn initiation and possible DC-cardioversion.  He reported chronic dyspnea on Readings from Last 3 Encounters:   12/09/21 97.7 °F (36.5 °C) (Temporal)   08/05/21 98.5 °F (36.9 °C)   01/19/21 97.6 °F (36.4 °C)     BP Readings from Last 3 Encounters:   12/09/21 (!) 153/71   11/03/21 (!) 152/84   08/05/21 138/88     Pulse Readings from Last 3 Encounters:   12/09/21 60   11/03/21 70   08/05/21 78         Intake/Output Summary (Last 24 hours) at 12/9/2021 1249  Last data filed at 12/9/2021 1148  Gross per 24 hour   Intake 360 ml   Output 2400 ml   Net -2040 ml       ROS:   Constitutional: Negative for fever, activity change and appetite change. HENT: Negative for epistaxis. Eyes: Negative for diploplia, blurred vision. Respiratory: Negative for cough, chest tightness, shortness of breath and wheezing. Cardiovascular: pertinent positives in HPI  Gastrointestinal: Negative for abdominal pain and blood in stool. All other review of systems are negative       PHYSICAL EXAM:  Vitals:    12/08/21 2126 12/09/21 0028 12/09/21 0344 12/09/21 0732   BP: (!) 152/68 130/60 (!) 146/60 (!) 153/71   Pulse:  60 64 60   Resp:  20 22 (!) 2   Temp:  97.1 °F (36.2 °C) 97.7 °F (36.5 °C) 97.7 °F (36.5 °C)   TempSrc:  Temporal Temporal Temporal   SpO2:  95% 93% 93%   Weight:       Height:         Constitutional: Well-developed, no acute distress  Eyes: conjunctivae normal, no xanthelasma   Ears, Nose, Throat: oral mucosa moist, no cyanosis   CV: no JVD. IRIR. No murmurs, rubs, or gallops.  PMI is nondisplaced  Lungs: clear to auscultation bilaterally, normal respiratory effort without used of accessory muscles  Abdomen: soft, non-tender, bowel sounds present, no masses or hepatomegaly   Musculoskeletal: no digital clubbing, trace edema of LEs  Skin: warm, no rashes     Pertinent Labs:  CBC:   Recent Labs     12/08/21  1127   WBC 12.1*   HGB 14.7   HCT 45.2        BMP:  Recent Labs     12/08/21  1127 12/09/21  0433    137   K 4.9 4.2    101   CO2 23 23   BUN 16 15   CREATININE 0.8 0.8   CALCIUM 9.9 8.7       Pertinent Cardiac Testing:   CXR: 21 [facility]  IMPRESSION:     No acute disease identified in the lungs or mediastinum. Specifically, I   detect no evidence of pneumothorax. There has been little interval change   since the exam dated 2021. : ARIELLE     Transcribe Date/Time: May 30 2021  2:04P     Dictated by : Deb Briggs MD     This examination was interpreted and the report reviewed and   electronically signed by:   Deb rBiggs MD on May 30 2021  2:06PM  EST        Echocardiogram: [facility]  CONCLUSIONS:   - Exam indication: s/p TAVR   - The left ventricle is normal in size. Left ventricular systolic function is   normal. EF = 65 ± 5% (visual est.)   - The right ventricle is normal in size. Right ventricular systolic function is   normal.   - The left atrial cavity is severely dilated. - The right atrial cavity is dilated. - S/P transcatheter aortic valve replacement. Contreras S3 prosthetic aortic valve   (size #29). There is no aortic valve regurgitation. The peak gradient is 35 mmHg,   the mean gradient is 18 mmHg and the dimensionless valve index is 0.50. Prior AV   gradients of 28/16 mmHg. - Exam was compared with the prior [facility] echocardiographic exam performed on   2021. There is no significant change. Electronically signed by Carmita Klein MD on 2021 at 11:29:13 AM       Narrative           Echocardiography Report: Transthoracic Echo   [facility]   Date of service: 2021 9:27:19 AM   Accession #: [id number]     Ordering physician: Rubens Dorado   Indication: s/p TAVR     Technologist: Bentley Knutson [facility]   Interpreting physician: Carmita Klein MD     PATIENT:   Name: Symone Price   MRN: [id number]   : 1955   Age: 77 years   Gender: M     History of arrhythmia, valvular heart disease and diabetes mellitus. Previous cardiovascular interventions: Alternative access TAVR (2021)     Primary rhythm: atrial fib. Height: 182.90 cm BSA: 2.98 m²   Weight: 174.64 kg BMI: 52.2 kg/m²       Heart rate 79 bpm     Color Doppler was utilized to interrogate the cardiac valves assessed and spectral    Doppler was utilized to determine the flow velocities and pressure gradients   reported in this exam.     MEASUREMENTS:                            Value              Indexed    Normal   Left atrium diameter     6.1 cm (M-Mode)   Left atrial volume       165 ml (4ch A-L)   56 ml/m²   Melissa <= 34   LV ID (diastole)         4.9 cm (2D)        1.66 cm/m²   LV ID (systole)          5.4 cm (2D)        1.81 cm/m²   IVS, leaflet tips        1.3 cm (2D)   Posterior wall thickness 1.5 cm (2D)   Left ventricular mass    282 g (2D)         95 g/m²   LVOT stroke volume       159 ml             56 ml/m²   Ejection Fraction        65 % (visual est.)            EF > 52       FINDINGS:     LEFT VENTRICLE   The left ventricle is normal in size. Left ventricular systolic function is normal.   Left ventricular diastolic function was not evaluated due to AF. Wall Motion:   All scored segments are normal.         RIGHT VENTRICLE   The right ventricle is normal in size. Right ventricular systolic function is normal. RV systolic tissue Doppler velocity    is 15.0 cm/s. Estimated right ventricular systolic pressure is not reported due to an   insufficient tricuspid regurgitation signal. Estimated right atrial pressure is   not included as the IVC was not seen. LEFT ATRIUM   The left atrial cavity is severely dilated. RIGHT ATRIUM   The right atrial cavity is dilated. MITRAL VALVE   There is trace mitral valve regurgitation. There is no thickening. TRICUSPID VALVE   There is trace tricuspid valve regurgitation. There is no thickening. AORTIC VALVE   Contreras S3 prosthetic valve size #29. There is no aortic valve regurgitation. S/P   transcatheter aortic valve replacement. The peak gradient is 35 mmHg (peak   velocity = 294.2 cm/s).  The mean gradient is 18 mmHg. The LVOT mean velocity is   84.7 cm/s. The LVOT diameter is 2.9 cm. The aortic VTI is 48.2 cm. The mean   velocity in the aortic valve is 197.5 cm/s. The dimensionless valve index is 0.50.    AV area is 3.30 cm² (1.11 cm²/m²) by continuity, VTI. The LVOT stroke volume   index is 56 ml/m². PULMONIC VALVE   There is trace pulmonic valve regurgitation. There is no thickening. AORTA   The aorta is unseen or not interrogated. PULMONARY ARTERIES   The pulmonary arteries are unseen or not interrogated. PERICARDIUM   There is a trivial pericardial effusion. There is an epicardial fat pad.      Date: 21   Received From: [facility]             Cardiac Catheterization: 21 ([facility]- full report Care Everywhere)     PATIENT INFORMATION   +-------------------+     Name:        MR. Anel Taylor   MRN:         [id number]   Accession #: [id number]   :         1955   Age:         66 years   Gender:      M   Height: 72 in / 183 cm   Weight: 460.00 lb / 208.66 kg   BMI:    62.31 kg/m²   BSA:    3.04 m²     Allergies: NKDA     +------------------------------+   CLINICAL HISTORY/INDICATION(s)   +------------------------------+     Severe chronic native or prosthetic aortic stenosis, patient asymptomatic related   to valvular disease; AUC score = 4Preoperative assessment before valvular surgery;    AUC score = 7. Procedural Status: Elective     CAD Presentation: No Symptoms, No Angina     Angina Classification (within 2 weeks): No Angina     No Heart Failure     77year old White male with a history of of severe AS, incomplete LBBB, DM2,   morbid obesity, CHAITANYA gout and newly diagnosed AF who presents for [facility] prior to TAVR    evaluation.      Access Point        Sheath Size Hemostasis Method   Right Radial Artery 5F Short    Radial TR band       +-------------------+   DIAGNOSTIC FINDINGS   +-------------------+     Coronary Anatomy: Right Dominant     Injection Site(s): Coronary Artery     LMT: _ The LMT is normal.     LAD:   _ The LAD has mild luminal irregularities. LCX: _ The Circumflex is normal.     RAMUS: _ The Ramus is Absent. RCA:   _ RCA is normal.     +------------+   HEMODYNAMICS   +------------+       +---------------+   IMPRESSION/PLAN   +---------------+     Impression:   1. Right dominant system. 2. Mild non-obstructive CAD. 3. Unable to cross aortic valve with catheter due to radial/subclavian spasm. Recommended Treatment: Valve repair / replacement and Medical Therapy. Plan: TAVR vs SAVR evaluation. No indication for revascularization prior or at the    time of valve replacement.     +----------------------------------+   ADVERSE OUTCOME(s)/COMPLICATION(s)   +----------------------------------+     None      CTA of chest/abdomen/pelvis 4/8/21 ([facility])  IMPRESSION:     1.  Trileaflet aortic valve with severe cusp calcifications.  Aortic   valve orifice area = 0.8 cm2. The aortic annulus measures 3.0 x 2.6 cm; cross-sectional area = 5.9 cm2;   circumference = 8.8 cm. 2.  Normal caliber thoracic and abdominal aorta.  No acute aortic   pathology identified.  The pelvic arteries, including the common femoral   arteries are also normal in course, caliber, and contour.  The minimal   luminal caliber throughout = 0.9 cm, bilaterally. 3.  Mild right and severe left atrial enlargement.  Mild mitral annular   calcification.            : PSCB     Transcribe Date/Time: Apr 8 2021  3:38P     Dictated by : Myke Rosenthal MD     This examination was interpreted and the report reviewed and   electronically signed by:   Myke Rosenthal MD on Apr 8 2021  5:43PM  EST      Date: 04/08/21   [facility] [place] [facility]Dr Lisa Cornelius)                  Rsapvhffl86/9/2021: Atrial fibrillation rate 60 bpm.     ECG 12/9/2021: Atrial fibrillation rate 64 bpm, QRS 96ms QTc 445ms      I have independently reviewed all of the ECGs and rhythm strips per above. I have personally reviewed the laboratory, cardiac diagnostic and radiographic testing as outlined above. I have reviewed previous records noted in [address] [facility]. Impression:      1. Persistent atrial fbrillation  - Documented AF per EKG at [facility] since 4/12/21 before TAVR 5/26/21  - Asymptomatic.   - [id number] score: 3  - On Eliquis 5 mg BID for stroke risk reduction.  - On Atenolol 50 mg QD for rate control.  - TTE 7/16/21 ([facility]): Left ventricular  EF = 65 ± 5% (visual est.) The left atrial cavity is severely dilated and the right atrial cavity is dilated. - Had an extensive discussion regarding options between rate and rhythm control and the patient opts for rhythm control.  - For rhythm control we have chosen to utilize cardioversion with the addition of Tikosyn therapy. He is not a good candidate for ablation.  - QTc today 445ms CrCl   - Re-education on importance of well controlled HTN (goal BP < 130/80), adequate weight control (goal BMI of < 27), physical activity consisting of moderate cardiopulmonary exercise up to a goal of 250 min/wk, daily compliance with CPAP in treating sleep apnea, smoking cessation and limited ETOH intake.      2. Left anterior fascicular block     3. Aortic valve disease  - S/p TAVR 5/26/21 ([facility])  - TTE 7/16/21 ([facility]) - S/P transcatheter aortic valve replacement. Contreras S3 prosthetic aortic valve   (size #29). There is no aortic valve regurgitation. The peak gradient is 35 mmHg,   the mean gradient is 18 mmHg and the dimensionless valve index is 0.50. Prior AV   gradients of 28/16 mmHg  - Follows with Dr Dixie Kam.     4. CHAITANYA  - Compliant with BiPAP.     5. Morbid obesity  - Encouraged aggressive weight loss. - Body mass index is 60.57 kg/m².       6. Diabetes mellitus  - On Metformin  - Management per PCP      7. Hypertension  - Elevated   - On Amlodipine, Atenolol and Lisinopril     8. ETOH usage     9. Prior tobacco usage     10. Syncope  - Suspected vasovagal from history. 11. Hypomagnesemia   - 1.4 today replaced with 4gm     Recommendations:    1. Continue Tikosyn 500mcg Q 12 hours. 2. ECG 2 hrs after each dose of Tikosyn. 3. Daily Mg and BMP. Keep potassium around 4 and magnesium around 2.  4. Telemetry monitor. 5. Continue Atenolol dose 25mg daily. 6. DCCV in AM.   7. Plan for discharge on Saturday morning after the 6th dose. 8. Start MagOx 400mg daily. Thank you for allowing me to participate in your patient's care. Discussed with Dr. Delfin Nageotte. Electronically signed by HELENA Lyon CNP on 12/9/2021 at 12:49 PM   [address] [facility]    Attending Physician's Statement    Patient seen with the ANP. Agree with the findings, assessment and plan. Management plan was discussed. I have personally interviewed the patient, independently performed a focused cardiac examination, reviewed the pertinent laboratory and diagnostic testing with the patient and directly participated in the medical decision-making as noted above with the following additions:     Feeling well. Remains in AFL with HR 60-70 bpm. QTc 445 msec this AM    Physical exam showed no JVD, IRIR, no murmur, clear lungs bilaterally, no edema    Continue Tikosyn 500 mcg every 12 hours. Continue Eliquis 5 mg bid. NPO after midnight. Plan for DC-cardioversion tomorrow. Hold Atenolol in AM. Keep potassium at 4 and magnesium at 2.     Alena Weaver MD  Cardiac Electrophysiology  [facility]  [facility]  5:09 PM  12/9/2021

## 2021-12-10 LAB
ANION GAP SERPL CALCULATED.3IONS-SCNC: 12 MMOL/L (ref 7–16)
BUN BLDV-MCNC: 18 MG/DL (ref 6–23)
CALCIUM SERPL-MCNC: 8.6 MG/DL (ref 8.6–10.2)
CHLORIDE BLD-SCNC: 103 MMOL/L (ref 98–107)
CO2: 24 MMOL/L (ref 22–29)
CREAT SERPL-MCNC: 1 MG/DL (ref 0.7–1.2)
EKG ATRIAL RATE: 147 BPM
EKG ATRIAL RATE: 65 BPM
EKG P AXIS: 68 DEGREES
EKG P-R INTERVAL: 222 MS
EKG Q-T INTERVAL: 420 MS
EKG Q-T INTERVAL: 450 MS
EKG QRS DURATION: 100 MS
EKG QRS DURATION: 94 MS
EKG QTC CALCULATION (BAZETT): 459 MS
EKG QTC CALCULATION (BAZETT): 468 MS
EKG R AXIS: -37 DEGREES
EKG R AXIS: -50 DEGREES
EKG T AXIS: 29 DEGREES
EKG T AXIS: 34 DEGREES
EKG VENTRICULAR RATE: 65 BPM
EKG VENTRICULAR RATE: 72 BPM
GFR AFRICAN AMERICAN: >60
GFR NON-AFRICAN AMERICAN: >60 ML/MIN/1.73
GLUCOSE BLD-MCNC: 185 MG/DL (ref 74–99)
MAGNESIUM: 1.6 MG/DL (ref 1.6–2.6)
POTASSIUM SERPL-SCNC: 4.9 MMOL/L (ref 3.5–5)
SODIUM BLD-SCNC: 139 MMOL/L (ref 132–146)

## 2021-12-10 PROCEDURE — 36415 COLL VENOUS BLD VENIPUNCTURE: CPT

## 2021-12-10 PROCEDURE — 6360000002 HC RX W HCPCS: Performed by: NURSE PRACTITIONER

## 2021-12-10 PROCEDURE — 83735 ASSAY OF MAGNESIUM: CPT

## 2021-12-10 PROCEDURE — 80048 BASIC METABOLIC PNL TOTAL CA: CPT

## 2021-12-10 PROCEDURE — 2580000003 HC RX 258: Performed by: INTERNAL MEDICINE

## 2021-12-10 PROCEDURE — 6370000000 HC RX 637 (ALT 250 FOR IP): Performed by: NURSE PRACTITIONER

## 2021-12-10 PROCEDURE — 93010 ELECTROCARDIOGRAM REPORT: CPT | Performed by: INTERNAL MEDICINE

## 2021-12-10 PROCEDURE — 93005 ELECTROCARDIOGRAM TRACING: CPT | Performed by: INTERNAL MEDICINE

## 2021-12-10 PROCEDURE — 6370000000 HC RX 637 (ALT 250 FOR IP): Performed by: INTERNAL MEDICINE

## 2021-12-10 PROCEDURE — 2140000000 HC CCU INTERMEDIATE R&B

## 2021-12-10 PROCEDURE — 99233 SBSQ HOSP IP/OBS HIGH 50: CPT | Performed by: INTERNAL MEDICINE

## 2021-12-10 RX ORDER — MAGNESIUM SULFATE IN WATER 40 MG/ML
2000 INJECTION, SOLUTION INTRAVENOUS ONCE
Status: COMPLETED | OUTPATIENT
Start: 2021-12-10 | End: 2021-12-10

## 2021-12-10 RX ADMIN — Medication 10 ML: at 11:50

## 2021-12-10 RX ADMIN — APIXABAN 5 MG: 5 TABLET, FILM COATED ORAL at 20:14

## 2021-12-10 RX ADMIN — METFORMIN HYDROCHLORIDE 500 MG: 500 TABLET ORAL at 17:13

## 2021-12-10 RX ADMIN — OXYCODONE HYDROCHLORIDE AND ACETAMINOPHEN 500 MG: 500 TABLET ORAL at 20:14

## 2021-12-10 RX ADMIN — FERROUS SULFATE TAB 325 MG (65 MG ELEMENTAL FE) 325 MG: 325 (65 FE) TAB at 17:13

## 2021-12-10 RX ADMIN — DOFETILIDE 500 MCG: 0.25 CAPSULE ORAL at 05:54

## 2021-12-10 RX ADMIN — Medication 1000 UNITS: at 20:14

## 2021-12-10 RX ADMIN — ASPIRIN 81 MG: 81 TABLET, COATED ORAL at 09:09

## 2021-12-10 RX ADMIN — MAGNESIUM SULFATE HEPTAHYDRATE 2000 MG: 40 INJECTION, SOLUTION INTRAVENOUS at 09:09

## 2021-12-10 RX ADMIN — APIXABAN 5 MG: 5 TABLET, FILM COATED ORAL at 09:08

## 2021-12-10 RX ADMIN — OXYCODONE HYDROCHLORIDE AND ACETAMINOPHEN 500 MG: 500 TABLET ORAL at 09:09

## 2021-12-10 RX ADMIN — Medication 400 MG: at 20:14

## 2021-12-10 RX ADMIN — ALLOPURINOL 100 MG: 100 TABLET ORAL at 09:08

## 2021-12-10 RX ADMIN — METFORMIN HYDROCHLORIDE 500 MG: 500 TABLET ORAL at 09:08

## 2021-12-10 RX ADMIN — FERROUS SULFATE TAB 325 MG (65 MG ELEMENTAL FE) 325 MG: 325 (65 FE) TAB at 09:08

## 2021-12-10 RX ADMIN — DOFETILIDE 500 MCG: 0.25 CAPSULE ORAL at 18:00

## 2021-12-10 RX ADMIN — Medication 10 ML: at 20:14

## 2021-12-10 RX ADMIN — Medication 400 MG: at 09:08

## 2021-12-10 RX ADMIN — AMLODIPINE BESYLATE 10 MG: 10 TABLET ORAL at 09:08

## 2021-12-10 RX ADMIN — ATORVASTATIN CALCIUM 20 MG: 20 TABLET, FILM COATED ORAL at 09:08

## 2021-12-10 ASSESSMENT — PAIN SCALES - GENERAL
PAINLEVEL_OUTOF10: 0
PAINLEVEL_OUTOF10: 0

## 2021-12-10 NOTE — CARE COORDINATION
Tikosyn po 500 mcg twice a day. Met with pt in room. Anticipate discharge tomorrow. Discharge plan remains to home with no needs.  Sw/cm will follow

## 2021-12-10 NOTE — PLAN OF CARE
Problem: Falls - Risk of:  Goal: Absence of physical injury  Description: Absence of physical injury  Outcome: Met This Shift     Problem: Skin Integrity:  Goal: Will show no infection signs and symptoms  Description: Will show no infection signs and symptoms  Outcome: Met This Shift  Goal: Absence of new skin breakdown  Description: Absence of new skin breakdown  Outcome: Met This Shift

## 2021-12-10 NOTE — PLAN OF CARE
Problem: Falls - Risk of:  Goal: Absence of physical injury  Description: Absence of physical injury  12/10/2021 1103 by Yennifer Her RN  Outcome: Met This Shift  12/10/2021 0755 by Mini Mcgregor RN  Outcome: Met This Shift     Problem: Skin Integrity:  Goal: Will show no infection signs and symptoms  Description: Will show no infection signs and symptoms  12/10/2021 1103 by Yennifer Her RN  Outcome: Met This Shift  12/10/2021 0755 by Mini Mcgregor RN  Outcome: Met This Shift     Problem: Skin Integrity:  Goal: Absence of new skin breakdown  Description: Absence of new skin breakdown  12/10/2021 1103 by Yennifer Her RN  Outcome: Met This Shift  12/10/2021 0755 by Mini Mcgregor RN  Outcome: Met This Shift

## 2021-12-10 NOTE — PROGRESS NOTES
DC-cardioversion. He reported chronic dyspnea on exertion. The patient denies any chest pain, palpitations, dizziness, syncope, orthopnea or paroxysmal nocturnal dyspnea. He presents today in AF with CVR. 12/09/21: Blanca Arellano continues in atrial fibrillation with a stable CrCl and  QTc, he is agreeable to DCCV in the AM.     12/10/21: Blanca Arellano converted to sinus today, his QTc remains stable on 500mcg of Tikosyn. Today we discussed discharge pharmacy and plan to send his Tikosyn to the [facility] in [place] on the day of discharge as his preferred pharmacy does not carry Tikosyn and he does not want to use the hospital pharmacy.      Patient Active Problem List   Diagnosis    Primary osteoarthritis of left hip    Primary osteoarthritis of left hip    Instability of right hip joint    Diverticulosis    Acute blood loss anemia    Diabetes mellitus type 2, controlled ([address]    Acute respiratory failure with hypoxia (HCC)    Viral pneumonia    Morbid obesity with BMI of 50.0-59.9, adult (HCC)    Bacteremia    Transaminitis    CHAITANYA (obstructive sleep apnea)    Atelectasis    Chronic atrial fibrillation ([address]    Discharge planning issues    Disorder of aorta (HCC)    Dyspnea on exertion    Electrocardiogram abnormal    Aortic valve regurgitation    Morbid obesity (HCC)    On home O2    Postoperative hypertension    Stomach ulcer    Tobacco user    Persistent atrial fibrillation (HCC)         Scheduled Medications:   magnesium oxide  400 mg Oral BID    sodium chloride flush  5-40 mL IntraVENous 2 times per day    allopurinol  100 mg Oral QAM    Vitamin D  1,000 Units Oral Nightly    metFORMIN  500 mg Oral BID WC    ferrous sulfate  325 mg Oral BID WC    amLODIPine  10 mg Oral Daily    apixaban  5 mg Oral BID    [Held by provider] atenolol  25 mg Oral Daily    atorvastatin  20 mg Oral Daily    aspirin  81 mg Oral Daily    ascorbic acid  500 mg Oral BID    dofetilide  500 mcg Oral 2 times per day       Infusion Medications:   sodium chloride         PRN Medications:  sodium chloride flush, sodium chloride, polyethylene glycol, acetaminophen **OR** acetaminophen    No Known Allergies    Wt Readings from Last 3 Encounters:   12/08/21 (!) 446 lb 9.6 oz (202.6 kg)   11/03/21 (!) 435 lb 9.6 oz (197.6 kg)   08/05/21 (!) 420 lb 1.6 oz (190.6 kg)     Temp Readings from Last 3 Encounters:   12/10/21 97.2 °F (36.2 °C) (Core)   08/05/21 98.5 °F (36.9 °C)   01/19/21 97.6 °F (36.4 °C)     BP Readings from Last 3 Encounters:   12/10/21 (!) 107/56   11/03/21 (!) 152/84   08/05/21 138/88     Pulse Readings from Last 3 Encounters:   12/10/21 71   11/03/21 70   08/05/21 78         Intake/Output Summary (Last 24 hours) at 12/10/2021 1149  Last data filed at 12/10/2021 0944  Gross per 24 hour   Intake 480 ml   Output 3050 ml   Net -2570 ml       ROS:   Constitutional: Negative for fever, activity change and appetite change. HENT: Negative for epistaxis. Eyes: Negative for diploplia, blurred vision. Respiratory: Negative for cough, chest tightness, shortness of breath and wheezing. Cardiovascular: pertinent positives in HPI  Gastrointestinal: Negative for abdominal pain and blood in stool. All other review of systems are negative       PHYSICAL EXAM:  Vitals:    12/09/21 2336 12/10/21 0720 12/10/21 0800 12/10/21 1110   BP: (!) 120/59 (!) 155/67  (!) 107/56   Pulse: 69 64 69 71   Resp: 16 18  18   Temp: 97.5 °F (36.4 °C) 96.4 °F (35.8 °C)  97.2 °F (36.2 °C)   TempSrc: Temporal Temporal  Core   SpO2: 94% 99%     Weight:       Height:         Constitutional: Well-developed, no acute distress  Eyes: conjunctivae normal, no xanthelasma   Ears, Nose, Throat: oral mucosa moist, no cyanosis   CV: no JVD. RRR. No murmurs, rubs, or gallops.  PMI is nondisplaced  Lungs: clear to auscultation bilaterally, normal respiratory effort without used of accessory muscles  Abdomen: soft, non-tender, bowel sounds present, no masses or hepatomegaly   Musculoskeletal: no digital clubbing, trace edema of LEs  Skin: warm, no rashes     Pertinent Labs:  CBC:   Recent Labs     12/08/21  1127   WBC 12.1*   HGB 14.7   HCT 45.2        BMP:  Recent Labs     12/08/21  1127 12/09/21  0433 12/10/21  0658    137 139   K 4.9 4.2 4.9    101 103   CO2 23 23 24   BUN 16 15 18   CREATININE 0.8 0.8 1.0   CALCIUM 9.9 8.7 8.6       Pertinent Cardiac Testing:   CXR: 5/30/21 [facility]  IMPRESSION:     No acute disease identified in the lungs or mediastinum. Specifically, I   detect no evidence of pneumothorax. There has been little interval change   since the exam dated 05/29/2021. : ARIELLE     Transcribe Date/Time: May 30 2021  2:04P     Dictated by : Ragini Solares MD     This examination was interpreted and the report reviewed and   electronically signed by:   Ragnii Solares MD on May 30 2021  2:06PM  EST        Echocardiogram: [facility]  CONCLUSIONS:   - Exam indication: s/p TAVR   - The left ventricle is normal in size. Left ventricular systolic function is   normal. EF = 65 ± 5% (visual est.)   - The right ventricle is normal in size. Right ventricular systolic function is   normal.   - The left atrial cavity is severely dilated. - The right atrial cavity is dilated. - S/P transcatheter aortic valve replacement. Contreras S3 prosthetic aortic valve   (size #29). There is no aortic valve regurgitation. The peak gradient is 35 mmHg,   the mean gradient is 18 mmHg and the dimensionless valve index is 0.50. Prior AV   gradients of 28/16 mmHg. - Exam was compared with the prior [facility] echocardiographic exam performed on   05/31/2021. There is no significant change.        Electronically signed by Aliza Triplett MD on 7/16/2021 at 11:29:13 AM       Narrative           Echocardiography Report: Transthoracic Echo   [address] [facility] [address]   Date of service: 7/16/2021 9:27:19 AM   Accession #: [id number]     Ordering physician: Padilla Gramajo Indication: s/p TAVR     Technologist: Lemuel Hearn [facility]   Interpreting physician: Jaylen He MD     PATIENT:   Name: iNck Burgos   MRN: [id number]   : 1955   Age: 77 years   Gender: M     History of arrhythmia, valvular heart disease and diabetes mellitus. Previous cardiovascular interventions: Alternative access TAVR (2021)     Primary rhythm: atrial fib. Height: 182.90 cm BSA: 2.98 m²   Weight: 174.64 kg BMI: 52.2 kg/m²       Heart rate 79 bpm     Color Doppler was utilized to interrogate the cardiac valves assessed and spectral    Doppler was utilized to determine the flow velocities and pressure gradients   reported in this exam.     MEASUREMENTS:                            Value              Indexed    Normal   Left atrium diameter     6.1 cm (M-Mode)   Left atrial volume       165 ml (4ch A-L)   56 ml/m²   Melissa <= 34   LV ID (diastole)         4.9 cm (2D)        1.66 cm/m²   LV ID (systole)          5.4 cm (2D)        1.81 cm/m²   IVS, leaflet tips        1.3 cm (2D)   Posterior wall thickness 1.5 cm (2D)   Left ventricular mass    282 g (2D)         95 g/m²   LVOT stroke volume       159 ml             56 ml/m²   Ejection Fraction        65 % (visual est.)            EF > 52       FINDINGS:     LEFT VENTRICLE   The left ventricle is normal in size. Left ventricular systolic function is normal.   Left ventricular diastolic function was not evaluated due to AF. Wall Motion:   All scored segments are normal.         RIGHT VENTRICLE   The right ventricle is normal in size. Right ventricular systolic function is normal. RV systolic tissue Doppler velocity    is 15.0 cm/s. Estimated right ventricular systolic pressure is not reported due to an   insufficient tricuspid regurgitation signal. Estimated right atrial pressure is   not included as the IVC was not seen. LEFT ATRIUM   The left atrial cavity is severely dilated.      RIGHT ATRIUM   The right atrial cavity is dilated. MITRAL VALVE   There is trace mitral valve regurgitation. There is no thickening. TRICUSPID VALVE   There is trace tricuspid valve regurgitation. There is no thickening. AORTIC VALVE   Contreras S3 prosthetic valve size #29. There is no aortic valve regurgitation. S/P   transcatheter aortic valve replacement. The peak gradient is 35 mmHg (peak   velocity = 294.2 cm/s). The mean gradient is 18 mmHg. The LVOT mean velocity is   84.7 cm/s. The LVOT diameter is 2.9 cm. The aortic VTI is 48.2 cm. The mean   velocity in the aortic valve is 197.5 cm/s. The dimensionless valve index is 0.50.    AV area is 3.30 cm² (1.11 cm²/m²) by continuity, VTI. The LVOT stroke volume   index is 56 ml/m². PULMONIC VALVE   There is trace pulmonic valve regurgitation. There is no thickening. AORTA   The aorta is unseen or not interrogated. PULMONARY ARTERIES   The pulmonary arteries are unseen or not interrogated. PERICARDIUM   There is a trivial pericardial effusion. There is an epicardial fat pad.      Date: 21   Received From: [facility]             Cardiac Catheterization: 21 ([facility]- full report Care Everywhere)     PATIENT INFORMATION   +-------------------+     Name:        MR. Ammy Noriega   MRN:         [id number]   Accession #: [id number]   :         1955   Age:         66 years   Gender:      M   Height: 72 in / 183 cm   Weight: 460.00 lb / 208.66 kg   BMI:    62.31 kg/m²   BSA:    3.04 m²     Allergies: NKDA     +------------------------------+   CLINICAL HISTORY/INDICATION(s)   +------------------------------+     Severe chronic native or prosthetic aortic stenosis, patient asymptomatic related   to valvular disease; AUC score = 4Preoperative assessment before valvular surgery;    AUC score = 7.      Procedural Status: Elective     CAD Presentation: No Symptoms, No Angina     Angina Classification (within 2 weeks): No Angina     No Heart Failure     77year old White male with a history of of severe AS, incomplete LBBB, DM2,   morbid obesity, CHAITANYA gout and newly diagnosed AF who presents for [facility] prior to TAVR    evaluation. Access Point        Sheath Size Hemostasis Method   Right Radial Artery 5F Short    Radial TR band       +-------------------+   DIAGNOSTIC FINDINGS   +-------------------+     Coronary Anatomy: Right Dominant     Injection Site(s): Coronary Artery     LMT: _ The LMT is normal.     LAD:   _ The LAD has mild luminal irregularities. LCX: _ The Circumflex is normal.     RAMUS: _ The Ramus is Absent. RCA:   _ RCA is normal.     +------------+   HEMODYNAMICS   +------------+       +---------------+   IMPRESSION/PLAN   +---------------+     Impression:   1. Right dominant system. 2. Mild non-obstructive CAD. 3. Unable to cross aortic valve with catheter due to radial/subclavian spasm. Recommended Treatment: Valve repair / replacement and Medical Therapy. Plan: TAVR vs SAVR evaluation. No indication for revascularization prior or at the    time of valve replacement.     +----------------------------------+   ADVERSE OUTCOME(s)/COMPLICATION(s)   +----------------------------------+     None      CTA of chest/abdomen/pelvis 4/8/21 ([facility])  IMPRESSION:     1.  Trileaflet aortic valve with severe cusp calcifications.  Aortic   valve orifice area = 0.8 cm2. The aortic annulus measures 3.0 x 2.6 cm; cross-sectional area = 5.9 cm2;   circumference = 8.8 cm. 2.  Normal caliber thoracic and abdominal aorta.  No acute aortic   pathology identified.  The pelvic arteries, including the common femoral   arteries are also normal in course, caliber, and contour.  The minimal   luminal caliber throughout = 0.9 cm, bilaterally. 3.  Mild right and severe left atrial enlargement.  Mild mitral annular   calcification.            : ARIELLE     Transcribe Date/Time: Apr 8 2021  3:38P     Dictated by : Kai Grimaldo MD     This examination was interpreted and the report reviewed and   electronically signed by:   Jamee Pham MD on Apr 8 2021  5:43PM  EST      Date: 04/08/21   Received From: [address] [facility] [place] test/Cardiac MRI (Dr Unique Valenzuela)                  [place]17/10/2021: Sinus rate 60 bpm.     ECG 12/10/2021: Sinus rhythm rate 65 bpm,  ms QTc 468 ms      I have independently reviewed all of the ECGs and rhythm strips per above. I have personally reviewed the laboratory, cardiac diagnostic and radiographic testing as outlined above. I have reviewed previous records noted in [address]. Impression:      1. Persistent atrial fbrillation  - Documented AF per EKG at [facility] since 4/12/21 before TAVR 5/26/21  - Asymptomatic.   - [id number] score: 3  - On Eliquis 5 mg BID for stroke risk reduction.  - On Atenolol 50 mg QD for rate control.  - TTE 7/16/21 ([facility]): Left ventricular  EF = 65 ± 5% (visual est.) The left atrial cavity is severely dilated and the right atrial cavity is dilated. - Had an extensive discussion regarding options between rate and rhythm control and the patient opts for rhythm control.  - For rhythm control we have chosen to utilize cardioversion with the addition of Tikosyn therapy. He is not a good candidate for ablation.  - He spontaneously converted post the 4th dose to sinus.   - QTc today 468ms CrCl 79.77 mL/min based on a creat of 1.0   - Re-education on importance of well controlled HTN (goal BP < 130/80), adequate weight control (goal BMI of < 27), physical activity consisting of moderate cardiopulmonary exercise up to a goal of 250 min/wk, daily compliance with CPAP in treating sleep apnea, smoking cessation and limited ETOH intake.      2. Left anterior fascicular block     3. Aortic valve disease  - S/p TAVR 5/26/21 ([facility])  - TTE 7/16/21 ([facility]) - S/P transcatheter aortic valve replacement. Contreras S3 prosthetic aortic valve   (size #29).  There is no aortic valve regurgitation. The peak gradient is 35 mmHg,   the mean gradient is 18 mmHg and the dimensionless valve index is 0.50. Prior AV   gradients of 28/16 mmHg  - Follows with Dr Carey Wood.     4. CHAITANYA  - Compliant with BiPAP.     5. Morbid obesity  - Encouraged aggressive weight loss. - Body mass index is 60.57 kg/m².       6. Diabetes mellitus  - On Metformin  - Management per PCP      7. Hypertension  - Elevated   - On Amlodipine, Atenolol and Lisinopril     8. ETOH usage     9. Prior tobacco usage     10. Syncope  - Suspected vasovagal from history. 11. Hypomagnesemia   - 1.6 today replaced with 2gm     Recommendations:    1. Continue Tikosyn 500mcg Q 12 hours. 2. ECG 2 hrs after each dose of Tikosyn. 3. Daily Mg and BMP. Keep potassium around 4 and magnesium around 2 replace today's mag with IV and start PO.  4. Telemetry monitor. 5. Hold Atenolol dose 25mg daily. 6. Plan for discharge on Saturday morning after the 6th dose. Thank you for allowing me to participate in your patient's care. Discussed with . Electronically signed by HELENA Pisano CNP on 12/10/2021 at 11:49 AM   [address] [place] [address] Physicians                Addendum    I personally saw, examined and provided care for the patient. Radiographs, labs and medication list were reviewed by me independently. The case was discussed in detail and plans for care were established. Review of Nurse Practitioner documentation was conducted and revisions were made as appropriate. I agree with the above documented exam, problem list and plan of care. - Estimated Creatinine Clearance: 131 mL/min (based on SCr of 1 mg/dL). - QTC stable    I have spent a total of 35 minutes with the patient and his/her family reviewing the above stated recommendations.  A total of >50% of that time involved face-to-face time providing counseling and or coordination of care with the other providers.           Keerthi Orlando M.D  [facility]

## 2021-12-10 NOTE — PLAN OF CARE
Problem: Falls - Risk of:  Goal: Will remain free from falls  Description: Will remain free from falls  12/10/2021 0755 by Maribel Jett RN  Outcome: Met This Shift     Problem: Falls - Risk of:  Goal: Absence of physical injury  Description: Absence of physical injury  12/10/2021 1653 by Tracy Pereira RN  Outcome: Met This Shift

## 2021-12-11 VITALS
TEMPERATURE: 97.5 F | HEIGHT: 72 IN | WEIGHT: 315 LBS | RESPIRATION RATE: 20 BRPM | OXYGEN SATURATION: 92 % | HEART RATE: 69 BPM | BODY MASS INDEX: 42.66 KG/M2 | SYSTOLIC BLOOD PRESSURE: 147 MMHG | DIASTOLIC BLOOD PRESSURE: 70 MMHG

## 2021-12-11 LAB
ANION GAP SERPL CALCULATED.3IONS-SCNC: 11 MMOL/L (ref 7–16)
BUN BLDV-MCNC: 15 MG/DL (ref 6–23)
CALCIUM SERPL-MCNC: 8.8 MG/DL (ref 8.6–10.2)
CHLORIDE BLD-SCNC: 99 MMOL/L (ref 98–107)
CO2: 24 MMOL/L (ref 22–29)
CREAT SERPL-MCNC: 0.8 MG/DL (ref 0.7–1.2)
GFR AFRICAN AMERICAN: >60
GFR NON-AFRICAN AMERICAN: >60 ML/MIN/1.73
GLUCOSE BLD-MCNC: 156 MG/DL (ref 74–99)
MAGNESIUM: 1.7 MG/DL (ref 1.6–2.6)
POTASSIUM SERPL-SCNC: 4.4 MMOL/L (ref 3.5–5)
SODIUM BLD-SCNC: 134 MMOL/L (ref 132–146)

## 2021-12-11 PROCEDURE — 6370000000 HC RX 637 (ALT 250 FOR IP): Performed by: NURSE PRACTITIONER

## 2021-12-11 PROCEDURE — 2580000003 HC RX 258: Performed by: INTERNAL MEDICINE

## 2021-12-11 PROCEDURE — 83735 ASSAY OF MAGNESIUM: CPT

## 2021-12-11 PROCEDURE — 99239 HOSP IP/OBS DSCHRG MGMT >30: CPT | Performed by: INTERNAL MEDICINE

## 2021-12-11 PROCEDURE — 6370000000 HC RX 637 (ALT 250 FOR IP): Performed by: INTERNAL MEDICINE

## 2021-12-11 PROCEDURE — 80048 BASIC METABOLIC PNL TOTAL CA: CPT

## 2021-12-11 PROCEDURE — 36415 COLL VENOUS BLD VENIPUNCTURE: CPT

## 2021-12-11 RX ORDER — MAGNESIUM OXIDE 400 MG/1
400 TABLET ORAL DAILY
Qty: 30 TABLET | Refills: 1 | Status: SHIPPED | OUTPATIENT
Start: 2021-12-11

## 2021-12-11 RX ORDER — DOFETILIDE 0.5 MG/1
500 CAPSULE ORAL EVERY 12 HOURS SCHEDULED
Qty: 60 CAPSULE | Refills: 3 | Status: SHIPPED | OUTPATIENT
Start: 2021-12-11 | End: 2022-04-18

## 2021-12-11 RX ADMIN — ATORVASTATIN CALCIUM 20 MG: 20 TABLET, FILM COATED ORAL at 08:00

## 2021-12-11 RX ADMIN — Medication 10 ML: at 08:00

## 2021-12-11 RX ADMIN — OXYCODONE HYDROCHLORIDE AND ACETAMINOPHEN 500 MG: 500 TABLET ORAL at 08:00

## 2021-12-11 RX ADMIN — APIXABAN 5 MG: 5 TABLET, FILM COATED ORAL at 08:00

## 2021-12-11 RX ADMIN — Medication 400 MG: at 08:00

## 2021-12-11 RX ADMIN — AMLODIPINE BESYLATE 10 MG: 10 TABLET ORAL at 08:00

## 2021-12-11 RX ADMIN — METFORMIN HYDROCHLORIDE 500 MG: 500 TABLET ORAL at 08:00

## 2021-12-11 RX ADMIN — ASPIRIN 81 MG: 81 TABLET, COATED ORAL at 08:00

## 2021-12-11 RX ADMIN — DOFETILIDE 500 MCG: 0.25 CAPSULE ORAL at 06:05

## 2021-12-11 RX ADMIN — ALLOPURINOL 100 MG: 100 TABLET ORAL at 08:00

## 2021-12-11 RX ADMIN — FERROUS SULFATE TAB 325 MG (65 MG ELEMENTAL FE) 325 MG: 325 (65 FE) TAB at 08:00

## 2021-12-11 ASSESSMENT — PAIN SCALES - GENERAL
PAINLEVEL_OUTOF10: 0
PAINLEVEL_OUTOF10: 0

## 2021-12-11 NOTE — DISCHARGE SUMMARY
[address] [facility]  Discharge Summary  Patient: April Braden  Medical Record Number: [id number]  Date of Procedure:  12/11/2021  Operating Electrophysiologist: April Cardoza MD  Referring Physician: Shahriar Hawk MD     Admission Date:12/8/2021      Discharge Date:  12/11/21  The patient presents to cardiac electrophysiology clinic for consultation of persistent AF, s/p TAVR  5/26/21 ([facility]). His PMHx is noted below and includes: VHD: mod/severe AS-s/p TAVR; mild TR; trace MR; persistent AF on [address] [place] [address] Road; DM; CHAITANYA; morbid obesity; BERNAL. Current medications: amlodipine, apixaban, tenormin, lisinopril, metformin, allopurinol, ferrous sulfate, pantoprazole, and vitamins. He follows with Dr Razia Rollins and [facility] for his history of VHD and underwent TAVR procedure on 5/26/21. Since the TAVR procedure he has been in AF on [facility]. A TTE on  7/16/21 ([facility]) shows an LVEF 65%, severely dilated LA and dilated RA (see below/Care Everywhere). A cardiac MRI showed no evidence of scar. [facility] recommended an EP referral and the patient opted to follow locally. The patient reports he did have syncopal episode after drinking at a bar, he does report consuming ETOH at that time. He presents today in AF with CVR. He reports he is unable to tell when he is in AF and remains asymptomatic.  In terms of rhythm control we discussed about cardioversion with the addition of AAD therapy    He underwent Tikosyn load without any complications      Patient Active Problem List   Diagnosis    Primary osteoarthritis of left hip    Primary osteoarthritis of left hip    Instability of right hip joint    Diverticulosis    Acute blood loss anemia    Diabetes mellitus type 2, controlled ([address]    Acute respiratory failure with hypoxia ([address]    Viral pneumonia    Morbid obesity with BMI of 50.0-59.9, adult (HCC)    Bacteremia    Transaminitis    CHAITANYA (obstructive sleep apnea)    Atelectasis  Chronic atrial fibrillation ([facility]    Discharge planning issues    Disorder of aorta (HCC)    Dyspnea on exertion    Electrocardiogram abnormal    Aortic valve regurgitation    Morbid obesity (HCC)    On home O2    Postoperative hypertension    Stomach ulcer    Tobacco user    Persistent atrial fibrillation [facility])       @DISCHARGEMEDSLIST(<NOROUTINE> error)@    Hospital Course:  Dayron converted to sinus  12/10/21. He tolerated Tikosyn adequately    Procedures Performed: None      Disposition: The patient was discharged to home in stable condition on the above medications. Clinical follow-up in the office in 1 week.       Nicolas Vasquez MD  Cardiac Electrophysiology  [facility]) Physicians  [facility] [place] [place] [facility]  10:30 AM  12/11/2021

## 2021-12-16 LAB
EKG ATRIAL RATE: 67 BPM
EKG ATRIAL RATE: 72 BPM
EKG P AXIS: 58 DEGREES
EKG P AXIS: 62 DEGREES
EKG P-R INTERVAL: 226 MS
EKG P-R INTERVAL: 228 MS
EKG Q-T INTERVAL: 428 MS
EKG Q-T INTERVAL: 434 MS
EKG QRS DURATION: 100 MS
EKG QRS DURATION: 106 MS
EKG QTC CALCULATION (BAZETT): 458 MS
EKG QTC CALCULATION (BAZETT): 468 MS
EKG R AXIS: -35 DEGREES
EKG R AXIS: -37 DEGREES
EKG T AXIS: 39 DEGREES
EKG T AXIS: 48 DEGREES
EKG VENTRICULAR RATE: 67 BPM
EKG VENTRICULAR RATE: 72 BPM

## 2021-12-17 ENCOUNTER — TELEPHONE (OUTPATIENT)
Dept: NON INVASIVE DIAGNOSTICS | Age: 66
End: 2021-12-17

## 2021-12-17 ENCOUNTER — NURSE ONLY (OUTPATIENT)
Dept: NON INVASIVE DIAGNOSTICS | Age: 66
End: 2021-12-17
Payer: MEDICARE

## 2021-12-17 DIAGNOSIS — I48.20 CHRONIC ATRIAL FIBRILLATION (HCC): Primary | ICD-10-CM

## 2021-12-17 PROCEDURE — 93000 ELECTROCARDIOGRAM COMPLETE: CPT | Performed by: INTERNAL MEDICINE

## 2021-12-17 NOTE — TELEPHONE ENCOUNTER
----- Message from Giovanni Leventhal, MD sent at 12/17/2021  2:10 PM EST -----  He is back in AF. Will try repeat DC-CV in 2 to 4 weeks.  Thanks.  ----- Message -----  From: Yamila Marr MA  Sent: 12/17/2021   1:49 PM EST  To: Giovanni Leventhal, MD

## 2021-12-30 ENCOUNTER — TELEPHONE (OUTPATIENT)
Dept: CARDIAC CATH/INVASIVE PROCEDURES | Age: 66
End: 2021-12-30

## 2022-01-03 ENCOUNTER — ANESTHESIA (OUTPATIENT)
Dept: CARDIAC CATH/INVASIVE PROCEDURES | Age: 67
End: 2022-01-03

## 2022-01-03 ENCOUNTER — HOSPITAL ENCOUNTER (OUTPATIENT)
Dept: CARDIAC CATH/INVASIVE PROCEDURES | Age: 67
Discharge: HOME | End: 2022-01-03
Payer: MEDICARE

## 2022-01-03 ENCOUNTER — ANESTHESIA EVENT (OUTPATIENT)
Dept: CARDIAC CATH/INVASIVE PROCEDURES | Age: 67
End: 2022-01-03

## 2022-01-03 VITALS
DIASTOLIC BLOOD PRESSURE: 96 MMHG | RESPIRATION RATE: 16 BRPM | OXYGEN SATURATION: 97 % | SYSTOLIC BLOOD PRESSURE: 161 MMHG

## 2022-01-03 VITALS
SYSTOLIC BLOOD PRESSURE: 159 MMHG | DIASTOLIC BLOOD PRESSURE: 96 MMHG | OXYGEN SATURATION: 97 % | HEIGHT: 72 IN | TEMPERATURE: 98.1 F | RESPIRATION RATE: 14 BRPM | BODY MASS INDEX: 42.66 KG/M2 | HEART RATE: 79 BPM | WEIGHT: 315 LBS

## 2022-01-03 LAB
ANION GAP SERPL CALCULATED.3IONS-SCNC: 16 MMOL/L (ref 7–16)
BASOPHILS ABSOLUTE: 0.09 E9/L (ref 0–0.2)
BASOPHILS RELATIVE PERCENT: 0.7 % (ref 0–2)
BUN BLDV-MCNC: 16 MG/DL (ref 6–23)
CALCIUM SERPL-MCNC: 9.6 MG/DL (ref 8.6–10.2)
CHLORIDE BLD-SCNC: 99 MMOL/L (ref 98–107)
CO2: 22 MMOL/L (ref 22–29)
CREAT SERPL-MCNC: 0.8 MG/DL (ref 0.7–1.2)
EKG ATRIAL RATE: 340 BPM
EKG ATRIAL RATE: 81 BPM
EKG P AXIS: 57 DEGREES
EKG P-R INTERVAL: 214 MS
EKG Q-T INTERVAL: 402 MS
EKG Q-T INTERVAL: 402 MS
EKG QRS DURATION: 104 MS
EKG QRS DURATION: 106 MS
EKG QTC CALCULATION (BAZETT): 452 MS
EKG QTC CALCULATION (BAZETT): 466 MS
EKG R AXIS: -50 DEGREES
EKG R AXIS: -50 DEGREES
EKG T AXIS: 31 DEGREES
EKG T AXIS: 51 DEGREES
EKG VENTRICULAR RATE: 76 BPM
EKG VENTRICULAR RATE: 81 BPM
EOSINOPHILS ABSOLUTE: 0.53 E9/L (ref 0.05–0.5)
EOSINOPHILS RELATIVE PERCENT: 4.3 % (ref 0–6)
GFR AFRICAN AMERICAN: >60
GFR NON-AFRICAN AMERICAN: >60 ML/MIN/1.73
GLUCOSE BLD-MCNC: 206 MG/DL (ref 74–99)
HCT VFR BLD CALC: 45.1 % (ref 37–54)
HEMOGLOBIN: 14.9 G/DL (ref 12.5–16.5)
IMMATURE GRANULOCYTES #: 0.08 E9/L
IMMATURE GRANULOCYTES %: 0.7 % (ref 0–5)
LYMPHOCYTES ABSOLUTE: 2.47 E9/L (ref 1.5–4)
LYMPHOCYTES RELATIVE PERCENT: 20.1 % (ref 20–42)
MAGNESIUM: 1.5 MG/DL (ref 1.6–2.6)
MCH RBC QN AUTO: 29.4 PG (ref 26–35)
MCHC RBC AUTO-ENTMCNC: 33 % (ref 32–34.5)
MCV RBC AUTO: 89.1 FL (ref 80–99.9)
MONOCYTES ABSOLUTE: 0.64 E9/L (ref 0.1–0.95)
MONOCYTES RELATIVE PERCENT: 5.2 % (ref 2–12)
NEUTROPHILS ABSOLUTE: 8.45 E9/L (ref 1.8–7.3)
NEUTROPHILS RELATIVE PERCENT: 69 % (ref 43–80)
PDW BLD-RTO: 12.8 FL (ref 11.5–15)
PLATELET # BLD: 158 E9/L (ref 130–450)
PMV BLD AUTO: 11.3 FL (ref 7–12)
POTASSIUM SERPL-SCNC: 4.9 MMOL/L (ref 3.5–5)
RBC # BLD: 5.06 E12/L (ref 3.8–5.8)
SODIUM BLD-SCNC: 137 MMOL/L (ref 132–146)
WBC # BLD: 12.3 E9/L (ref 4.5–11.5)

## 2022-01-03 PROCEDURE — 36415 COLL VENOUS BLD VENIPUNCTURE: CPT

## 2022-01-03 PROCEDURE — 80048 BASIC METABOLIC PNL TOTAL CA: CPT

## 2022-01-03 PROCEDURE — 3700000000 HC ANESTHESIA ATTENDED CARE

## 2022-01-03 PROCEDURE — 6360000002 HC RX W HCPCS: Performed by: ANESTHESIOLOGIST ASSISTANT

## 2022-01-03 PROCEDURE — 2580000003 HC RX 258: Performed by: INTERNAL MEDICINE

## 2022-01-03 PROCEDURE — 93312 ECHO TRANSESOPHAGEAL: CPT

## 2022-01-03 PROCEDURE — 92960 CARDIOVERSION ELECTRIC EXT: CPT

## 2022-01-03 PROCEDURE — 93325 DOPPLER ECHO COLOR FLOW MAPG: CPT

## 2022-01-03 PROCEDURE — 93005 ELECTROCARDIOGRAM TRACING: CPT | Performed by: INTERNAL MEDICINE

## 2022-01-03 PROCEDURE — 93320 DOPPLER ECHO COMPLETE: CPT | Performed by: INTERNAL MEDICINE

## 2022-01-03 PROCEDURE — 85025 COMPLETE CBC W/AUTO DIFF WBC: CPT

## 2022-01-03 PROCEDURE — 92960 CARDIOVERSION ELECTRIC EXT: CPT | Performed by: INTERNAL MEDICINE

## 2022-01-03 PROCEDURE — 93325 DOPPLER ECHO COLOR FLOW MAPG: CPT | Performed by: INTERNAL MEDICINE

## 2022-01-03 PROCEDURE — 83735 ASSAY OF MAGNESIUM: CPT

## 2022-01-03 PROCEDURE — 93312 ECHO TRANSESOPHAGEAL: CPT | Performed by: INTERNAL MEDICINE

## 2022-01-03 PROCEDURE — 3700000001 HC ADD 15 MINUTES (ANESTHESIA)

## 2022-01-03 PROCEDURE — 2709999900 HC NON-CHARGEABLE SUPPLY

## 2022-01-03 PROCEDURE — 93321 DOPPLER ECHO F-UP/LMTD STD: CPT

## 2022-01-03 RX ORDER — SODIUM CHLORIDE 0.9 % (FLUSH) 0.9 %
5-40 SYRINGE (ML) INJECTION PRN
Status: DISCONTINUED | OUTPATIENT
Start: 2022-01-03 | End: 2022-01-03 | Stop reason: SDUPTHER

## 2022-01-03 RX ORDER — ATENOLOL 50 MG/1
50 TABLET ORAL DAILY
Qty: 30 TABLET | Refills: 3 | Status: SHIPPED | OUTPATIENT
Start: 2022-01-03

## 2022-01-03 RX ORDER — PROPOFOL 10 MG/ML
INJECTION, EMULSION INTRAVENOUS PRN
Status: DISCONTINUED | OUTPATIENT
Start: 2022-01-03 | End: 2022-01-03 | Stop reason: SDUPTHER

## 2022-01-03 RX ORDER — SODIUM CHLORIDE 9 MG/ML
INJECTION, SOLUTION INTRAVENOUS ONCE
Status: COMPLETED | OUTPATIENT
Start: 2022-01-03 | End: 2022-01-03

## 2022-01-03 RX ORDER — SODIUM CHLORIDE 9 MG/ML
25 INJECTION, SOLUTION INTRAVENOUS PRN
Status: DISCONTINUED | OUTPATIENT
Start: 2022-01-03 | End: 2022-01-04 | Stop reason: HOSPADM

## 2022-01-03 RX ORDER — SODIUM CHLORIDE 9 MG/ML
25 INJECTION, SOLUTION INTRAVENOUS PRN
Status: DISCONTINUED | OUTPATIENT
Start: 2022-01-03 | End: 2022-01-03 | Stop reason: SDUPTHER

## 2022-01-03 RX ORDER — SODIUM CHLORIDE 0.9 % (FLUSH) 0.9 %
5-40 SYRINGE (ML) INJECTION EVERY 12 HOURS SCHEDULED
Status: DISCONTINUED | OUTPATIENT
Start: 2022-01-03 | End: 2022-01-03 | Stop reason: SDUPTHER

## 2022-01-03 RX ORDER — SODIUM CHLORIDE 0.9 % (FLUSH) 0.9 %
5-40 SYRINGE (ML) INJECTION EVERY 12 HOURS SCHEDULED
Status: DISCONTINUED | OUTPATIENT
Start: 2022-01-03 | End: 2022-01-04 | Stop reason: HOSPADM

## 2022-01-03 RX ORDER — SODIUM CHLORIDE 0.9 % (FLUSH) 0.9 %
5-40 SYRINGE (ML) INJECTION PRN
Status: DISCONTINUED | OUTPATIENT
Start: 2022-01-03 | End: 2022-01-04 | Stop reason: HOSPADM

## 2022-01-03 RX ADMIN — SODIUM CHLORIDE: 9 INJECTION, SOLUTION INTRAVENOUS at 10:42

## 2022-01-03 RX ADMIN — PROPOFOL 270 MG: 10 INJECTION, EMULSION INTRAVENOUS at 10:42

## 2022-01-03 ASSESSMENT — ENCOUNTER SYMPTOMS: SHORTNESS OF BREATH: 1

## 2022-01-03 NOTE — ANESTHESIA PRE PROCEDURE
Department of Anesthesiology  Preprocedure Note       Name:  Dian Rivera   Age:  77 y.o.  :  1955                                          MRN:  [id number]         Date:  1/3/2022      Surgeon: * Surgery not found *    Procedure: MELE/ CARDIOVERSION WITH ANESTHESIA    Medications prior to admission:   Prior to Admission medications    Medication Sig Start Date End Date Taking? Authorizing Provider   dofetilide (TIKOSYN) 500 MCG capsule Take 1 capsule by mouth every 12 hours 21  Yes Kyara Miller MD   magnesium oxide (MAG-OX) 400 MG tablet Take 1 tablet by mouth daily 21  Yes Kyara Miller MD   ascorbic acid (VITAMIN C) 500 MG tablet Take 500 mg by mouth 2 times daily   Yes Historical Provider, MD   aspirin 81 MG EC tablet Take 1 tablet every day by oral route.    Yes Historical Provider, MD   apixaban (ELIQUIS) 5 MG TABS tablet Take 5 mg by mouth 2 times daily 21 Yes Historical Provider, MD   ferrous sulfate (FE TABS) 325 (65 Fe) MG EC tablet Take 1 tablet by mouth 2 times daily 10/20/18  Yes Tati CASAS Sugar, APRN - CNP   metFORMIN (GLUCOPHAGE) 500 MG tablet Take 500 mg by mouth 2 times daily (with meals)   Yes Historical Provider, MD   Omega-3 Fatty Acids (FISH OIL) 1000 MG CAPS Take 1,000 mg by mouth every morning   Yes Historical Provider, MD   allopurinol (ZYLOPRIM) 100 MG tablet Take 100 mg by mouth every morning    Yes Historical Provider, MD   vitamin D (CHOLECALCIFEROL) 1000 UNIT TABS tablet Take 1,000 Units by mouth nightly    Yes Historical Provider, MD   amLODIPine (NORVASC) 10 MG tablet Take 10 mg by mouth daily 21   Historical Provider, MD       Current medications:    Current Outpatient Medications   Medication Sig Dispense Refill    dofetilide (TIKOSYN) 500 MCG capsule Take 1 capsule by mouth every 12 hours 60 capsule 3    magnesium oxide (MAG-OX) 400 MG tablet Take 1 tablet by mouth daily 30 tablet 1    ascorbic acid (VITAMIN C) 500 MG tablet Take 500 mg by mouth 2 times daily      aspirin 81 MG EC tablet Take 1 tablet every day by oral route.  apixaban (ELIQUIS) 5 MG TABS tablet Take 5 mg by mouth 2 times daily      ferrous sulfate (FE TABS) 325 (65 Fe) MG EC tablet Take 1 tablet by mouth 2 times daily 90 tablet 3    metFORMIN (GLUCOPHAGE) 500 MG tablet Take 500 mg by mouth 2 times daily (with meals)      Omega-3 Fatty Acids (FISH OIL) 1000 MG CAPS Take 1,000 mg by mouth every morning      allopurinol (ZYLOPRIM) 100 MG tablet Take 100 mg by mouth every morning       vitamin D (CHOLECALCIFEROL) 1000 UNIT TABS tablet Take 1,000 Units by mouth nightly       amLODIPine (NORVASC) 10 MG tablet Take 10 mg by mouth daily       No current facility-administered medications for this encounter.        Allergies:  No Known Allergies    Problem List:    Patient Active Problem List   Diagnosis Code    Primary osteoarthritis of left hip M16.12    Primary osteoarthritis of left hip M16.12    Instability of right hip joint M25.351    Diverticulosis K57.90    Acute blood loss anemia D62    Diabetes mellitus type 2, [facility] E11.9    Acute respiratory failure with hypoxia (HCC) J96.01    Viral pneumonia J12.9    Morbid obesity with BMI of 50.0-59.9, adult (HCC) E66.01, Z68.43    Bacteremia R78.81    Transaminitis R74.01    CHAITANYA (obstructive sleep apnea) G47.33    Atelectasis J98.11    Chronic atrial fibrillation (HCC) I48.20    Discharge planning issues Z02.9    Disorder of aorta (HCC) I77.9    Dyspnea on exertion R06.00    Electrocardiogram abnormal R94.31    Aortic valve regurgitation I35.1    Morbid obesity (HCC) E66.01    On home O2 Z99.81    Postoperative hypertension I97.3    Stomach ulcer K25.9    Tobacco user Z72.0    Persistent atrial fibrillation (HCC) I48.19       Past Medical History:        Diagnosis Date    Arthritis     osteo    Diabetes mellitus ([facility]     Full dentures     only wears uppers    Gout     h/o    History of blood transfusion     Hypertension     Sleep apnea     uses CPAP       Past Surgical History:        Procedure Laterality Date    COLONOSCOPY      HERNIA REPAIR      HIP CLOSED REDUCTION Right 2013    HIP CLOSED REDUCTION Right 2015    JOINT REPLACEMENT Right 16 yrs ago    tjah    KNEE ARTHROSCOPY Bilateral     OTHER SURGICAL HISTORY  2014    left total hip arthroplasty    NM COLONOSCOPY FLX DX W/COLLJ SPEC WHEN PFRMD N/A 10/19/2018    COLONOSCOPY DIAGNOSTIC performed by Vicky Steiner MD at [address] Right 2015    UPPER GASTROINTESTINAL ENDOSCOPY N/A 10/17/2018    EGD BIOPSY performed by [facility] Steiner MD at [facility] ENDOSCOPY       Social History:    Social History     Tobacco Use    Smoking status: Current Some Day Smoker     Packs/day: 0.50     Years: 30.00     Pack years: 15.00     Start date: 1970     Last attempt to quit: 2020     Years since quittin.6    Smokeless tobacco: Former User   Substance Use Topics    Alcohol use: Yes     Comment: about a bottle of whiskey per day. not since last week                                Ready to quit: Not Answered  Counseling given: Not Answered      Vital Signs (Current):   Vitals:    22 0941   BP: 111/73   Pulse: 87   Resp: 20   Temp: 36.7 °C (98.1 °F)   SpO2: 95%   Weight: (!) 425 lb (192.8 kg)   Height: 6' (1.829 m)                                              BP Readings from Last 3 Encounters:   22 111/73   21 (!) 147/70   21 (!) 152/84       NPO Status:  greater than 8 hours except sip of water with morning meds                                                                                BMI:   Wt Readings from Last 3 Encounters:   22 (!) 425 lb (192.8 kg)   21 (!) 446 lb 9.6 oz (202.6 kg)   21 (!) 435 lb 9.6 oz (197.6 kg)     Body mass index is 57.64 kg/m².     CBC:   Lab Results   Component Value Date    WBC 12.1 2021 RBC 4.86 2021    HGB 14.7 2021    HCT 45.2 2021    MCV 93.0 2021    RDW 13.5 2021     2021       CMP:   Lab Results   Component Value Date     2021    K 4.4 2021    K 4.4 2020    CL 99 2021    CO2 24 2021    BUN 15 2021    CREATININE 0.8 2021    GFRAA >60 2021    LABGLOM >60 2021    GLUCOSE 156 2021    PROT 7.7 2021    CALCIUM 8.8 2021    BILITOT 0.7 2021    ALKPHOS 111 2021    AST 32 2021    ALT 21 2021       POC Tests: No results for input(s): POCGLU, POCNA, POCK, POCCL, POCBUN, POCHEMO, POCHCT in the last 72 hours. Coags:   Lab Results   Component Value Date    PROTIME 12.0 2020    INR 1.0 2020    APTT 27.7 2020       HCG (If Applicable): No results found for: PREGTESTUR, PREGSERUM, HCG, HCGQUANT     ABGs:   Lab Results   Component Value Date    KYM4EAV 30.5 2020        Type & Screen (If Applicable):  No results found for: LABABO, LABRH       EK/3/2022  Ventricular Rate BPM 76  67  72  65  72  64  69    Atrial Rate   67  72  65  147  258  87    QRS Duration ms 104  100  106  100  94  96  100    Q-T Interval ms 402  434  428  450  420  432  438    QTc Calculation (Bazett) ms 452  458  468  468  459  445  469    R Axis degrees -50  -35  -37  -37  -50  -42  -46    T Axis degrees 31  39  48  34  29  0  19    Resulting Agency  [address] [place] [address] St.             Narrative & Impression    Atrial fibrillation  Left axis deviation  Low voltage QRS  Cannot rule out Inferior infarct (cited on or before 08-DEC-2021)  Cannot rule out Anterior infarct (cited on or before 09-DEC-2021)  Abnormal ECG  When compared with ECG of 11-DEC-2021 07:59,  Significant changes have occurred           ECHO: 2020   Findings      Left Ventricle   Mildly dilated left ventricle.    Micro-bubble contrast injected to enhance left ventricular visualization. Moderate left ventricular concentric hypertrophy noted. No regional wall motion abnormalities seen. Low normal left ventricular systolic function. Ejection fraction is visually estimated at 50%. Indeterminate diastolic function. Right Ventricle   The right ventricle was not clearly visualized. Grossly normal right ventricular size. Right ventricle global systolic function is reduced . Left Atrium   The left atrium is severely dilated. Interatrial septum was suboptimally visualized. Right Atrium   Right atrium is not clearly visualized. Moderately enlarged right atrium size. Mitral Valve   The mitral valve was not well visualized. Moderate mitral annular calcification. Tricuspid Valve   The tricuspid valve was not well visualized. Aortic Valve   The aortic valve leaflets were not well visualized. The aortic valve appears moderately sclerotic. Gradients suggest aortic stenosis but cannot accurately verify. Pulmonic Valve   The pulmonic valve was not well visualized. Pericardial Effusion   Fat pad versus small effusion cannot be ruled out. Aorta   Aorta was not clearly visualized. Conclusions      Summary   Technically suboptimal and limited study. Mildly dilated left ventricle. Micro-bubble contrast injected to enhance left ventricular visualization. Moderate left ventricular concentric hypertrophy noted. No regional wall motion abnormalities seen. Low normal left ventricular systolic function. The left atrium is severely dilated. Moderately enlarged right atrium size. Moderate mitral annular calcification. The aortic valve appears moderately sclerotic.          Anesthesia Evaluation  Patient summary reviewed and Nursing notes reviewed no history of anesthetic complications:   Airway: Mallampati: II  TM distance: >3 FB   Neck ROM: full  Mouth opening: > = 3 FB Dental:    (+) edentulous

## 2022-01-03 NOTE — H&P
[address] and Vascular [facility] Electrophysiology  History and Physical Examination  PATIENT: Sharon Leal  MEDICAL RECORD NUMBER: [id number]  DATE OF SERVICE:  1/3/2022  ATTENDING ELECTROPHYSIOLOGIST: Jaimie Corey MD  REFERRING PHYSICIAN: Emma Tran MD and Mohan Romano MD  CHIEF COMPLAINT: Persistent atrial fibrillation    HPI: This is a 77 y.o. male with a history of   Patient Active Problem List   Diagnosis    Primary osteoarthritis of left hip    Primary osteoarthritis of left hip    Instability of right hip joint    Diverticulosis    Acute blood loss anemia    Diabetes mellitus type 2, controlled ([address]    Acute respiratory failure with hypoxia ([address]    Viral pneumonia    Morbid obesity with BMI of 50.0-59.9, adult ([address]    Bacteremia    Transaminitis    CHAITANYA (obstructive sleep apnea)    Atelectasis    Chronic atrial fibrillation ([address]    Discharge planning issues    Disorder of aorta (HCC)    Dyspnea on exertion    Electrocardiogram abnormal    Aortic valve regurgitation    Morbid obesity ([address]    On home O2    Postoperative hypertension    Stomach ulcer    Tobacco user    Persistent atrial fibrillation ([address]   Initial consultation 11/3/21: The patient presents to cardiac electrophysiology clinic for consultation of persistent AF, s/p TAVR  5/26/21 ([facility]). His PMHx is noted below and includes: VHD: mod/severe AS-s/p TAVR; mild TR; trace MR; persistent AF on [address] [place] [address]; DM; CHAITANYA; morbid obesity; BERNAL. Current medications: amlodipine, apixaban, tenormin, lisinopril, metformin, allopurinol, ferrous sulfate, pantoprazole, and vitamins. He follows with Dr Cherry Gaviria and [facility] for his history of VHD and underwent TAVR procedure on 5/26/21. Since the TAVR procedure he has been in AF on Eliquis. A TTE on  7/16/21 ([facility]) shows an LVEF 65%, severely dilated LA and dilated RA (see below/Care Everywhere). A cardiac MRI showed no evidence of scar.  F recommended an EP referral and the patient opted to follow locally. The patient reports he did have syncopal episode after drinking at a bar, he does report consuming ETOH at that time. He presents today in AF with CVR. He reports he is unable to tell when he is in AF and remains asymptomatic. In terms of rhythm control we discussed about cardioversion with the addition of AAD therapy. Also we discussed about rate control strategy due to being asymptomatic. I don't believe AF catheter ablation would be a option due to obesity. The patient denies any chest pain, dyspnea, palpitations, dizziness, syncope, orthopnea or paroxysmal nocturnal dyspnea. 12/8/21: The patient presents to the hospital for elective admission for Tikosyn initiation. The patient opts for rhythm control treatment with Tikosyn initiation and possible DC-cardioversion. He reported chronic dyspnea on exertion. The patient denies any chest pain, palpitations, dizziness, syncope, orthopnea or paroxysmal nocturnal dyspnea. He presents today in AF with CVR. 12/8/21: The patient presents to the hospital for elective admission for Tikosyn initiation. The patient opts for rhythm control treatment with Tikosyn initiation and possible DC-cardioversion. He reported chronic dyspnea on exertion. The patient denies any chest pain, palpitations, dizziness, syncope, orthopnea or paroxysmal nocturnal dyspnea. He presents today in AF with CVR.     12/09/21: Tony Johnston continues in atrial fibrillation with a stable CrCl and QTc. He underwent successful DC-cardioversion today. 1/3/22: He was noted to have recurrent AF on 12/17/21 and schedule for DC-cardioversion today. He states that he forgot taking Eliquis for several doses before Christmas. I explained the risks & benefits of a MELE and CV  to the patient.  These include but are not limited to sedation, allergy, aspiration, respiratory distress/arrest, esophageal damage/perforation, and death for MELE and burning of skin, bradycardia required pacemaker, stroke and death for CV. He verbalizes understanding and agrees to proceed with the procedure. Patient Active Problem List    Diagnosis Date Noted    Persistent atrial fibrillation ([facility] 12/08/2021    Discharge planning issues 11/03/2021     Overview Note:     Formatting of this note might be different from the original.  Joann Vizcarra is a 77year old male from [place], [place]    Anticipated Discharge Needs:  No anticipated DC needs and CM consult for placement      Stomach ulcer 11/03/2021     Overview Note:     Formatting of this note might be different from the original.  History: per EPIC, takes protonix 40mg BID at home  Assessment: no c/o epigastric discomfort  Plan: Continue pantoprazole 40mg BID      Postoperative hypertension 05/28/2021     Overview Note:     Formatting of this note might be different from the original.  History: Post op prob  Assessment: SBPs 120s-140s  Plan:  continue low dose norvasc, IV lasix. Consider starting BB in setting of afib if rate increases      Atelectasis 05/26/2021     Overview Note:     Formatting of this note might be different from the original.  History: postop  Assessment: diffuse atelectasis    Plan: PEP, up OOB to chair daily, cough and deep breathing      Chronic atrial fibrillation ([facility] 05/25/2021     Overview Note:     Formatting of this note might be different from the original.  History: Pt reports irregular heart beat for at least 2 years. Reports never being told of afib and has not been on [facility]. Preop ECG 4/8/2021 showed Afib  Assessment: 5/28 likely accelerated junctional per EPS-monitor for now. Currently  appears in afib on tele (HR 50s-70s), but rate controlled. Plan: continue eliquis  per CONTRERAS. Continue to monitor off BB as HR controlled.    received pharm message on 6/2 re: Eliquis not recommended for pt's with weight greater than 160kg as this has led to concerns that fixed dosed [facility] regimens may lead to alerted clearance and/or decreased drug exposure in obese patients. Based on the concerns of duration of his afib (history of this pre op yet not on any AC) and elevated weight, Coumadin may be more beneficial; however, spoke with Dr. Lucía Bullock who agreed to continue Eliquis at this time as this would be more beneficial then monotherapy with just ASA.  On home O2 2021    Transaminitis     CHAITANYA (obstructive sleep apnea)     Bacteremia 2020    Acute respiratory failure with hypoxia ([address] 2020    Viral pneumonia 2020    Morbid obesity with BMI of 50.0-59.9, adult ([address] 2020    Diverticulosis 10/20/2018    Acute blood loss anemia 10/20/2018    Diabetes mellitus type 2, controlled ([address] 10/20/2018    Dyspnea on exertion 10/04/2018    Aortic valve regurgitation 10/06/2016    Disorder of aorta ([address] 2016    Electrocardiogram abnormal 2016    Morbid obesity ([address] 2016    Tobacco user 2016    Instability of right hip joint 2015    Primary osteoarthritis of left hip 2014    Primary osteoarthritis of left hip 2014       Past Medical History:   Diagnosis Date    Arthritis     osteo    Diabetes mellitus ([address]     Full dentures     only wears uppers    Gout     h/o    History of blood transfusion     Hypertension     Sleep apnea     uses CPAP       Family History   Problem Relation Age of Onset    Other Mother     Other Father     Other Brother        Social History     Tobacco Use    Smoking status: Current Some Day Smoker     Packs/day: 0.50     Years: 30.00     Pack years: 15.00     Start date: 1970     Last attempt to quit: 2020     Years since quittin.6    Smokeless tobacco: Former User   Substance Use Topics    Alcohol use: Yes     Comment: about a bottle of whiskey per day.  not since last week       Current Outpatient Medications   Medication Sig Dispense Refill    dofetilide (TIKOSYN) 500 MCG capsule Take 1 capsule by mouth every 12 hours 60 capsule 3    magnesium oxide (MAG-OX) 400 MG tablet Take 1 tablet by mouth daily 30 tablet 1    ascorbic acid (VITAMIN C) 500 MG tablet Take 500 mg by mouth 2 times daily      aspirin 81 MG EC tablet Take 1 tablet every day by oral route.  apixaban (ELIQUIS) 5 MG TABS tablet Take 5 mg by mouth 2 times daily      ferrous sulfate (FE TABS) 325 (65 Fe) MG EC tablet Take 1 tablet by mouth 2 times daily 90 tablet 3    metFORMIN (GLUCOPHAGE) 500 MG tablet Take 500 mg by mouth 2 times daily (with meals)      Omega-3 Fatty Acids (FISH OIL) 1000 MG CAPS Take 1,000 mg by mouth every morning      allopurinol (ZYLOPRIM) 100 MG tablet Take 100 mg by mouth every morning       vitamin D (CHOLECALCIFEROL) 1000 UNIT TABS tablet Take 1,000 Units by mouth nightly       amLODIPine (NORVASC) 10 MG tablet Take 10 mg by mouth daily       Current Facility-Administered Medications   Medication Dose Route Frequency Provider Last Rate Last Admin    0.9 % sodium chloride infusion   IntraVENous Once Petra Eastman MD            No Known Allergies    ROS:   Constitutional: Negative for fever, activity change and appetite change. HENT: Negative for epistaxis. Eyes: Negative for diploplia, blurred vision. Respiratory: Negative for cough, chest tightness, shortness of breath and wheezing. Cardiovascular: pertinent positives in HPI  Gastrointestinal: Negative for abdominal pain and blood in stool. All other review of systems are negative     PHYSICAL EXAM:   Vitals:    01/03/22 0941   BP: 111/73   Pulse: 87   Resp: 20   Temp: 98.1 °F (36.7 °C)   SpO2: 95%   Weight: (!) 425 lb (192.8 kg)   Height: 6' (1.829 m)      Constitutional: Well-developed, no acute distress  Eyes: conjunctivae normal, no xanthelasma   Ears, Nose, Throat: oral mucosa moist, no cyanosis   CV: no JVD. IRIR. No murmurs, rubs, or gallops.  PMI is nondisplaced  Lungs: clear to auscultation bilaterally, normal respiratory effort without used of accessory muscles  Abdomen: soft, non-tender, bowel sounds present, no masses or hepatomegaly   Musculoskeletal: no digital clubbing, trace edema of LEs  Skin: warm, no rashes     I have personally reviewed the laboratory, cardiac diagnostic and radiographic testing as outlined below:    Data:    No results for input(s): WBC, HGB, HCT, PLT in the last 72 hours. No results for input(s): NA, K, CL, CO2, BUN, CREATININE, GLU, CALCIUM in the last 72 hours. Invalid input(s): MAGNESIUM   Lab Results   Component Value Date    MG 1.7 12/11/2021     No results for input(s): TSH in the last 72 hours. No results for input(s): INR in the last 72 hours. CXR: 5/30/21 [facility]  IMPRESSION:     No acute disease identified in the lungs or mediastinum. Specifically, I   detect no evidence of pneumothorax. There has been little interval change   since the exam dated 05/29/2021. : [facility]     Transcribe Date/Time: May 30 2021  2:04P     Dictated by : Clarita Do MD     This examination was interpreted and the report reviewed and   electronically signed by:   Clarita Do MD on May 30 2021  2:06PM  EST    EKG 01/03/22: Atrial fibrillation 76 bpm, low voltage QRS, LAD, old anterior MI, QRS  104, QTc 452 ms. Please see scan in Cardiology. Echocardiogram: [facility]  CONCLUSIONS:   - Exam indication: s/p TAVR   - The left ventricle is normal in size. Left ventricular systolic function is   normal. EF = 65 ± 5% (visual est.)   - The right ventricle is normal in size. Right ventricular systolic function is   normal.   - The left atrial cavity is severely dilated. - The right atrial cavity is dilated. - S/P transcatheter aortic valve replacement. Contreras S3 prosthetic aortic valve   (size #29). There is no aortic valve regurgitation. The peak gradient is 35 mmHg,   the mean gradient is 18 mmHg and the dimensionless valve index is 0.50.  Prior AV   gradients Right ventricular systolic function is normal. RV systolic tissue Doppler velocity    is 15.0 cm/s. Estimated right ventricular systolic pressure is not reported due to an   insufficient tricuspid regurgitation signal. Estimated right atrial pressure is   not included as the IVC was not seen. LEFT ATRIUM   The left atrial cavity is severely dilated. RIGHT ATRIUM   The right atrial cavity is dilated. MITRAL VALVE   There is trace mitral valve regurgitation. There is no thickening. TRICUSPID VALVE   There is trace tricuspid valve regurgitation. There is no thickening. AORTIC VALVE   Contreras S3 prosthetic valve size #29. There is no aortic valve regurgitation. S/P   transcatheter aortic valve replacement. The peak gradient is 35 mmHg (peak   velocity = 294.2 cm/s). The mean gradient is 18 mmHg. The LVOT mean velocity is   84.7 cm/s. The LVOT diameter is 2.9 cm. The aortic VTI is 48.2 cm. The mean   velocity in the aortic valve is 197.5 cm/s. The dimensionless valve index is 0.50.    AV area is 3.30 cm² (1.11 cm²/m²) by continuity, VTI. The LVOT stroke volume   index is 56 ml/m². PULMONIC VALVE   There is trace pulmonic valve regurgitation. There is no thickening. AORTA   The aorta is unseen or not interrogated. PULMONARY ARTERIES   The pulmonary arteries are unseen or not interrogated. PERICARDIUM   There is a trivial pericardial effusion. There is an epicardial fat pad. Date: 21   Received From: [facility]          Cardiac Catheterization: 21 ([facility]- full report Care Everywhere)    PATIENT INFORMATION   +-------------------+     Name:        MR. Ar Cartagena   MRN:         [id number]   Accession #: [id number]   :         1955   Age:         66 years   Gender:      M   Height: 72 in / 183 cm   Weight: 460.00 lb / 208.66 kg   BMI:    62.31 kg/m²   BSA:    3.04 m²     Allergies: NKDA     +------------------------------+   CLINICAL HISTORY/INDICATION(s)   +------------------------------+     Severe chronic native or prosthetic aortic stenosis, patient asymptomatic related   to valvular disease; AUC score = 4Preoperative assessment before valvular surgery;    AUC score = 7. Procedural Status: Elective     CAD Presentation: No Symptoms, No Angina     Angina Classification (within 2 weeks): No Angina     No Heart Failure     77year old White male with a history of of severe AS, incomplete LBBB, DM2,   morbid obesity, CHAITANYA gout and newly diagnosed AF who presents for [facility] prior to TAVR    evaluation. Access Point        Sheath Size Hemostasis Method   Right Radial Artery 5F Short    Radial TR band       +-------------------+   DIAGNOSTIC FINDINGS   +-------------------+     Coronary Anatomy: Right Dominant     Injection Site(s): Coronary Artery     LMT: _ The LMT is normal.     LAD:   _ The LAD has mild luminal irregularities. LCX: _ The Circumflex is normal.     RAMUS: _ The Ramus is Absent. RCA:   _ RCA is normal.     +------------+   HEMODYNAMICS   +------------+       +---------------+   IMPRESSION/PLAN   +---------------+     Impression:   1. Right dominant system. 2. Mild non-obstructive CAD. 3. Unable to cross aortic valve with catheter due to radial/subclavian spasm. Recommended Treatment: Valve repair / replacement and Medical Therapy. Plan: TAVR vs SAVR evaluation. No indication for revascularization prior or at the    time of valve replacement.     +----------------------------------+   ADVERSE OUTCOME(s)/COMPLICATION(s)   +----------------------------------+     None     CTA of chest/abdomen/pelvis 4/8/21 ([facility])  IMPRESSION:     1.  Trileaflet aortic valve with severe cusp calcifications.  Aortic   valve orifice area = 0.8 cm2. The aortic annulus measures 3.0 x 2.6 cm; cross-sectional area = 5.9 cm2;   circumference = 8.8 cm.      2.  Normal caliber thoracic and abdominal aorta.  No acute aortic pathology identified.  The pelvic arteries, including the common femoral   arteries are also normal in course, caliber, and contour.  The minimal   luminal caliber throughout = 0.9 cm, bilaterally. 3.  Mild right and severe left atrial enlargement.  Mild mitral annular   calcification. : ARIELLE     Transcribe Date/Time: Apr 8 2021  3:38P     Dictated by : Ahsan Tovar MD     This examination was interpreted and the report reviewed and   electronically signed by:   Ahsan Tovar MD on Apr 8 2021  5:43PM  EST     Date: 04/08/21   Received From: [address] [place] [address] Rd test/Cardiac MRI (Dr Terrell Colunga)          I have independently reviewed all of the ECGs and rhythm strips per above     Assessment/Plan: This is a 77 y.o. male with a history of   Patient Active Problem List   Diagnosis    Primary osteoarthritis of left hip    Primary osteoarthritis of left hip    Instability of right hip joint    Diverticulosis    Acute blood loss anemia    Diabetes mellitus type 2, controlled ([address]    Acute respiratory failure with hypoxia ([address]    Viral pneumonia    Morbid obesity with BMI of 50.0-59.9, adult ([address]    Bacteremia    Transaminitis    CHAITANYA (obstructive sleep apnea)    Atelectasis    Chronic atrial fibrillation ([address]    Discharge planning issues    Disorder of aorta (HCC)    Dyspnea on exertion    Electrocardiogram abnormal    Aortic valve regurgitation    Morbid obesity (HCC)    On home O2    Postoperative hypertension    Stomach ulcer    Tobacco user    Persistent atrial fibrillation ([address]      1.  Persistent atrial fbrillation  - Documented AF per EKG at [facility] since 4/12/21 before TAVR 5/26/21  - Asymptomatic.   - [id number] score: 3  - On Eliquis 5 mg BID for stroke risk reduction.  - On Atenolol 50 mg QD for rate control.  - TTE 7/16/21 ([facility]): Left ventricular  EF = 65 ± 5% (visual est.) The left atrial cavity is severely dilated and the right atrial cavity is dilated. - Had an extensive discussion regarding options between rate and rhythm control and the patient opts for rhythm control.  - For rhythm control we have chosen to utilize cardioversion with the addition of Tikosyn therapy. He is not a good candidate for ablation.  - He spontaneously converted post the 4th dose to sinus.   - Noted to have recurrent AF on 12/17/21.  - Re-education on importance of well controlled HTN (goal BP < 130/80), adequate weight control (goal BMI of < 27), physical activity consisting of moderate cardiopulmonary exercise up to a goal of 250 min/wk, daily compliance with CPAP in treating sleep apnea, smoking cessation and limited ETOH intake.      2. Left anterior fascicular block     3. Aortic valve disease  - S/p [facility] 5/26/21 ([facility])  - TTE 7/16/21 ([facility]) - S/P transcatheter aortic valve replacement. Contreras S3 prosthetic aortic valve   (size #29). There is no aortic valve regurgitation. The peak gradient is 35 mmHg,   the mean gradient is 18 mmHg and the dimensionless valve index is 0.50. Prior AV   gradients of 28/16 mmHg  - Follows with Dr Benjamin Paryr.     4. CHAITANYA  - Compliant with BiPAP.     5. Morbid obesity  - Encouraged aggressive weight loss. - Body mass index is 60.57 kg/m².       6. Diabetes mellitus  - On Metformin  - Management per PCP      7. Hypertension  - Elevated   - On Amlodipine, Atenolol and Lisinopril     8. ETOH usage     9. Prior tobacco usage     10. Syncope  - Suspected vasovagal from history. Recommendations:    1. Will proceed with MELE and DC-cardioversion. 2. Follow up after the procedure. Encouraged the patient to call the office for any questions or concerns. Thank you for allowing me to participate in your patient's care. Please call me if there are any questions or concerns.       Mili Kathleen MD  Cardiac Electrophysiology  [address]  [facility] [place]: [place] [facility]  10:28 AM  1/3/2022

## 2022-01-03 NOTE — PROCEDURES
[address]  [place] [address] Electrophysiology  Procedure Report  PATIENT: Yvette Arndt  MEDICAL RECORD NUMBER: [id number]  DATE OF PROCEDURE:  1/3/2022  ATTENDING ELECTROPHYSIOLOGIST:  Owen Pittman MD  REFERRING PHYSICIAN: Dr. Kang Nations:    1. Transesophageal echocardiogram  2. Direct Current Electrical Cardioversion    INDICATION: Persistent atrial fibrillation    PROCEDURE PERFORMED By: Owen Pittman MD    PROCEDURE TIME: Thirty minutes    COMPICATIONS: None immediately apparent    ANESTHESIA: LMAC    DESCRIPTION OF PROCEDURE: The risks, benefits, and alternatives to the procedure were discussed with the patient, and informed consent was obtained. The patient was brought to the cardiovascular lab and sedated under the guidance of anesthesia. Transesophageal echocardiogram was performed and showed no evidence of left atrial appendage thrombus. Once anesthesia was deemed adequate, a 360 J biphasic synchronous shocks were applied 3 times consecutively. First two shocks failed to convert the rhythm back to normal sinus rhythm. The third shock successfully converted the rhythm back to normal sinus rhythm. The patient was then allowed to wake in the usual fashion. SUMMARY:  1. Successful cardioversion of persistent atrial fibrillation to sinus rhythm. RECOMMENDATIONS:  1. Discharge to home when fully awake and alert, if clinically stable. 2. Restart Tenormin at 50 mg daily. Resume all others pre-procedure medications, including anticoagulation. 3. Follow-up in the office in 1 week for EKG and 1 month with provider.     Owen Pittman MD  Cardiac Electrophysiology  [facility]  [facility] [place]: [place] [facility]  10:30 AM  1/3/2022

## 2022-01-04 NOTE — ANESTHESIA POSTPROCEDURE EVALUATION
Department of Anesthesiology  Postprocedure Note    Patient: Shira Guerra  MRN: [id number]  YOB: 1955  Date of evaluation: 1/4/2022  Time:  8:08 AM     Procedure Summary     Date: 01/03/22 Room / Location: [facility] CATH LAB    Anesthesia Start: 8909 Anesthesia Stop: 1101    Procedure: CARDIOVERSION WITH ANESTHESIA Diagnosis: Chronic atrial fibrillation, unspecified    Scheduled Providers:  Responsible Provider: Esther Tan MD    Anesthesia Type: MAC ASA Status: 3          Anesthesia Type: MAC    Raine Phase I:      Raine Phase II:      Last vitals: Reviewed and per EMR flowsheets.        Anesthesia Post Evaluation    Patient location during evaluation: PACU  Patient participation: complete - patient participated  Level of consciousness: awake and alert  Airway patency: patent  Nausea & Vomiting: no nausea and no vomiting  Complications: no  Cardiovascular status: hemodynamically stable and blood pressure returned to baseline  Respiratory status: acceptable  Hydration status: euvolemic

## 2022-01-13 ENCOUNTER — NURSE ONLY (OUTPATIENT)
Dept: NON INVASIVE DIAGNOSTICS | Age: 67
End: 2022-01-13
Payer: MEDICARE

## 2022-01-13 DIAGNOSIS — I48.20 CHRONIC ATRIAL FIBRILLATION (HCC): Primary | ICD-10-CM

## 2022-01-13 PROCEDURE — 93000 ELECTROCARDIOGRAM COMPLETE: CPT | Performed by: INTERNAL MEDICINE

## 2022-02-02 ENCOUNTER — TELEPHONE (OUTPATIENT)
Dept: NON INVASIVE DIAGNOSTICS | Age: 67
End: 2022-02-02

## 2022-02-02 NOTE — TELEPHONE ENCOUNTER
----- Message from Aquiles Munoz MD sent at 1/3/2022 10:33 AM EST -----  Please schedule EKG in 1 week and follow up in 6 to 8 weeks. Thanks.

## 2022-04-18 RX ORDER — DOFETILIDE 0.5 MG/1
CAPSULE ORAL
Qty: 60 CAPSULE | Refills: 0 | Status: SHIPPED
Start: 2022-04-18 | End: 2022-06-17

## 2022-04-18 NOTE — TELEPHONE ENCOUNTER
Dr Yanni Thompson patient  Labs in the system from 1/3/2022   Dr Rupert Chandra just did a  Day refill because patient was almost out.   Fredis Walters is having more labs/ekg on 4/20/2022 at Dr Rupert Chandra office

## 2022-04-18 NOTE — TELEPHONE ENCOUNTER
He will also need an EKG prior to additional refills. CrCl is 98.37 mL/min based on lab work from 01/03/22  EKG showed QTc 412ms on 01/13/22.

## 2022-05-23 ENCOUNTER — TELEPHONE (OUTPATIENT)
Dept: NON INVASIVE DIAGNOSTICS | Age: 67
End: 2022-05-23

## 2022-06-17 RX ORDER — DOFETILIDE 0.5 MG/1
CAPSULE ORAL
Qty: 180 CAPSULE | Refills: 1 | Status: SHIPPED | OUTPATIENT
Start: 2022-06-17

## 2022-06-17 NOTE — PROGRESS NOTES
[address] [facility],2Nd Floor and [address] [facility] Electrophysiology  Outpatient Progress Note  PATIENT: 424 [facility] RECORD NUMBER: [id number]  DATE OF SERVICE:  6/20/2022  ATTENDING ELECTROPHYSIOLOGIST: Darshana Chang MD  REFERRING PHYSICIAN: No ref. provider found and Jairo Sim MD  CHIEF COMPLAINT: Persistent atrial fibrillation    HPI: This is a 79 y.o. male with a history of   Patient Active Problem List   Diagnosis    Primary osteoarthritis of left hip    Primary osteoarthritis of left hip    Instability of right hip joint    Diverticulosis    Acute blood loss anemia    Diabetes mellitus type 2, controlled ([address]    Acute respiratory failure with hypoxia ([address]    Viral pneumonia    Morbid obesity with BMI of 50.0-59.9, adult ([address]    Bacteremia    Transaminitis    CHAITANYA (obstructive sleep apnea)    Atelectasis    Chronic atrial fibrillation ([address]    Discharge planning issues    Disorder of aorta (HCC)    Dyspnea on exertion    Electrocardiogram abnormal    Aortic valve regurgitation    Morbid obesity ([address]    On home O2    Postoperative hypertension    Stomach ulcer    Tobacco user    Persistent atrial fibrillation ([address]   Initial consultation 11/3/21: The patient presents to cardiac electrophysiology clinic for consultation of persistent AF, s/p TAVR  5/26/21 ([facility]). His PMHx is noted below and includes: VHD: mod/severe AS-s/p TAVR; mild TR; trace MR; persistent AF on [address] [place] [address]; DM; CHAITANYA; morbid obesity; BERNAL. Current medications: amlodipine, apixaban, tenormin, lisinopril, metformin, allopurinol, ferrous sulfate, pantoprazole, and vitamins. He follows with Dr Lexis Hansen and [facility] for his history of VHD and underwent TAVR procedure on 5/26/21. Since the TAVR procedure he has been in AF on Eliquis. A TTE on  7/16/21 ([facility]) shows an LVEF 65%, severely dilated LA and dilated RA (see below/Care Everywhere). A cardiac MRI showed no evidence of scar.  [facility] recommended an bradycardia required pacemaker, stroke and death for CV. He verbalizes understanding and agrees to proceed with the procedure. 6/20/22: Wynema Dakin is seen in cardiac electrophysiology clinic for follow up and management of persistent atrial fibrillation. He underwent successful DC-cardioversion on 1/3/2022. He reports feeling overall well from a EP POV. He continues on Tikosyn for AF suppression and presents today in SR with a stable QTc. The patient denies any chest pain, dyspnea, palpitations, dizziness, syncope, orthopnea or paroxysmal nocturnal dyspnea. Patient Active Problem List    Diagnosis Date Noted    Persistent atrial fibrillation ([facility] 12/08/2021    Discharge planning issues 11/03/2021     Overview Note:     Formatting of this note might be different from the original.  Ro Shipley is a 77year old male from [facility]    Anticipated Discharge Needs:  No anticipated DC needs and CM consult for placement      Stomach ulcer 11/03/2021     Overview Note:     Formatting of this note might be different from the original.  History: per EPIC, takes protonix 40mg BID at home  Assessment: no c/o epigastric discomfort  Plan: Continue pantoprazole 40mg BID      Postoperative hypertension 05/28/2021     Overview Note:     Formatting of this note might be different from the original.  History: Post op prob  Assessment: SBPs 120s-140s  Plan:  continue low dose norvasc, IV lasix. Consider starting BB in setting of afib if rate increases      Atelectasis 05/26/2021     Overview Note:     Formatting of this note might be different from the original.  History: postop  Assessment: diffuse atelectasis    Plan: PEP, up OOB to chair daily, cough and deep breathing      Chronic atrial fibrillation ([address] 05/25/2021     Overview Note:     Formatting of this note might be different from the original.  History: Pt reports irregular heart beat for at least 2 years.  Reports never being told of afib and has not been on [facility]. Preop ECG 4/8/2021 showed Afib  Assessment: 5/28 likely accelerated junctional per EPS-monitor for now. Currently  appears in afib on tele (HR 50s-70s), but rate controlled. Plan: continue eliquis  per CONTRERAS. Continue to monitor off BB as HR controlled. received pharm message on 6/2 re: Eliquis not recommended for pt's with weight greater than 160kg as this has led to concerns that fixed dosed AC regimens may lead to alerted clearance and/or decreased drug exposure in obese patients. Based on the concerns of duration of his afib (history of this pre op yet not on any AC) and elevated weight, Coumadin may be more beneficial; however, spoke with Dr. Vickie Robles who agreed to continue Eliquis at this time as this would be more beneficial then monotherapy with just ASA.       On home O2 05/25/2021    Transaminitis     CHAITANYA (obstructive sleep apnea)     Bacteremia 05/09/2020    Acute respiratory failure with hypoxia ([address] 05/07/2020    Viral pneumonia 05/07/2020    Morbid obesity with BMI of 50.0-59.9, adult ([address] 05/07/2020    Diverticulosis 10/20/2018    Acute blood loss anemia 10/20/2018    Diabetes mellitus type 2, controlled ([address] 10/20/2018    Dyspnea on exertion 10/04/2018    Aortic valve regurgitation 10/06/2016    Disorder of aorta ([address] 06/22/2016    Electrocardiogram abnormal 06/22/2016    Morbid obesity ([address] 06/22/2016    Tobacco user 06/22/2016    Instability of right hip joint 06/29/2015    Primary osteoarthritis of left hip 12/11/2014    Primary osteoarthritis of left hip 11/26/2014       Past Medical History:   Diagnosis Date    Arthritis     osteo    Diabetes mellitus ([address]     Full dentures     only wears uppers    Gout     h/o    History of blood transfusion     Hypertension     Sleep apnea     uses CPAP       Family History   Problem Relation Age of Onset    Other Mother     Other Father     Other Brother        Social History     Tobacco Use    Smoking status: Former Smoker     Packs/day: 0.50     Years: 30.00     Pack years: 15.00     Start date: 1970     Quit date: 2020     Years since quittin.1    Smokeless tobacco: Former User   Substance Use Topics    Alcohol use: Yes     Alcohol/week: 2.0 standard drinks     Types: 2 Cans of beer per week     Comment: about a bottle of whiskey per day. Current Outpatient Medications   Medication Sig Dispense Refill    ELIQUIS 5 MG TABS tablet Take 5 mg by mouth in the morning and at bedtime      metFORMIN (GLUCOPHAGE) 1000 MG tablet Take 1,000 mg by mouth in the morning and at bedtime      dofetilide (TIKOSYN) 500 MCG capsule TAKE ONE CAPSULE BY MOUTH EVERY 12 HOURS 180 capsule 1    atenolol (TENORMIN) 50 MG tablet Take 1 tablet by mouth daily (Patient taking differently: Take 25 mg by mouth daily ) 30 tablet 3    magnesium oxide (MAG-OX) 400 MG tablet Take 1 tablet by mouth daily 30 tablet 1    ascorbic acid (VITAMIN C) 500 MG tablet Take 500 mg by mouth 2 times daily      aspirin 81 MG EC tablet Take 1 tablet every day by oral route.  ferrous sulfate (FE TABS) 325 (65 Fe) MG EC tablet Take 1 tablet by mouth 2 times daily (Patient taking differently: Take 325 mg by mouth daily (with breakfast) ) 90 tablet 3    Omega-3 Fatty Acids (FISH OIL) 1000 MG CAPS Take 1,000 mg by mouth every morning      allopurinol (ZYLOPRIM) 100 MG tablet Take 100 mg by mouth every morning       vitamin D (CHOLECALCIFEROL) 1000 UNIT TABS tablet Take 1,000 Units by mouth nightly       amLODIPine (NORVASC) 10 MG tablet Take 10 mg by mouth daily (Patient not taking: Reported on 2022)       No current facility-administered medications for this visit. Allergies   Allergen Reactions    Lisinopril Rash       ROS:   Constitutional: Negative for fever, activity change and appetite change. HENT: Negative for epistaxis. Eyes: Negative for diploplia, blurred vision.    Respiratory: Negative for cough, chest tightness, shortness of breath and wheezing. Cardiovascular: pertinent positives in HPI  Gastrointestinal: Negative for abdominal pain and blood in stool. All other review of systems are negative     PHYSICAL EXAM:   Vitals:    06/20/22 1430   BP: 124/86   Site: Left Upper Arm   Position: Sitting   Cuff Size: Large Adult   Pulse: 81   Resp: 16   Weight: (!) 388 lb 12.8 oz (176.4 kg)   Height: 6' (1.829 m)      Constitutional: Well-developed, no acute distress  Eyes: conjunctivae normal, no xanthelasma   Ears, Nose, Throat: oral mucosa moist, no cyanosis   CV: no JVD. RRR. Normal S1S2. No murmurs, rubs, or gallops. PMI is nondisplaced  Lungs: clear to auscultation bilaterally, normal respiratory effort without used of accessory muscles  Abdomen: soft, non-tender, bowel sounds present, no masses or hepatomegaly   Musculoskeletal: no digital clubbing, trace edema of LEs  Skin: warm, no rashes     I have personally reviewed the laboratory, cardiac diagnostic and radiographic testing as outlined below:    Data:    No results for input(s): WBC, HGB, HCT, PLT in the last 72 hours. No results for input(s): NA, K, CL, CO2, BUN, CREATININE, GLU, CALCIUM in the last 72 hours. Invalid input(s): MAGNESIUM   Lab Results   Component Value Date    MG 1.5 01/03/2022     No results for input(s): TSH in the last 72 hours. No results for input(s): INR in the last 72 hours. CXR: 5/30/21 [facility]  IMPRESSION:     No acute disease identified in the lungs or mediastinum. Specifically, I   detect no evidence of pneumothorax. There has been little interval change   since the exam dated 05/29/2021.            : PSCB     Transcribe Date/Time: May 30 2021  2:04P     Dictated by : Aristides Tapia MD     This examination was interpreted and the report reviewed and   electronically signed by:   Aristides Tapia MD on May 30 2021  2:06PM  EST    EKG 06/20/22:  SR w/ PVC, rate: 81 bpm, low voltage QRS, LAD, old anterior MI, QRS Melissa <= 34   LV ID (diastole)         4.9 cm (2D)        1.66 cm/m²   LV ID (systole)          5.4 cm (2D)        1.81 cm/m²   IVS, leaflet tips        1.3 cm (2D)   Posterior wall thickness 1.5 cm (2D)   Left ventricular mass    282 g (2D)         95 g/m²   LVOT stroke volume       159 ml             56 ml/m²   Ejection Fraction        65 % (visual est.)            EF > 52       FINDINGS:     LEFT VENTRICLE   The left ventricle is normal in size. Left ventricular systolic function is normal.   Left ventricular diastolic function was not evaluated due to AF. Wall Motion:   All scored segments are normal.         RIGHT VENTRICLE   The right ventricle is normal in size. Right ventricular systolic function is normal. RV systolic tissue Doppler velocity    is 15.0 cm/s. Estimated right ventricular systolic pressure is not reported due to an   insufficient tricuspid regurgitation signal. Estimated right atrial pressure is   not included as the IVC was not seen. LEFT ATRIUM   The left atrial cavity is severely dilated. RIGHT ATRIUM   The right atrial cavity is dilated. MITRAL VALVE   There is trace mitral valve regurgitation. There is no thickening. TRICUSPID VALVE   There is trace tricuspid valve regurgitation. There is no thickening. AORTIC VALVE   Contreras S3 prosthetic valve size #29. There is no aortic valve regurgitation. S/P   transcatheter aortic valve replacement. The peak gradient is 35 mmHg (peak   velocity = 294.2 cm/s). The mean gradient is 18 mmHg. The LVOT mean velocity is   84.7 cm/s. The LVOT diameter is 2.9 cm. The aortic VTI is 48.2 cm. The mean   velocity in the aortic valve is 197.5 cm/s. The dimensionless valve index is 0.50.    AV area is 3.30 cm² (1.11 cm²/m²) by continuity, VTI. The LVOT stroke volume   index is 56 ml/m². PULMONIC VALVE   There is trace pulmonic valve regurgitation. There is no thickening. AORTA   The aorta is unseen or not interrogated. PULMONARY ARTERIES   The pulmonary arteries are unseen or not interrogated. PERICARDIUM   There is a trivial pericardial effusion. There is an epicardial fat pad. Date: 21   Received From: [facility]          Cardiac Catheterization: 21 ([facility]- full report Care Everywhere)    PATIENT INFORMATION   +-------------------+     Name:        MR. Verner German   MRN:         [id number]   Accession #: [id number]   :         1955   Age:         66 years   Gender:      M   Height: 72 in / 183 cm   Weight: 460.00 lb / 208.66 kg   BMI:    62.31 kg/m²   BSA:    3.04 m²     Allergies: NKDA     +------------------------------+   CLINICAL HISTORY/INDICATION(s)   +------------------------------+     Severe chronic native or prosthetic aortic stenosis, patient asymptomatic related   to valvular disease; AUC score = 4Preoperative assessment before valvular surgery;    AUC score = 7. Procedural Status: Elective     CAD Presentation: No Symptoms, No Angina     Angina Classification (within 2 weeks): No Angina     No Heart Failure     77year old White male with a history of of severe AS, incomplete LBBB, DM2,   morbid obesity, CHAITANYA gout and newly diagnosed AF who presents for [facility] prior to TAVR    evaluation. Access Point        Sheath Size Hemostasis Method   Right Radial Artery 5F Short    Radial TR band       +-------------------+   DIAGNOSTIC FINDINGS   +-------------------+     Coronary Anatomy: Right Dominant     Injection Site(s): Coronary Artery     LMT: _ The LMT is normal.     LAD:   _ The LAD has mild luminal irregularities. LCX: _ The Circumflex is normal.     RAMUS: _ The Ramus is Absent. RCA:   _ RCA is normal.     +------------+   HEMODYNAMICS   +------------+       +---------------+   IMPRESSION/PLAN   +---------------+     Impression:   1. Right dominant system. 2. Mild non-obstructive CAD.    3. Unable to cross aortic valve with catheter due to radial/subclavian spasm. Recommended Treatment: Valve repair / replacement and Medical Therapy. Plan: TAVR vs SAVR evaluation. No indication for revascularization prior or at the    time of valve replacement.     +----------------------------------+   ADVERSE OUTCOME(s)/COMPLICATION(s)   +----------------------------------+     None     CTA of chest/abdomen/pelvis 4/8/21 ([facility])  IMPRESSION:     1.  Trileaflet aortic valve with severe cusp calcifications.  Aortic   valve orifice area = 0.8 cm2. The aortic annulus measures 3.0 x 2.6 cm; cross-sectional area = 5.9 cm2;   circumference = 8.8 cm. 2.  Normal caliber thoracic and abdominal aorta.  No acute aortic   pathology identified.  The pelvic arteries, including the common femoral   arteries are also normal in course, caliber, and contour.  The minimal   luminal caliber throughout = 0.9 cm, bilaterally. 3.  Mild right and severe left atrial enlargement.  Mild mitral annular   calcification. : PSCB     Transcribe Date/Time: Apr 8 2021  3:38P     Dictated by : Naomi Gutierrez MD     This examination was interpreted and the report reviewed and   electronically signed by:   Naomi Gutierrez MD on Apr 8 2021  5:43PM  EST     Date: 04/08/21   Received From: [address] [place] [address] Rd test/Cardiac MRI (Dr Renny Lanier)        Cardiac MRI 9/29/20:      I have independently reviewed all of the ECGs and rhythm strips per above     Assessment/Plan:  This is a 79 y.o. male with a history of   Patient Active Problem List   Diagnosis    Primary osteoarthritis of left hip    Primary osteoarthritis of left hip    Instability of right hip joint    Diverticulosis    Acute blood loss anemia    Diabetes mellitus type 2, controlled ([address]    Acute respiratory failure with hypoxia ([address]    Viral pneumonia    Morbid obesity with BMI of 50.0-59.9, adult ([address]    Bacteremia    Transaminitis    CHAITANYA (obstructive sleep apnea)    Atelectasis    Chronic atrial fibrillation ([facility]    Discharge planning issues    Disorder of aorta (HCC)    Dyspnea on exertion    Electrocardiogram abnormal    Aortic valve regurgitation    Morbid obesity (HCC)    On home O2    Postoperative hypertension    Stomach ulcer    Tobacco user    Persistent atrial fibrillation ([facility]     1. Persistent atrial fbrillation  - Documented AF per EKG at [facility] since 4/12/21 before TAVR 5/26/21  - Asymptomatic.   - [id number] score: 3  - On Eliquis 5 mg BID for stroke risk reduction.  - On Tikosyn for rhythm control.  - On Atenolol for rate control.  - TTE 7/16/21 ([facility]): Left ventricular  EF = 65 ± 5% (visual est.) The left atrial cavity is severely dilated and the right atrial cavity is dilated. - S/p successful DC-cardioversion on 1/3/2022.  - Presents today in NSR with stable QTc.  - CrCl by IBW of 98.37 mL/min based on Cr of 0.8 on 2/20/22  - Re-education on importance of well controlled HTN (goal BP < 130/80), adequate weight control (goal BMI of < 27), physical activity consisting of moderate cardiopulmonary exercise up to a goal of 250 min/wk, daily compliance with CPAP in treating sleep apnea, smoking cessation and limited ETOH intake. 2. Left anterior fascicular block  - Chronic. 3. Aortic valve disease  - Severe AS  - S/p TAVR 5/26/21 ([facility])  - TTE 7/16/21 ([facility]) - S/P transcatheter aortic valve replacement. Contreras S3 prosthetic aortic valve   (size #29). There is no aortic valve regurgitation. The peak gradient is 35 mmHg,   the mean gradient is 18 mmHg and the dimensionless valve index is 0.50. Prior AV   gradients of 28/16 mmHg  - Follows with Dr Janina Lock. 4. CHAITANYA  - Compliant with BiPAP. 5. Morbid obesity  - Encouraged aggressive weight loss. - Body mass index is 52.73 kg/m². 6. Diabetes mellitus  - On Metformin  - Management per PCP     7. Hypertension  - On Amlodipine and Atenolol. 8. ETOH usage    9. Prior tobacco usage    10.  History of syncope  - Suspected vasovagal from history. 11. Hyperuricemia  - On Allopurinol. Recommendations:    1. Continue Tikosyn 500 mcg every 12 hours  2. Continue Atenolol 25 mg daily for rate control. 3. Continue Eliquis 5 mg bid. 4. Aggressive weight loss and risk factor modifications. 5. Obtain EKG, BMP and magnesium every 3 to 6 months while taking Tikosyn  6. Follow up in 6 months or sooner PRN. Encouraged the patient to call the office for any questions or concerns. I have spent a total of 40 minutes with the patient and the family reviewing the above stated recommendations. And a total of >50% of that time involved face-to-face time providing counseling and/or coordination of care with the other providers, preparation for the clinic visit, reviewing records/tests, counseling/education of the patient, ordering medications/tests/procedures, coordinating care, and documenting clinical information in the EHR. Thank you for allowing me to participate in your patient's care. Please call me if there are any questions or concerns.       Tanisha Nogueira MD  Cardiac Electrophysiology  [address]   [facility] [place]  2:44 PM  6/20/2022

## 2022-06-17 NOTE — TELEPHONE ENCOUNTER
Requesting Tikosyn refill - CrCl calculated off the following information:    Tikosyn dosage: 500 mcg     Age: 67   Ht: 1.829 m  Wt: 192.8 kg  Cr: 0.80 mg/dl (based off labs on 04/2022)  CrCl: 98.37 mL/min    Last QTc: 421 msec (based on last EKG on 06/2022)

## 2022-06-20 ENCOUNTER — OFFICE VISIT (OUTPATIENT)
Dept: NON INVASIVE DIAGNOSTICS | Age: 67
End: 2022-06-20
Payer: MEDICARE

## 2022-06-20 VITALS
BODY MASS INDEX: 42.66 KG/M2 | RESPIRATION RATE: 16 BRPM | HEIGHT: 72 IN | DIASTOLIC BLOOD PRESSURE: 86 MMHG | HEART RATE: 81 BPM | WEIGHT: 315 LBS | SYSTOLIC BLOOD PRESSURE: 124 MMHG

## 2022-06-20 DIAGNOSIS — I48.20 CHRONIC ATRIAL FIBRILLATION (HCC): Primary | ICD-10-CM

## 2022-06-20 PROCEDURE — 93000 ELECTROCARDIOGRAM COMPLETE: CPT | Performed by: INTERNAL MEDICINE

## 2022-06-20 PROCEDURE — G8427 DOCREV CUR MEDS BY ELIG CLIN: HCPCS | Performed by: INTERNAL MEDICINE

## 2022-06-20 PROCEDURE — 1036F TOBACCO NON-USER: CPT | Performed by: INTERNAL MEDICINE

## 2022-06-20 PROCEDURE — 1124F ACP DISCUSS-NO DSCNMKR DOCD: CPT | Performed by: INTERNAL MEDICINE

## 2022-06-20 PROCEDURE — G8417 CALC BMI ABV UP PARAM F/U: HCPCS | Performed by: INTERNAL MEDICINE

## 2022-06-20 PROCEDURE — 3017F COLORECTAL CA SCREEN DOC REV: CPT | Performed by: INTERNAL MEDICINE

## 2022-06-20 PROCEDURE — 99215 OFFICE O/P EST HI 40 MIN: CPT | Performed by: INTERNAL MEDICINE

## 2022-06-20 RX ORDER — APIXABAN 5 MG/1
5 TABLET, FILM COATED ORAL 2 TIMES DAILY
COMMUNITY
Start: 2022-06-02

## 2022-10-03 ENCOUNTER — TELEPHONE (OUTPATIENT)
Dept: CARDIOLOGY | Age: 67
End: 2022-10-03

## 2022-10-11 ENCOUNTER — TELEPHONE (OUTPATIENT)
Dept: CARDIOLOGY | Age: 67
End: 2022-10-11

## 2022-10-12 ENCOUNTER — HOSPITAL ENCOUNTER (OUTPATIENT)
Dept: CARDIOLOGY | Age: 67
Discharge: HOME | End: 2022-10-12
Payer: MEDICARE

## 2022-10-12 LAB
LV EF: 68 %
LVEF MODALITY: NORMAL

## 2022-10-12 PROCEDURE — 6360000004 HC RX CONTRAST MEDICATION: Performed by: FAMILY MEDICINE

## 2022-10-12 PROCEDURE — 2580000003 HC RX 258: Performed by: INTERNAL MEDICINE

## 2022-10-12 PROCEDURE — 93306 TTE W/DOPPLER COMPLETE: CPT

## 2022-10-12 RX ORDER — SODIUM CHLORIDE 0.9 % (FLUSH) 0.9 %
10 SYRINGE (ML) INJECTION PRN
Status: DISCONTINUED | OUTPATIENT
Start: 2022-10-12 | End: 2022-10-13 | Stop reason: HOSPADM

## 2022-10-12 RX ADMIN — PERFLUTREN 1.1 MG: 6.52 INJECTION, SUSPENSION INTRAVENOUS at 13:42

## 2022-10-12 RX ADMIN — SODIUM CHLORIDE, PRESERVATIVE FREE 10 ML: 5 INJECTION INTRAVENOUS at 13:42

## 2022-12-27 DIAGNOSIS — Z79.899 VISIT FOR MONITORING TIKOSYN THERAPY: Primary | ICD-10-CM

## 2022-12-27 DIAGNOSIS — Z51.81 VISIT FOR MONITORING TIKOSYN THERAPY: Primary | ICD-10-CM

## 2022-12-27 DIAGNOSIS — I48.20 CHRONIC ATRIAL FIBRILLATION (HCC): ICD-10-CM

## 2022-12-27 RX ORDER — DOFETILIDE 0.5 MG/1
CAPSULE ORAL
Qty: 60 CAPSULE | Refills: 0 | Status: SHIPPED | OUTPATIENT
Start: 2022-12-27

## 2022-12-30 DIAGNOSIS — Z79.899 VISIT FOR MONITORING TIKOSYN THERAPY: ICD-10-CM

## 2022-12-30 DIAGNOSIS — I48.20 CHRONIC ATRIAL FIBRILLATION (HCC): ICD-10-CM

## 2022-12-30 DIAGNOSIS — Z51.81 VISIT FOR MONITORING TIKOSYN THERAPY: ICD-10-CM

## 2023-01-24 ENCOUNTER — TELEPHONE (OUTPATIENT)
Dept: NON INVASIVE DIAGNOSTICS | Age: 68
End: 2023-01-24

## 2023-01-24 NOTE — TELEPHONE ENCOUNTER
Left message to call the office back    CK pt,overdue 6 mo OV (12/20/2022) no device on Tikosyn w/ NP

## 2023-01-30 DIAGNOSIS — I48.20 CHRONIC ATRIAL FIBRILLATION (HCC): ICD-10-CM

## 2023-01-30 DIAGNOSIS — Z51.81 VISIT FOR MONITORING TIKOSYN THERAPY: ICD-10-CM

## 2023-01-30 DIAGNOSIS — Z51.81 VISIT FOR MONITORING TIKOSYN THERAPY: Primary | ICD-10-CM

## 2023-01-30 DIAGNOSIS — Z79.899 VISIT FOR MONITORING TIKOSYN THERAPY: Primary | ICD-10-CM

## 2023-01-30 DIAGNOSIS — Z79.899 VISIT FOR MONITORING TIKOSYN THERAPY: ICD-10-CM

## 2023-01-30 LAB
AVERAGE GLUCOSE: 346
BUN BLDV-MCNC: 24 MG/DL
CALCIUM SERPL-MCNC: 8.9 MG/DL
CHLORIDE BLD-SCNC: 95 MMOL/L
CO2: 27 MMOL/L
CREAT SERPL-MCNC: 0.76 MG/DL
GFR SERPL CREATININE-BSD FRML MDRD: 99 ML/MIN/{1.73_M2}
GLUCOSE BLD-MCNC: 382 MG/DL
HBA1C MFR BLD: 13.7 %
POTASSIUM SERPL-SCNC: 4.8 MMOL/L
SODIUM BLD-SCNC: 133 MMOL/L

## 2023-01-30 RX ORDER — DOFETILIDE 0.5 MG/1
CAPSULE ORAL
Qty: 60 CAPSULE | Refills: 5 | Status: SHIPPED | OUTPATIENT
Start: 2023-01-30

## 2023-02-06 RX ORDER — ATENOLOL 50 MG/1
25 TABLET ORAL DAILY
Qty: 90 TABLET | Refills: 3 | Status: SHIPPED | OUTPATIENT
Start: 2023-02-06

## 2023-03-22 NOTE — PROGRESS NOTES
Constitutional: Negative for fever, activity change and appetite change. HENT: Negative for epistaxis. Eyes: Negative for diploplia, blurred vision. Respiratory: Negative for cough, chest tightness, shortness of breath and wheezing. Cardiovascular: pertinent positives in HPI  Gastrointestinal: Negative for abdominal pain and blood in stool. All other review of systems are negative     PHYSICAL EXAM:   Vitals:    03/23/23 1452   BP: (!) 144/86   Site: Left Upper Arm   Position: Sitting   Cuff Size: Medium Adult   Resp: 18   Weight: (!) 404 lb (183.3 kg)   Height: 6' (1.829 m)     Constitutional: Well-developed, no acute distress  Eyes: conjunctivae normal, no xanthelasma   Ears, Nose, Throat: oral mucosa moist, no cyanosis   CV: no JVD. RRR. Normal S1S2. No murmurs, rubs, or gallops. PMI is nondisplaced  Lungs: clear to auscultation bilaterally, normal respiratory effort without used of accessory muscles  Abdomen: soft, non-tender, bowel sounds present, no masses or hepatomegaly   Musculoskeletal: no digital clubbing, trace edema of LEs  Skin: warm, no rashes     I have personally reviewed the laboratory, cardiac diagnostic and radiographic testing as outlined below:    Data:    No results for input(s): WBC, HGB, HCT, PLT in the last 72 hours. No results for input(s): NA, K, CL, CO2, BUN, CREATININE, GLU, CALCIUM in the last 72 hours. Invalid input(s): MAGNESIUM   Lab Results   Component Value Date/Time    MG 1.5 01/03/2022 09:54 AM     No results for input(s): TSH in the last 72 hours. No results for input(s): INR in the last 72 hours. EKG 03/23/23:  NSR, rate: 87 bpm, low voltage QRS, LAD, old anterior MI, , QTc 401 ms. Please see scan in Cardiology. Echocardiogram 7/16/21: [facility]  CONCLUSIONS:   - Exam indication: s/p TAVR   - The left ventricle is normal in size. Left ventricular systolic function is   normal. EF = 65 ± 5% (visual est.)   - The right ventricle is normal in size.  Right

## 2023-03-23 ENCOUNTER — OFFICE VISIT (OUTPATIENT)
Dept: NON INVASIVE DIAGNOSTICS | Age: 68
End: 2023-03-23
Payer: MEDICARE

## 2023-03-23 VITALS
SYSTOLIC BLOOD PRESSURE: 144 MMHG | WEIGHT: 315 LBS | DIASTOLIC BLOOD PRESSURE: 86 MMHG | HEIGHT: 72 IN | RESPIRATION RATE: 18 BRPM | BODY MASS INDEX: 42.66 KG/M2

## 2023-03-23 DIAGNOSIS — I48.20 CHRONIC ATRIAL FIBRILLATION (HCC): Primary | ICD-10-CM

## 2023-03-23 DIAGNOSIS — I48.20 CHRONIC ATRIAL FIBRILLATION (HCC): ICD-10-CM

## 2023-03-23 LAB
ALBUMIN SERPL-MCNC: 4.2 G/DL
ALP BLD-CCNC: 128 U/L
ALT SERPL-CCNC: 19 U/L
ANION GAP SERPL CALCULATED.3IONS-SCNC: 12 MMOL/L
AST SERPL-CCNC: 19 U/L
AVERAGE GLUCOSE: 280
BASOPHILS ABSOLUTE: 0.08 /ΜL
BASOPHILS RELATIVE PERCENT: 0.8 %
BILIRUB SERPL-MCNC: 0.6 MG/DL (ref 0.1–1.4)
BUN BLDV-MCNC: 19 MG/DL
CALCIUM SERPL-MCNC: 9.6 MG/DL
CHLORIDE BLD-SCNC: 99 MMOL/L
CO2: 27 MMOL/L
CREAT SERPL-MCNC: 0.76 MG/DL
EGFR: 99
EOSINOPHILS ABSOLUTE: 0.43 /ΜL
EOSINOPHILS RELATIVE PERCENT: 4.2 %
GLUCOSE BLD-MCNC: 199 MG/DL
HBA1C MFR BLD: 11.4 %
HCT VFR BLD CALC: 44.3 % (ref 41–53)
HEMOGLOBIN: 14.9 G/DL (ref 13.5–17.5)
LYMPHOCYTES ABSOLUTE: 2.86 /ΜL
LYMPHOCYTES RELATIVE PERCENT: 27.9 %
MCH RBC QN AUTO: 30.2 PG
MCHC RBC AUTO-ENTMCNC: 33.6 G/DL
MCV RBC AUTO: 89.9 FL
MONOCYTES ABSOLUTE: 0.77 /ΜL
MONOCYTES RELATIVE PERCENT: 7.5 %
NEUTROPHILS ABSOLUTE: 5.97 /ΜL
NEUTROPHILS RELATIVE PERCENT: 58.2 %
PDW BLD-RTO: 13.4 %
PLATELET # BLD: 138 K/ΜL
PMV BLD AUTO: 11.7 FL
POTASSIUM SERPL-SCNC: 5.1 MMOL/L
RBC # BLD: 4.93 10^6/ΜL
SODIUM BLD-SCNC: 138 MMOL/L
TOTAL PROTEIN: 6.7
WBC # BLD: 10.3 10^3/ML

## 2023-03-23 PROCEDURE — 1036F TOBACCO NON-USER: CPT | Performed by: INTERNAL MEDICINE

## 2023-03-23 PROCEDURE — 93000 ELECTROCARDIOGRAM COMPLETE: CPT | Performed by: INTERNAL MEDICINE

## 2023-03-23 PROCEDURE — G8484 FLU IMMUNIZE NO ADMIN: HCPCS | Performed by: INTERNAL MEDICINE

## 2023-03-23 PROCEDURE — G8417 CALC BMI ABV UP PARAM F/U: HCPCS | Performed by: INTERNAL MEDICINE

## 2023-03-23 PROCEDURE — 3017F COLORECTAL CA SCREEN DOC REV: CPT | Performed by: INTERNAL MEDICINE

## 2023-03-23 PROCEDURE — G8427 DOCREV CUR MEDS BY ELIG CLIN: HCPCS | Performed by: INTERNAL MEDICINE

## 2023-03-23 PROCEDURE — 99215 OFFICE O/P EST HI 40 MIN: CPT | Performed by: INTERNAL MEDICINE

## 2023-03-23 PROCEDURE — 1124F ACP DISCUSS-NO DSCNMKR DOCD: CPT | Performed by: INTERNAL MEDICINE

## 2023-03-23 RX ORDER — MULTIVIT WITH MINERALS/LUTEIN
1000 TABLET ORAL DAILY
COMMUNITY

## 2023-08-03 DIAGNOSIS — I48.20 CHRONIC ATRIAL FIBRILLATION (HCC): Primary | ICD-10-CM

## 2023-08-03 DIAGNOSIS — Z51.81 VISIT FOR MONITORING TIKOSYN THERAPY: ICD-10-CM

## 2023-08-03 DIAGNOSIS — Z79.899 VISIT FOR MONITORING TIKOSYN THERAPY: ICD-10-CM

## 2023-08-03 RX ORDER — DOFETILIDE 0.5 MG/1
CAPSULE ORAL
Qty: 60 CAPSULE | Refills: 0 | Status: SHIPPED | OUTPATIENT
Start: 2023-08-03

## 2023-08-03 NOTE — TELEPHONE ENCOUNTER
Patient will get labs and ekg done at Dr. Amina Parr office in a few days. Patient has two days of medication left. Orders in [facility].     Electronically signed by Bhanu Koch MA on 8/3/2023 at 3:54 PM

## 2023-08-16 RX ORDER — DOFETILIDE 0.5 MG/1
CAPSULE ORAL
Qty: 90 CAPSULE | Refills: 0 | Status: SHIPPED | OUTPATIENT
Start: 2023-08-16

## 2023-08-16 NOTE — TELEPHONE ENCOUNTER
Request: Fax from Pharmacy to refill Tikosyn    Last visit: 3/23/23 with Dr Vicki Ho  Next visit: No Scheduled visit, on recall list    Provider: Dr Kang Mendez: 8/11/23     [facility]

## 2023-08-22 RX ORDER — DOFETILIDE 0.5 MG/1
CAPSULE ORAL
Qty: 60 CAPSULE | OUTPATIENT
Start: 2023-08-22

## 2023-08-23 ENCOUNTER — TELEPHONE (OUTPATIENT)
Dept: NON INVASIVE DIAGNOSTICS | Age: 68
End: 2023-08-23

## 2023-08-23 NOTE — TELEPHONE ENCOUNTER
Returned VM - patient did not answer so LM to call office.      Electronically signed by Zack Mao MA on 8/23/2023 at 11:49 AM

## 2023-08-24 RX ORDER — DOFETILIDE 0.5 MG/1
CAPSULE ORAL
Qty: 180 CAPSULE | Refills: 1 | Status: SHIPPED | OUTPATIENT
Start: 2023-08-24

## 2024-02-09 RX ORDER — ATENOLOL 50 MG/1
TABLET ORAL
Qty: 45 TABLET | Refills: 3 | Status: SHIPPED | OUTPATIENT
Start: 2024-02-09

## 2024-02-23 ENCOUNTER — OFFICE VISIT (OUTPATIENT)
Dept: NON INVASIVE DIAGNOSTICS | Age: 69
End: 2024-02-23
Payer: MEDICARE

## 2024-02-23 VITALS
DIASTOLIC BLOOD PRESSURE: 84 MMHG | HEART RATE: 84 BPM | RESPIRATION RATE: 18 BRPM | SYSTOLIC BLOOD PRESSURE: 138 MMHG | WEIGHT: 315 LBS | BODY MASS INDEX: 42.66 KG/M2 | HEIGHT: 72 IN

## 2024-02-23 DIAGNOSIS — Z79.899 VISIT FOR MONITORING TIKOSYN THERAPY: ICD-10-CM

## 2024-02-23 DIAGNOSIS — R06.09 DYSPNEA ON EXERTION: ICD-10-CM

## 2024-02-23 DIAGNOSIS — R94.31 ABNORMAL EKG: ICD-10-CM

## 2024-02-23 DIAGNOSIS — E66.01 MORBID OBESITY (HCC): ICD-10-CM

## 2024-02-23 DIAGNOSIS — Z79.01 ANTICOAGULATED: ICD-10-CM

## 2024-02-23 DIAGNOSIS — I48.20 CHRONIC ATRIAL FIBRILLATION (HCC): Primary | ICD-10-CM

## 2024-02-23 DIAGNOSIS — Z51.81 VISIT FOR MONITORING TIKOSYN THERAPY: ICD-10-CM

## 2024-02-23 DIAGNOSIS — G47.33 OSA (OBSTRUCTIVE SLEEP APNEA): ICD-10-CM

## 2024-02-23 PROCEDURE — 99214 OFFICE O/P EST MOD 30 MIN: CPT | Performed by: NURSE PRACTITIONER

## 2024-02-23 PROCEDURE — 1124F ACP DISCUSS-NO DSCNMKR DOCD: CPT | Performed by: NURSE PRACTITIONER

## 2024-02-23 RX ORDER — INSULIN GLARGINE 300 U/ML
INJECTION, SOLUTION SUBCUTANEOUS
COMMUNITY

## 2024-02-23 NOTE — PROGRESS NOTES
Did have symptoms in the past   Does not think that he has had any recurrence of AFib   Has had labs March 4 by PCP and will request them     
On Eliquis 5 mg BID for stroke risk reduction.  - On Tikosyn for rhythm control.  - On Atenolol for rate control.  - TTE 7/16/21 ([facility]): Left ventricular  EF = 65 ± 5% (visual est.) The left atrial cavity is severely dilated and the right atrial cavity is dilated.   - S/p successful DC-cardioversion on 1/3/2022.  - Presents today in NSR with stable QTc.  - CrCl by IBW of 103.55 mL/min based on Cr of 0.76 on 2/6/23    2. Left anterior fascicular block  - Chronic.     3. Aortic valve disease  - Severe AS  - S/p TAVR 5/26/21 ([facility])  - TTE 7/16/21 ([facility]) - S/P transcatheter aortic valve replacement. Contreras S3 prosthetic aortic valve   (size #29). There is no aortic valve regurgitation. The peak gradient is 35 mmHg,   the mean gradient is 18 mmHg and the dimensionless valve index is 0.50. Prior AV   gradients of 28/16 mmHg  - Follows with Dr Paz.     4. CHAITANYA  - Compliant with BiPAP.     5. Morbid obesity  - Encouraged aggressive weight loss.  Body mass index is 57.37 kg/m².     6. Diabetes mellitus 2   - On Metformin  - Management per PCP      7. Hypertension  - Elevated.  - On Amlodipine and Atenolol.     8. ETOH usage     9. Prior tobacco usage     10. History of syncope  - Suspected vasovagal from history.     11. Hyperuricemia  - On Allopurinol.     Recommendations:     1. Continue Tikosyn 500 mcg every 12 hours.  2. Continue Atenolol 25 mg daily.  3. Continue Eliquis 5 mg BID.  4. Aggressive weight loss and risk factor modifications.   5. Obtain EKG, BMP and magnesium every 3 to 6 months while taking Tikosyn  6. Follow up in 6 months or sooner PRN. Encouraged the patient to call the office for any questions or concerns.  7.  Labs to be done soon as PCP on March 4, will request labs if they are not entered in the system.    Re-education on importance of well controlled HTN (goal BP < 130/80), adequate weight control (goal BMI of < 27), physical activity consisting of moderate cardiopulmonary exercise up to a goal

## 2024-04-19 ENCOUNTER — TELEPHONE (OUTPATIENT)
Dept: CARDIOLOGY | Age: 69
End: 2024-04-19

## 2024-04-22 ENCOUNTER — HOSPITAL ENCOUNTER (OUTPATIENT)
Dept: CARDIOLOGY | Age: 69
Discharge: HOME | End: 2024-04-24
Payer: MEDICARE

## 2024-04-22 VITALS
SYSTOLIC BLOOD PRESSURE: 138 MMHG | HEIGHT: 72 IN | BODY MASS INDEX: 42.66 KG/M2 | DIASTOLIC BLOOD PRESSURE: 84 MMHG | WEIGHT: 315 LBS

## 2024-04-22 DIAGNOSIS — R94.31 ABNORMAL EKG: ICD-10-CM

## 2024-04-22 LAB
ECHO AO ASC DIAM: 3.9 CM
ECHO AO ASCENDING AORTA INDEX: 1.33 CM/M2
ECHO AV AREA PEAK VELOCITY: 1.2 CM2
ECHO AV AREA VTI: 1.3 CM2
ECHO AV AREA/BSA PEAK VELOCITY: 0.4 CM2/M2
ECHO AV AREA/BSA VTI: 0.4 CM2/M2
ECHO AV CUSP MM: 2 CM
ECHO AV MEAN GRADIENT: 22 MMHG
ECHO AV MEAN VELOCITY: 2.3 M/S
ECHO AV PEAK GRADIENT: 38 MMHG
ECHO AV PEAK VELOCITY: 3.1 M/S
ECHO AV VELOCITY RATIO: 0.35
ECHO AV VTI: 66.7 CM
ECHO BSA: 3.12 M2
ECHO LA DIAMETER INDEX: 1.13 CM/M2
ECHO LA DIAMETER: 3.3 CM
ECHO LA VOL A-L A2C: 139 ML (ref 18–58)
ECHO LA VOL A-L A4C: 125 ML (ref 18–58)
ECHO LA VOL MOD A2C: 115 ML (ref 18–58)
ECHO LA VOL MOD A4C: 112 ML (ref 18–58)
ECHO LA VOLUME AREA LENGTH: 135 ML
ECHO LA VOLUME INDEX A-L A2C: 47 ML/M2 (ref 16–34)
ECHO LA VOLUME INDEX A-L A4C: 43 ML/M2 (ref 16–34)
ECHO LA VOLUME INDEX AREA LENGTH: 46 ML/M2 (ref 16–34)
ECHO LA VOLUME INDEX MOD A2C: 39 ML/M2 (ref 16–34)
ECHO LA VOLUME INDEX MOD A4C: 38 ML/M2 (ref 16–34)
ECHO LV EF PHYSICIAN: 60 %
ECHO LV FRACTIONAL SHORTENING: 29 % (ref 28–44)
ECHO LV INTERNAL DIMENSION DIASTOLE INDEX: 2.12 CM/M2
ECHO LV INTERNAL DIMENSION DIASTOLIC: 6.2 CM (ref 4.2–5.9)
ECHO LV INTERNAL DIMENSION SYSTOLIC INDEX: 1.5 CM/M2
ECHO LV INTERNAL DIMENSION SYSTOLIC: 4.4 CM
ECHO LV ISOVOLUMETRIC RELAXATION TIME (IVRT): 110.7 MS
ECHO LV IVSD: 0.5 CM (ref 0.6–1)
ECHO LV IVSS: 1.1 CM
ECHO LV MASS 2D: 112.9 G (ref 88–224)
ECHO LV MASS INDEX 2D: 38.5 G/M2 (ref 49–115)
ECHO LV POSTERIOR WALL DIASTOLIC: 0.5 CM (ref 0.6–1)
ECHO LV POSTERIOR WALL SYSTOLIC: 1.1 CM
ECHO LV RELATIVE WALL THICKNESS RATIO: 0.16
ECHO LVOT AREA: 3.5 CM2
ECHO LVOT AV VTI INDEX: 0.39
ECHO LVOT DIAM: 2.1 CM
ECHO LVOT MEAN GRADIENT: 3 MMHG
ECHO LVOT PEAK GRADIENT: 5 MMHG
ECHO LVOT PEAK VELOCITY: 1.1 M/S
ECHO LVOT STROKE VOLUME INDEX: 30.4 ML/M2
ECHO LVOT SV: 89 ML
ECHO LVOT VTI: 25.7 CM
ECHO MV "A" WAVE DURATION: 90 MSEC
ECHO MV A VELOCITY: 1.42 M/S
ECHO MV AREA PHT: 2.1 CM2
ECHO MV AREA VTI: 1.9 CM2
ECHO MV E DECELERATION TIME (DT): 373.9 MS
ECHO MV E VELOCITY: 1.17 M/S
ECHO MV E/A RATIO: 0.82
ECHO MV LVOT VTI INDEX: 1.81
ECHO MV MAX VELOCITY: 1.6 M/S
ECHO MV MEAN GRADIENT: 4 MMHG
ECHO MV MEAN VELOCITY: 0.9 M/S
ECHO MV PEAK GRADIENT: 10 MMHG
ECHO MV PRESSURE HALF TIME (PHT): 103.7 MS
ECHO MV VTI: 46.6 CM
ECHO PVEIN A DURATION: 51.9 MS
ECHO PVEIN A VELOCITY: 0.3 M/S
ECHO PVEIN PEAK D VELOCITY: 0.3 M/S
ECHO PVEIN PEAK S VELOCITY: 0.7 M/S
ECHO PVEIN S/D RATIO: 2.3

## 2024-04-22 PROCEDURE — 2580000003 HC RX 258: Performed by: INTERNAL MEDICINE

## 2024-04-22 PROCEDURE — 6360000004 HC RX CONTRAST MEDICATION: Performed by: NURSE PRACTITIONER

## 2024-04-22 PROCEDURE — 93306 TTE W/DOPPLER COMPLETE: CPT

## 2024-04-22 PROCEDURE — 93306 TTE W/DOPPLER COMPLETE: CPT | Performed by: INTERNAL MEDICINE

## 2024-04-22 RX ORDER — SODIUM CHLORIDE 0.9 % (FLUSH) 0.9 %
10 SYRINGE (ML) INJECTION PRN
Status: DISCONTINUED | OUTPATIENT
Start: 2024-04-22 | End: 2024-04-25 | Stop reason: HOSPADM

## 2024-04-22 RX ADMIN — PERFLUTREN 1.5 ML: 6.52 INJECTION, SUSPENSION INTRAVENOUS at 10:06

## 2024-04-22 RX ADMIN — SODIUM CHLORIDE, PRESERVATIVE FREE 10 ML: 5 INJECTION INTRAVENOUS at 10:10

## 2024-05-03 RX ORDER — DOFETILIDE 0.5 MG/1
CAPSULE ORAL
Qty: 60 CAPSULE | Refills: 0 | Status: SHIPPED | OUTPATIENT
Start: 2024-05-03

## 2024-06-17 RX ORDER — DOFETILIDE 0.5 MG/1
CAPSULE ORAL
Qty: 180 CAPSULE | Refills: 1 | Status: SHIPPED | OUTPATIENT
Start: 2024-06-17

## 2024-08-07 NOTE — PROGRESS NOTES
[facility]- [facility] Elect[facility]iology  Outpatient Progress Note  PATIENT: Dayron Kennedy  MEDICAL RECORD NUMBER: [id number]  DATE OF SERVICE:  8/8/2024  ATTENDING ELECTROPHYSIOLOGIST: Socoror Aleman MD  REFERRING PHYSICIAN: No ref. provider found and Jose Butler MD  CHIEF COMPLAINT: Persistent atrial fibrillation    HPI: This is a 69 y.o. male with a history of   Patient Active Problem List   Diagnosis    Primary osteoarthritis of left hip    Primary osteoarthritis of left hip    Instability of right hip joint    Diverticulosis    Acute blood loss anemia    Diabetes mellitus type 2, controlled (HCC)    Acute respiratory failure with hypoxia (HCC)    Viral pneumonia    Morbid obesity with BMI of 50.0-59.9, adult (HCC)    Bacteremia    Transaminitis    CHAITANYA (obstructive sleep apnea)    Atelectasis    Chronic atrial fibrillation (HCC)    Discharge planning issues    Disorder of aorta (HCC)    Dyspnea on exertion    Electrocardiogram abnormal    Aortic valve regurgitation    Morbid obesity (HCC)    On home O2    Postoperative hypertension    Stomach ulcer    Tobacco user    Persistent atrial fibrillation (HCC)   Dayron Kennedy presents today to the electrophysiology clinic for follow up and management of persistent atrial fibrillation on Tikosyn. He was last seen by ANTONIO Chatterjee on 2/23/24. Since his last office visit, he reports feeling overall well. He denies any recurrent AF. The patient denies any chest pain, dyspnea, palpitations, dizziness, syncope, orthopnea or paroxysmal nocturnal dyspnea. He presents to NSR with stable Qtc. He continues on Tikosyn for rhythm control, Atenolol for rate control and Eliquis for stroke risk reduction.      Patient Active Problem List    Diagnosis Date Noted    Persistent atrial fibrillation (HCC) 12/08/2021    Discharge planning issues 11/03/2021     Overview Note:     Formatting of this note might be different from the

## 2024-08-08 ENCOUNTER — OFFICE VISIT (OUTPATIENT)
Dept: NON INVASIVE DIAGNOSTICS | Age: 69
End: 2024-08-08

## 2024-08-08 VITALS
WEIGHT: 315 LBS | RESPIRATION RATE: 20 BRPM | HEART RATE: 94 BPM | HEIGHT: 71 IN | SYSTOLIC BLOOD PRESSURE: 152 MMHG | BODY MASS INDEX: 44.1 KG/M2 | DIASTOLIC BLOOD PRESSURE: 76 MMHG

## 2024-08-08 DIAGNOSIS — I48.20 CHRONIC ATRIAL FIBRILLATION (HCC): Primary | ICD-10-CM

## 2024-08-08 RX ORDER — ATENOLOL 50 MG/1
50 TABLET ORAL DAILY
Qty: 90 TABLET | Refills: 1 | Status: SHIPPED | OUTPATIENT
Start: 2024-08-08

## 2025-03-26 RX ORDER — DOFETILIDE 0.5 MG/1
CAPSULE ORAL
Qty: 30 CAPSULE | Refills: 0 | Status: SHIPPED | OUTPATIENT
Start: 2025-03-26

## 2025-03-26 NOTE — TELEPHONE ENCOUNTER
Spoke with patient he request a 30 day supply to [facility] and when he comes in for his apt next week he will have labs done then

## 2025-04-03 ENCOUNTER — OFFICE VISIT (OUTPATIENT)
Dept: NON INVASIVE DIAGNOSTICS | Age: 70
End: 2025-04-03
Payer: MEDICARE

## 2025-04-03 VITALS
OXYGEN SATURATION: 92 % | TEMPERATURE: 97.3 F | HEIGHT: 71 IN | WEIGHT: 315 LBS | DIASTOLIC BLOOD PRESSURE: 70 MMHG | SYSTOLIC BLOOD PRESSURE: 138 MMHG | BODY MASS INDEX: 44.1 KG/M2 | RESPIRATION RATE: 16 BRPM | HEART RATE: 63 BPM

## 2025-04-03 DIAGNOSIS — Z79.899 VISIT FOR MONITORING TIKOSYN THERAPY: ICD-10-CM

## 2025-04-03 DIAGNOSIS — I48.0 PAROXYSMAL ATRIAL FIBRILLATION (HCC): ICD-10-CM

## 2025-04-03 DIAGNOSIS — Z79.01 ANTICOAGULATED: ICD-10-CM

## 2025-04-03 DIAGNOSIS — Z91.89 AT RISK FOR STROKE: ICD-10-CM

## 2025-04-03 DIAGNOSIS — Z51.81 VISIT FOR MONITORING TIKOSYN THERAPY: ICD-10-CM

## 2025-04-03 DIAGNOSIS — Z91.199 NONCOMPLIANCE: ICD-10-CM

## 2025-04-03 DIAGNOSIS — I48.20 CHRONIC ATRIAL FIBRILLATION (HCC): Primary | ICD-10-CM

## 2025-04-03 DIAGNOSIS — I35.0 NONRHEUMATIC AORTIC VALVE STENOSIS: ICD-10-CM

## 2025-04-03 DIAGNOSIS — E66.01 MORBID OBESITY: ICD-10-CM

## 2025-04-03 PROCEDURE — 1124F ACP DISCUSS-NO DSCNMKR DOCD: CPT | Performed by: NURSE PRACTITIONER

## 2025-04-03 PROCEDURE — 1159F MED LIST DOCD IN RCRD: CPT | Performed by: NURSE PRACTITIONER

## 2025-04-03 PROCEDURE — 99215 OFFICE O/P EST HI 40 MIN: CPT | Performed by: NURSE PRACTITIONER

## 2025-04-03 PROCEDURE — 93000 ELECTROCARDIOGRAM COMPLETE: CPT | Performed by: INTERNAL MEDICINE

## 2025-04-03 NOTE — PATIENT INSTRUCTIONS
Please give 2 weeks of samples of eliquis 5 mg BID     Can you please give him information on eliquis assistance also.

## 2025-04-03 NOTE — PROGRESS NOTES
[facility]  Outpatient Progress Note  Dayron Kennedy  1955  Date of Service: 4/3/2025  PCP: Jose Butler MD  Electrophysiologist: Dr. Aleman         Subjective: Dayron Kennedy is seen for follow-up and management of: Atrial fibrillation on Tikosyn    Last seen in the office with Dr. Aleman on 3/23/2023    PMH as noted below significant for VHD: mod/severe AS-s/p TAVR ([facility]) 5/26/21, mild TR; trace MR; persistent AF on OAC; DM; CHAITANYA; morbid obesity; BERNAL.  After the TAVR, he was in AF on Eliquis. [facility] recommended an EP referral and the patient opted to follow locally. The patient reports he did have syncopal episode after drinking at a bar, he does report consuming ETOH at that time. On 12/8/21, he was admitted for elective admission for Tikosyn initiation.  1/3/22: He was noted to have recurrent AF on 12/17/21 and underwent successful DC-cardioversion on 1/3/2022. He reports feeling overall well from a EP POV, but also notes that he did not know when he was in afib in the past.   Patient states that he has been  off of eliquis for 2 months due to cost and \"rules of [facility]\" that make him pay upfront for meds first at the beginning of the year.   Has not told anyone or tried to get samples.   Patient is in atrial fib today in the office with controlled rates. He is unaware of the afib, he states that he did not know that he could go in and out. He does not know when this started,. His last EKG on file was last August.   He is in wheelchair today, admits to being deconditioned due to the weather and has not done much activity. But he is not wheelchair bound and does ambulate at home.       Patient Active Problem List   Diagnosis    Primary osteoarthritis of left hip    Primary osteoarthritis of left hip    Instability of right hip joint    Diverticulosis    Acute blood loss anemia    Diabetes mellitus type 2, controlled (HCC)    Acute

## 2025-04-03 NOTE — PROGRESS NOTES
Patient was seen today and ZIO XT 14 day monitor was placed.  Monitor was ordered by Carlene JORDAN.  Monitor was applied and instructions given to patient.  Patient stated understanding and gave verbalized readback.    Monitor company: [employer]T 14 day  Serial number : [id number]    Electronically signed by TONI RODRIGEZ MA on 4/3/2025 at 9:20 AM

## 2025-04-14 DIAGNOSIS — I48.0 PAROXYSMAL ATRIAL FIBRILLATION (HCC): Primary | ICD-10-CM

## 2025-04-14 RX ORDER — DOFETILIDE 0.5 MG/1
CAPSULE ORAL
Qty: 60 CAPSULE | Refills: 0 | Status: SHIPPED | OUTPATIENT
Start: 2025-04-14

## 2025-04-17 LAB
ALBUMIN: 4.1 G/DL
ALP BLD-CCNC: 101 U/L
ALT SERPL-CCNC: 16 U/L
ANION GAP SERPL CALCULATED.3IONS-SCNC: 17 MMOL/L
AST SERPL-CCNC: 16 U/L
BASOPHILS ABSOLUTE: 0.07 /ΜL
BASOPHILS RELATIVE PERCENT: 0.6 %
BILIRUB SERPL-MCNC: 0.6 MG/DL (ref 0.1–1.4)
BUN BLDV-MCNC: 15 MG/DL
CALCIUM SERPL-MCNC: 8.7 MG/DL
CHLORIDE BLD-SCNC: 101 MMOL/L
CO2: 23 MMOL/L
CREAT SERPL-MCNC: 0.86 MG/DL
EOSINOPHILS ABSOLUTE: 0.67 /ΜL
EOSINOPHILS RELATIVE PERCENT: 6.2 %
GFR, ESTIMATED: 93
GLUCOSE BLD-MCNC: 248 MG/DL
HCT VFR BLD CALC: 43.9 % (ref 41–53)
HEMOGLOBIN: 14.4 G/DL (ref 13.5–17.5)
LYMPHOCYTES ABSOLUTE: 2.92 /ΜL
LYMPHOCYTES RELATIVE PERCENT: 27.1 %
MCH RBC QN AUTO: 30.1 PG
MCHC RBC AUTO-ENTMCNC: 32.8 G/DL
MCV RBC AUTO: 92 FL
MONOCYTES ABSOLUTE: 0.71 /ΜL
MONOCYTES RELATIVE PERCENT: 6.6 %
NEUTROPHILS ABSOLUTE: 6.37 /ΜL
NEUTROPHILS RELATIVE PERCENT: 59.1 %
PDW BLD-RTO: 14.4 %
PLATELET # BLD: 171 K/ΜL
PMV BLD AUTO: NORMAL FL
POTASSIUM SERPL-SCNC: 4.4 MMOL/L
RBC # BLD: 4.78 10^6/ΜL
SODIUM BLD-SCNC: 141 MMOL/L
TOTAL PROTEIN: 6.8 G/DL (ref 6.4–8.2)
WBC # BLD: 10.8 10^3/ML

## 2025-04-22 PROBLEM — I97.3 POSTOPERATIVE HYPERTENSION: Status: RESOLVED | Noted: 2021-05-28 | Resolved: 2025-04-22

## 2025-04-22 PROBLEM — R06.09 DYSPNEA ON EXERTION: Status: RESOLVED | Noted: 2018-10-04 | Resolved: 2025-04-22

## 2025-04-22 PROBLEM — J98.11 ATELECTASIS: Status: RESOLVED | Noted: 2021-05-26 | Resolved: 2025-04-22

## 2025-04-22 PROBLEM — Z75.8 DISCHARGE PLANNING ISSUES: Status: RESOLVED | Noted: 2021-11-03 | Resolved: 2025-04-22

## 2025-04-22 PROBLEM — I35.0 NONRHEUMATIC AORTIC VALVE STENOSIS: Status: ACTIVE | Noted: 2025-04-22

## 2025-04-22 PROBLEM — R78.81 BACTEREMIA: Status: RESOLVED | Noted: 2020-05-09 | Resolved: 2025-04-22

## 2025-04-22 PROBLEM — D62 ACUTE BLOOD LOSS ANEMIA: Status: RESOLVED | Noted: 2018-10-20 | Resolved: 2025-04-22

## 2025-04-22 PROBLEM — J96.01 ACUTE RESPIRATORY FAILURE WITH HYPOXIA (HCC): Status: RESOLVED | Noted: 2020-05-07 | Resolved: 2025-04-22

## 2025-04-22 PROBLEM — J12.9 VIRAL PNEUMONIA: Status: RESOLVED | Noted: 2020-05-07 | Resolved: 2025-04-22

## 2025-04-22 PROBLEM — I48.20 CHRONIC ATRIAL FIBRILLATION (HCC): Status: RESOLVED | Noted: 2021-05-25 | Resolved: 2025-04-22

## 2025-04-22 PROBLEM — K25.9 STOMACH ULCER: Status: RESOLVED | Noted: 2021-11-03 | Resolved: 2025-04-22

## 2025-04-24 ENCOUNTER — OFFICE VISIT (OUTPATIENT)
Dept: CARDIOLOGY CLINIC | Age: 70
End: 2025-04-24
Payer: MEDICARE

## 2025-04-24 VITALS
HEIGHT: 71 IN | SYSTOLIC BLOOD PRESSURE: 138 MMHG | DIASTOLIC BLOOD PRESSURE: 82 MMHG | BODY MASS INDEX: 44.1 KG/M2 | RESPIRATION RATE: 18 BRPM | WEIGHT: 315 LBS | HEART RATE: 57 BPM

## 2025-04-24 DIAGNOSIS — I35.0 NONRHEUMATIC AORTIC VALVE STENOSIS: Primary | ICD-10-CM

## 2025-04-24 PROCEDURE — 1159F MED LIST DOCD IN RCRD: CPT | Performed by: INTERNAL MEDICINE

## 2025-04-24 PROCEDURE — 93000 ELECTROCARDIOGRAM COMPLETE: CPT | Performed by: INTERNAL MEDICINE

## 2025-04-24 PROCEDURE — 1124F ACP DISCUSS-NO DSCNMKR DOCD: CPT | Performed by: INTERNAL MEDICINE

## 2025-04-24 PROCEDURE — G2211 COMPLEX E/M VISIT ADD ON: HCPCS | Performed by: INTERNAL MEDICINE

## 2025-04-24 PROCEDURE — 1160F RVW MEDS BY RX/DR IN RCRD: CPT | Performed by: INTERNAL MEDICINE

## 2025-04-24 PROCEDURE — 99203 OFFICE O/P NEW LOW 30 MIN: CPT | Performed by: INTERNAL MEDICINE

## 2025-04-24 RX ORDER — RAMIPRIL 5 MG/1
5 CAPSULE ORAL DAILY
COMMUNITY
Start: 2025-04-16

## 2025-04-24 NOTE — PROGRESS NOTES
Chief Complaint   Patient presents with    Valvular Heart Disease       Patient Active Problem List    Diagnosis Date Noted    Nonrheumatic aortic valve stenosis 04/22/2025     Overview Note:     - S/p TAVR 5/26/21 ([facility])  - TTE 7/16/21 ([facility]) - S/P transcatheter aortic valve replacement. Contreras S3 prosthetic aortic valve   (size #29)  MG = 18 mm, was 16 immediately post procedure  - TTE [facility] 4/22/2024:  MG = 22 mm,   normal LV      Persistent atrial fibrillation (HCC) 12/08/2021    On home O2 05/25/2021    CHAITANYA (obstructive sleep apnea)     Morbid obesity with BMI of 50.0-59.9, adult ([facility]) 05/07/2020    Diverticulosis 10/20/2018    Diabetes mellitus type 2, controlled ([facility]) 10/20/2018    Tobacco user 06/22/2016       Current Outpatient Medications   Medication Sig Dispense Refill    ramipril (ALTACE) 5 MG capsule Take 1 capsule by mouth daily      dofetilide (TIKOSYN) 500 MCG capsule TAKE ONE CAPSULE BY MOUTH EVERY 12 HOURS 60 capsule 0    atenolol (TENORMIN) 50 MG tablet Take 1 tablet by mouth daily 90 tablet 1    TOUJEO SOLOSTAR 300 UNIT/ML concentrated injection pen INJECT 30 UNITS UNDER THE SKIN EVERY NIGHT. REFIREGERATE UNTIL IN USE.      vitamin E 1000 units capsule Take 1 capsule by mouth daily      ELIQUIS 5 MG TABS tablet Take 1 tablet by mouth in the morning and at bedtime      metFORMIN (GLUCOPHAGE) 1000 MG tablet Take 1 tablet by mouth in the morning and at bedtime      magnesium oxide (MAG-OX) 400 MG tablet Take 1 tablet by mouth daily 30 tablet 1    ascorbic acid (VITAMIN C) 500 MG tablet Take 1 tablet by mouth 2 times daily      ferrous sulfate (FE TABS) 325 (65 Fe) MG EC tablet Take 1 tablet by mouth 2 times daily (Patient taking differently: Take 1 tablet by mouth daily (with breakfast)) 90 tablet 3    Omega-3 Fatty Acids (FISH OIL) 1000 MG CAPS Take 1 capsule by mouth every morning      allopurinol (ZYLOPRIM) 100 MG tablet Take 1 tablet by mouth every morning      vitamin D (CHOLECALCIFEROL) 1000

## 2025-05-02 DIAGNOSIS — I48.0 PAROXYSMAL ATRIAL FIBRILLATION (HCC): ICD-10-CM

## 2025-05-09 ENCOUNTER — RESULTS FOLLOW-UP (OUTPATIENT)
Dept: NON INVASIVE DIAGNOSTICS | Age: 70
End: 2025-05-09

## 2025-05-09 NOTE — TELEPHONE ENCOUNTER
Spoke to patient about monitor results. Patient agreed and understood. Patient has no questions or concerns.

## 2025-05-09 NOTE — TELEPHONE ENCOUNTER
----- Message from Carlene MENDOZA sent at 5/8/2025  3:55 PM EDT -----  Please let him know his 2-week monitor that he wore showed only a 2% burden of atrial fibrillation.  He is to continue the same medication as previous.  ----- Message -----  From: Loretta Fonseca MA  Sent: 5/2/2025  10:24 AM EDT  To: HELENA Del Rosario - CNP

## 2025-05-27 NOTE — TELEPHONE ENCOUNTER
Called pt to see if pt had lab work drawn and pt started using cuss words and threatening to kimmie the office because of protocol for antiarrythmic medications. I was trying to explain why there's protocol on these medications and he wouldn't let me. Patient was very disrespectful.

## 2025-05-28 DIAGNOSIS — I48.0 PAROXYSMAL ATRIAL FIBRILLATION (HCC): ICD-10-CM

## 2025-05-28 RX ORDER — DOFETILIDE 0.5 MG/1
CAPSULE ORAL
Qty: 180 CAPSULE | Refills: 1 | Status: SHIPPED | OUTPATIENT
Start: 2025-05-28

## 2025-07-03 RX ORDER — ATENOLOL 50 MG/1
50 TABLET ORAL DAILY
Qty: 90 TABLET | Refills: 3 | Status: SHIPPED | OUTPATIENT
Start: 2025-07-03

## (undated) DEVICE — BLOCK BITE 60FR RUBBER ADLT DENTAL

## (undated) DEVICE — GRADUATE TRIANG MEASURE 1000ML BLK PRNT

## (undated) DEVICE — FORCEPS BX OVL CUP FEN DISPOSABLE CAP L 160CM RAD JAW 4